# Patient Record
Sex: MALE | Race: WHITE | HISPANIC OR LATINO | Employment: OTHER | URBAN - NONMETROPOLITAN AREA
[De-identification: names, ages, dates, MRNs, and addresses within clinical notes are randomized per-mention and may not be internally consistent; named-entity substitution may affect disease eponyms.]

---

## 2017-04-07 ENCOUNTER — OFFICE VISIT (OUTPATIENT)
Dept: MEDICAL GROUP | Facility: PHYSICIAN GROUP | Age: 42
End: 2017-04-07
Payer: COMMERCIAL

## 2017-04-07 VITALS
HEIGHT: 72 IN | WEIGHT: 230 LBS | SYSTOLIC BLOOD PRESSURE: 136 MMHG | BODY MASS INDEX: 31.15 KG/M2 | DIASTOLIC BLOOD PRESSURE: 88 MMHG | HEART RATE: 60 BPM | RESPIRATION RATE: 16 BRPM | OXYGEN SATURATION: 97 % | TEMPERATURE: 97.9 F

## 2017-04-07 DIAGNOSIS — F51.01 PRIMARY INSOMNIA: ICD-10-CM

## 2017-04-07 DIAGNOSIS — K21.9 GASTROESOPHAGEAL REFLUX DISEASE, ESOPHAGITIS PRESENCE NOT SPECIFIED: ICD-10-CM

## 2017-04-07 DIAGNOSIS — E66.9 OBESITY (BMI 30-39.9): ICD-10-CM

## 2017-04-07 DIAGNOSIS — Z00.00 HEALTH CARE MAINTENANCE: ICD-10-CM

## 2017-04-07 DIAGNOSIS — E78.2 MIXED HYPERLIPIDEMIA: ICD-10-CM

## 2017-04-07 DIAGNOSIS — J31.0 OTHER RHINITIS: ICD-10-CM

## 2017-04-07 DIAGNOSIS — M25.512 CHRONIC LEFT SHOULDER PAIN: ICD-10-CM

## 2017-04-07 DIAGNOSIS — G89.29 CHRONIC LEFT SHOULDER PAIN: ICD-10-CM

## 2017-04-07 PROCEDURE — 99214 OFFICE O/P EST MOD 30 MIN: CPT | Performed by: NURSE PRACTITIONER

## 2017-04-07 RX ORDER — ESOMEPRAZOLE MAGNESIUM 40 MG/1
CAPSULE, DELAYED RELEASE ORAL
Refills: 2 | COMMUNITY
Start: 2017-02-26 | End: 2017-04-07 | Stop reason: SDUPTHER

## 2017-04-07 RX ORDER — AMITRIPTYLINE HYDROCHLORIDE 25 MG/1
25 TABLET, FILM COATED ORAL PRN
Qty: 90 TAB | Refills: 1 | Status: SHIPPED | OUTPATIENT
Start: 2017-04-07 | End: 2018-03-21 | Stop reason: SDUPTHER

## 2017-04-07 RX ORDER — ESOMEPRAZOLE MAGNESIUM 40 MG/1
CAPSULE, DELAYED RELEASE ORAL
Qty: 90 CAP | Refills: 3 | Status: SHIPPED | OUTPATIENT
Start: 2017-04-07 | End: 2018-08-23

## 2017-04-07 RX ORDER — FLUTICASONE PROPIONATE 50 MCG
1 SPRAY, SUSPENSION (ML) NASAL 2 TIMES DAILY
Qty: 1 BOTTLE | Refills: 11 | Status: SHIPPED | OUTPATIENT
Start: 2017-04-07 | End: 2018-08-23

## 2017-04-07 ASSESSMENT — PATIENT HEALTH QUESTIONNAIRE - PHQ9: CLINICAL INTERPRETATION OF PHQ2 SCORE: 0

## 2017-04-07 NOTE — PATIENT INSTRUCTIONS
Continue on esomeprazole--take on empty stomach    Work on diet and weight loss--aim for no more 75 grams of carbs per day    PT referral for chronic L shoulder pain    Medications refilled    Follow up with me in 6 months, with labs before visit      Food Choices for Gastroesophageal Reflux Disease, Adult  When you have gastroesophageal reflux disease (GERD), the foods you eat and your eating habits are very important. Choosing the right foods can help ease the discomfort of GERD.  WHAT GENERAL GUIDELINES DO I NEED TO FOLLOW?  · Choose fruits, vegetables, whole grains, low-fat dairy products, and low-fat meat, fish, and poultry.  · Limit fats such as oils, salad dressings, butter, nuts, and avocado.  · Keep a food diary to identify foods that cause symptoms.  · Avoid foods that cause reflux. These may be different for different people.  · Eat frequent small meals instead of three large meals each day.  · Eat your meals slowly, in a relaxed setting.  · Limit fried foods.  · Cook foods using methods other than frying.  · Avoid drinking alcohol.  · Avoid drinking large amounts of liquids with your meals.  · Avoid bending over or lying down until 2-3 hours after eating.  WHAT FOODS ARE NOT RECOMMENDED?  The following are some foods and drinks that may worsen your symptoms:  Vegetables  Tomatoes. Tomato juice. Tomato and spaghetti sauce. Chili peppers. Onion and garlic. Horseradish.  Fruits  Oranges, grapefruit, and lemon (fruit and juice).  Meats  High-fat meats, fish, and poultry. This includes hot dogs, ribs, ham, sausage, salami, and curiel.  Dairy  Whole milk and chocolate milk. Sour cream. Cream. Butter. Ice cream. Cream cheese.   Beverages  Coffee and tea, with or without caffeine. Carbonated beverages or energy drinks.  Condiments  Hot sauce. Barbecue sauce.   Sweets/Desserts  Chocolate and cocoa. Donuts. Peppermint and spearmint.  Fats and Oils  High-fat foods, including French fries and potato  chips.  Other  Vinegar. Strong spices, such as black pepper, white pepper, red pepper, cayenne, padgett powder, cloves, blanca, and chili powder.  The items listed above may not be a complete list of foods and beverages to avoid. Contact your dietitian for more information.     This information is not intended to replace advice given to you by your health care provider. Make sure you discuss any questions you have with your health care provider.     Document Released: 12/18/2006 Document Revised: 01/08/2016 Document Reviewed: 10/22/2014  [employer] Interactive Patient Education ©2016 [employer]

## 2017-04-07 NOTE — ASSESSMENT & PLAN NOTE
Patient has chronic left shoulder pain due to old injury. He has limited range of motion, also difficulty with positioning himself in bed, with pain to his left scapula. This is aggravated when he works out at the gym especially with overhead lat pulldown exercises. I discussed with him that perhaps some of these exercises may be aggravating the shoulder, will place referral to physical therapy for evaluation. If symptoms do not improve with physical therapy, over-the-counter NSAIDs, will get MRI and refer to orthopedics. Patient was agreeable to plan.

## 2017-04-07 NOTE — PROGRESS NOTES
Chief Complaint   Patient presents with   • Follow-Up     fv med         This is a 41 y.o.male patient that presents today with the following: Follow-up visit, acute and chronic conditions, medication refills    Chronic left shoulder pain  Patient has chronic left shoulder pain due to old injury. He has limited range of motion, also difficulty with positioning himself in bed, with pain to his left scapula. This is aggravated when he works out at the gym especially with overhead lat pulldown exercises. I discussed with him that perhaps some of these exercises may be aggravating the shoulder, will place referral to physical therapy for evaluation. If symptoms do not improve with physical therapy, over-the-counter NSAIDs, will get MRI and refer to orthopedics. Patient was agreeable to plan.    Gastroesophageal reflux disease  This is chronic in nature, stable and controlled with esomeprazole 40 mg daily. This was originally prescribed by his ENT provider. He is also trying to avoid aggravating factors. He was given printed educational material on foods and activities to avoid. He does need refills, this was called in for him. He does tolerate the medication well with no significant bothersome side effects. He was instructed to take this on an empty stomach, first thing in the morning. We will monitor this at least every 6 months and as needed.    Hyperlipidemia  This is chronic, stable and controlled with diet and exercise. His last lipid profile is as follows:  Component      Latest Ref Rng 10/19/2015           7:40 AM   Cholesterol,Tot      100 - 199 mg/dL 190   Triglycerides      0 - 149 mg/dL 87   HDL      >=40 mg/dL 53   LDL      <100 mg/dL 120 (H)   He is due for labs, these were ordered. He isn't have these done before he follows up with me in 6 months. In the interim, he is to continue with healthy diet, low-fat and low-cholesterol, increase physical activity and continued efforts towards weight  loss.    Obesity (BMI 30-39.9)  This is chronic, uncontrolled. His weight is 230 pounds, BMI 31.19. He understands the risks that obesity is imposing on his health. He tells me today he is going to start making significant changes to his diet, and he is going to increase his physical activity. He also understands that his obesity is affecting and likely aggravating his reflux. We discussed strategies on losing weight, decreasing his carb intake as well as calories. He is going to work on this for the next 6 months, we will reassess at that time.    Primary insomnia  This is a chronic condition, stable and controlled well on amitriptyline 25 mg at bedtime as needed. He tolerates this medication well with no significant or bothersome side effects. He does need refills, this was called in for him.  We did discuss sleep hygiene.  Discussed various topics of sleep hygiene: Encouraged to keep room dark and cool. Avoid caffeinated foods and/or beverages after noon. Avoid heavy meals and exercise 3-4 hours before bedtime. Avoid alcohol. Avoid electronic devices such as smart phones, tablets, TV, computers 3-4 hours prior to bedtime.   We will monitor this at least every 6 months and as needed.    Rhinitis  Chronic in nature, stable and well controlled on Flonase, one spray to each nostril twice a day. He does need refills, this was called in for him. He tolerates this well with no significant or bothersome side effects. We will monitor this at least annually and as needed.    Health care maintenance  Patient is due for routine fasting labs, these were ordered. He does not report a family history of colon cancer or polyps in a first-degree relative, thus he will not be due for colonoscopy until he is 50. He also does not report a family history of prostate cancer in a first-degree relative, thus he will not need a PSA screening. He is up-to-date with flu shot, he will need his next one in the fall.      No visits with  "results within 1 Month(s) from this visit.  Latest known visit with results is:    Office Visit on 11/22/2016   Component Date Value   • Rapid Strep Screen 11/22/2016 neg    • Internal Control Positive 11/22/2016 Positive    • Internal Control Negative 11/22/2016 Negative          clinical course has been stable    Past Medical History   Diagnosis Date   • A-fib (CMS-HCC)      4 years ago, one episode only, echo/stress test normal   • Geographic tongue    • Hypertension        History reviewed. No pertinent past surgical history.    Family History   Problem Relation Age of Onset   • Diabetes Mother        Lisinopril and Other drug    Current Outpatient Prescriptions Ordered in [facility]   Medication Sig Dispense Refill   • esomeprazole (NEXIUM) 40 MG delayed-release capsule TK 1 C C PO Q DAY 90 Cap 3   • amitriptyline (ELAVIL) 25 MG Tab Take 1 Tab by mouth as needed for Other. 90 Tab 1   • fluticasone (FLONASE) 50 MCG/ACT nasal spray Spray 1 Spray in nose 2 times a day. 1 Bottle 11     No current [facility]-ordered facility-administered medications on file.       Constitutional ROS: No unexpected change in weight, No weakness, No unexplained fevers, sweats, or chills  Pulmonary ROS: No chronic cough, sputum, or hemoptysis, No shortness of breath, No recent change in breathing, Positive for allergic rhinitis  Cardiovascular ROS: No chest pain, No edema, No palpitations  Gastrointestinal ROS: Positive for GERD, per history of present illness  Musculoskeletal/Extremities ROS: Positive for chronic left shoulder pain, per history of present illness  Neurologic ROS: Normal development, No seizures, No weakness  Psychiatric ROS: Positive for insomnia, per history of present illness    Physical exam:  /88 mmHg  Pulse 60  Temp(Src) 36.6 °C (97.9 °F)  Resp 16  Ht 1.829 m (6' 0.01\")  Wt 104.327 kg (230 lb)  BMI 31.19 kg/m2  SpO2 97%  General Appearance: Young male, alert, no distress, obese, well-groomed  Skin: Skin color, " texture, turgor normal. No rashes or lesions.  Lungs: negative findings: normal respiratory rate and rhythm, lungs clear to auscultation  Heart: negative. RRR without murmur, gallop, or rubs.  No ectopy.  Abdomen: Abdomen soft, non-tender. BS normal. No masses,  No organomegaly  Musculoskeletal: positive findings: Decreased range of motion of left shoulder due to pain  Neurologic: intact, oriented, mood appropriate, judgment intact. Cranial nerves II-12 grossly intact    Medical decision making/discussion: Patient here for follow-up visit and to discuss acute and chronic conditions. Medications were refilled, he is to take them as prescribed. He is due for labs, these were ordered. He is to have these done before he sees me in 6 months. I will place a referral to physical therapy to evaluate chronic left shoulder pain. In the interim he is to use over-the-counter NSAIDs as directed. If symptoms do not improve with the above interventions, will order MRI and place referral to orthopedics. He is to work on weight loss, increasing physical activity and making dietary changes as discussed. With regards to GERD, he was given printed educational material on foods and activity to avoid. He will be due for a flu shot in the fall.    Jensen was seen today for follow-up.    Diagnoses and all orders for this visit:    Gastroesophageal reflux disease, esophagitis presence not specified  -     esomeprazole (NEXIUM) 40 MG delayed-release capsule; TK 1 C C PO Q DAY    Chronic left shoulder pain  -     REFERRAL TO PHYSICAL THERAPY Reason for Therapy: Eval/Treat/Report    Primary insomnia  -     amitriptyline (ELAVIL) 25 MG Tab; Take 1 Tab by mouth as needed for Other.    Other rhinitis  -     fluticasone (FLONASE) 50 MCG/ACT nasal spray; Spray 1 Spray in nose 2 times a day.    Mixed hyperlipidemia  -     COMP METABOLIC PANEL; Future  -     LIPID PROFILE; Future    Obesity (BMI 30-39.9)  -     Patient identified as having weight  management issue.  Appropriate orders and counseling given.    Health care maintenance          Please note that this dictation was created using voice recognition software. I have made every reasonable attempt to correct obvious errors, but I expect that there are errors of grammar and possibly content that I did not discover before finalizing the note.

## 2017-04-07 NOTE — ASSESSMENT & PLAN NOTE
This is chronic, stable and controlled with diet and exercise. His last lipid profile is as follows:  Component      Latest Ref Rng 10/19/2015           7:40 AM   Cholesterol,Tot      100 - 199 mg/dL 190   Triglycerides      0 - 149 mg/dL 87   HDL      >=40 mg/dL 53   LDL      <100 mg/dL 120 (H)   He is due for labs, these were ordered. He isn't have these done before he follows up with me in 6 months. In the interim, he is to continue with healthy diet, low-fat and low-cholesterol, increase physical activity and continued efforts towards weight loss.

## 2017-04-07 NOTE — MR AVS SNAPSHOT
"Jensen Donald   2017 9:00 AM   Office Visit   MRN: [id number]    Department:  [facility]   Dept Phone:  [phone]    Description:  Male : 1975   Provider:  BRYAN Ma           Reason for Visit     Follow-Up fv med      Allergies as of 2017     Allergen Noted Reactions    Lisinopril 10/29/2015       cough    Other Drug 2013       Can't remember.  Few yrs ago. Anti-anxiety pill      You were diagnosed with     Chronic left shoulder pain   [118606]       Globus sensation   [591214]       Primary insomnia   [998663]       Other rhinitis   [5761218]       Eustachian tube dysfunction, bilateral   [5139272]       Gastroesophageal reflux disease, esophagitis presence not specified   [1838643]       Mixed hyperlipidemia   [272.2.ICD-9-CM]         Vital Signs     Blood Pressure Pulse Temperature Respirations Height Weight    136/88 mmHg 60 36.6 °C (97.9 °F) 16 1.829 m (6' 0.01\") 104.327 kg (230 lb)    Body Mass Index Oxygen Saturation Smoking Status             31.19 kg/m2 97% Former Smoker         Basic Information     Date Of Birth Sex Race Ethnicity Preferred Language    1975 Male  or   Origin (Stateless,Bur[place]ian,Malian,John, etc) English      Problem List              ICD-10-CM Priority Class Noted - Resolved    Ecchymosis R58   2013 - Present    Dizziness R42   2013 - Present    Nausea R11.0   2013 - Present    Purpura (CMS-HCC) D69.2   2013 - Present    Tonsil asymmetry J35.8   10/16/2014 - Present    Snoring R06.83   10/16/2014 - Present    Hyperlipidemia E78.5   10/16/2014 - Present    Cyst of joint of ankle or foot M25.879   10/16/2014 - Present    HTN (hypertension) I10   2014 - Present    abnormal electrocardiogram (ECG) (EKG) R94.31   2014 - Present    Pharyngitis due to Streptococcus species J02.0   10/21/2016 - Present    Globus sensation F45.8   10/21/2016 - Present    Primary " insomnia F51.01   10/21/2016 - Present    Recurrent pain of right knee M25.561   10/21/2016 - Present    Chronic left shoulder pain M25.512, G89.29   4/7/2017 - Present    Rhinitis J31.0   4/7/2017 - Present    Gastroesophageal reflux disease K21.9   4/7/2017 - Present      Health Maintenance        Date Due Completion Dates    IMM DTaP/Tdap/Td Vaccine (1 - Tdap) 7/26/1994 ---            Current Immunizations     Influenza TIV (IM) 1/10/2014  2:51 PM    Influenza Vaccine Quad Inj (Pf) 10/16/2014    Influenza Vaccine Quad Inj (Preserved) 10/21/2016      Below and/or attached are the medications your provider expects you to take. Review all of your home medications and newly ordered medications with your provider and/or pharmacist. Follow medication instructions as directed by your provider and/or pharmacist. Please keep your medication list with you and share with your provider. Update the information when medications are discontinued, doses are changed, or new medications (including over-the-counter products) are added; and carry medication information at all times in the event of emergency situations     Allergies:  LISINOPRIL - (reactions not documented)     OTHER DRUG - (reactions not documented)               Medications  Valid as of: April 07, 2017 -  9:27 AM    Generic Name Brand Name Tablet Size Instructions for use    Amitriptyline HCl (Tab) ELAVIL 25 MG Take 1 Tab by mouth as needed for Other.        Esomeprazole Magnesium (CAPSULE DELAYED RELEASE) NEXIUM 40 MG TK 1 C C PO Q DAY        Fluticasone Propionate (Suspension) FLONASE 50 MCG/ACT Spray 1 Spray in nose 2 times a day.        .                 Medicines prescribed today were sent to:     [id number] - [place], [place] - [address]    [address] [place] [address]    Phone: [phone] Fax: [phone]    Open 24 Hours?: No      Medication refill instructions:       If your  prescription bottle indicates you have medication refills left, it is not necessary to call your provider’s office. Please contact your pharmacy and they will refill your medication.    If your prescription bottle indicates you do not have any refills left, you may request refills at any time through one of the following ways: The online [email or web address] system (except Urgent Care), by calling your provider’s office, or by asking your pharmacy to contact your provider’s office with a refill request. Medication refills are processed only during regular business hours and may not be available until the next business day. Your provider may request additional information or to have a follow-up visit with you prior to refilling your medication.   *Please Note: Medication refills are assigned a new Rx number when refilled electronically. Your pharmacy may indicate that no refills were authorized even though a new prescription for the same medication is available at the pharmacy. Please request the medicine by name with the pharmacy before contacting your provider for a refill.        Your To Do List     Future Labs/Procedures Complete By Expires    COMP METABOLIC PANEL  As directed 4/7/2018    LIPID PROFILE  As directed 4/7/2018      Referral     A referral request has been sent to our patient care coordination department. Please allow 3-5 business days for us to process this request and contact you either by phone or mail. If you do not hear from us by the 5th business day, please call us at [phone].        Instructions    Continue on esomeprazole--take on empty stomach    Work on diet and weight loss--aim for no more 75 grams of carbs per day    PT referral for chronic L shoulder pain    Medications refilled    Follow up with me in 6 months, with labs before visit      Food Choices for Gastroesophageal Reflux Disease, Adult  When you have gastroesophageal reflux disease (GERD), the foods you eat and your eating habits are  very important. Choosing the right foods can help ease the discomfort of GERD.  WHAT GENERAL GUIDELINES DO I NEED TO FOLLOW?  · Choose fruits, vegetables, whole grains, low-fat dairy products, and low-fat meat, fish, and poultry.  · Limit fats such as oils, salad dressings, butter, nuts, and avocado.  · Keep a food diary to identify foods that cause symptoms.  · Avoid foods that cause reflux. These may be different for different people.  · Eat frequent small meals instead of three large meals each day.  · Eat your meals slowly, in a relaxed setting.  · Limit fried foods.  · Cook foods using methods other than frying.  · Avoid drinking alcohol.  · Avoid drinking large amounts of liquids with your meals.  · Avoid bending over or lying down until 2-3 hours after eating.  WHAT FOODS ARE NOT RECOMMENDED?  The following are some foods and drinks that may worsen your symptoms:  Vegetables  Tomatoes. Tomato juice. Tomato and spaghetti sauce. Chili peppers. Onion and garlic. Horseradish.  Fruits  Oranges, grapefruit, and lemon (fruit and juice).  Meats  High-fat meats, fish, and poultry. This includes hot dogs, ribs, ham, sausage, salami, and curiel.  Dairy  Whole milk and chocolate milk. Sour cream. Cream. Butter. Ice cream. Cream cheese.   Beverages  Coffee and tea, with or without caffeine. Carbonated beverages or energy drinks.  Condiments  Hot sauce. Barbecue sauce.   Sweets/Desserts  Chocolate and cocoa. Donuts. Peppermint and spearmint.  Fats and Oils  High-fat foods, including French fries and potato chips.  Other  Vinegar. Strong spices, such as black pepper, white pepper, red pepper, cayenne, padgett powder, cloves, ginger, and chili powder.  The items listed above may not be a complete list of foods and beverages to avoid. Contact your dietitian for more information.     This information is not intended to replace advice given to you by your health care provider. Make sure you discuss any questions you have with your  health care provider.     Document Released: 12/18/2006 Document Revised: 01/08/2016 Document Reviewed: 10/22/2014  [employer] Interactive Patient Education ©2016 [employer]            [employer] Access Code: Activation code not generated  Current [place] [facility] Status: Active

## 2017-04-07 NOTE — ASSESSMENT & PLAN NOTE
This is a chronic condition, stable and controlled well on amitriptyline 25 mg at bedtime as needed. He tolerates this medication well with no significant or bothersome side effects. He does need refills, this was called in for him.  We did discuss sleep hygiene.  Discussed various topics of sleep hygiene: Encouraged to keep room dark and cool. Avoid caffeinated foods and/or beverages after noon. Avoid heavy meals and exercise 3-4 hours before bedtime. Avoid alcohol. Avoid electronic devices such as smart phones, tablets, TV, computers 3-4 hours prior to bedtime.   We will monitor this at least every 6 months and as needed.

## 2017-04-07 NOTE — ASSESSMENT & PLAN NOTE
This is chronic, uncontrolled. His weight is 230 pounds, BMI 31.19. He understands the risks that obesity is imposing on his health. He tells me today he is going to start making significant changes to his diet, and he is going to increase his physical activity. He also understands that his obesity is affecting and likely aggravating his reflux. We discussed strategies on losing weight, decreasing his carb intake as well as calories. He is going to work on this for the next 6 months, we will reassess at that time.

## 2017-04-07 NOTE — ASSESSMENT & PLAN NOTE
Chronic in nature, stable and well controlled on Flonase, one spray to each nostril twice a day. He does need refills, this was called in for him. He tolerates this well with no significant or bothersome side effects. We will monitor this at least annually and as needed.

## 2017-04-07 NOTE — ASSESSMENT & PLAN NOTE
Patient is due for routine fasting labs, these were ordered. He does not report a family history of colon cancer or polyps in a first-degree relative, thus he will not be due for colonoscopy until he is 50. He also does not report a family history of prostate cancer in a first-degree relative, thus he will not need a PSA screening. He is up-to-date with flu shot, he will need his next one in the fall.

## 2017-04-07 NOTE — ASSESSMENT & PLAN NOTE
This is chronic in nature, stable and controlled with esomeprazole 40 mg daily. This was originally prescribed by his ENT provider. He is also trying to avoid aggravating factors. He was given printed educational material on foods and activities to avoid. He does need refills, this was called in for him. He does tolerate the medication well with no significant bothersome side effects. He was instructed to take this on an empty stomach, first thing in the morning. We will monitor this at least every 6 months and as needed.

## 2017-11-07 ENCOUNTER — HOSPITAL ENCOUNTER (OUTPATIENT)
Dept: LAB | Facility: MEDICAL CENTER | Age: 42
End: 2017-11-07
Attending: NURSE PRACTITIONER
Payer: COMMERCIAL

## 2017-11-07 DIAGNOSIS — E78.2 MIXED HYPERLIPIDEMIA: ICD-10-CM

## 2017-11-07 LAB
ALBUMIN SERPL BCP-MCNC: 4.1 G/DL (ref 3.2–4.9)
ALBUMIN/GLOB SERPL: 1.5 G/DL
ALP SERPL-CCNC: 74 U/L (ref 30–99)
ALT SERPL-CCNC: 40 U/L (ref 2–50)
ANION GAP SERPL CALC-SCNC: 7 MMOL/L (ref 0–11.9)
AST SERPL-CCNC: 21 U/L (ref 12–45)
BILIRUB SERPL-MCNC: 0.5 MG/DL (ref 0.1–1.5)
BUN SERPL-MCNC: 25 MG/DL (ref 8–22)
CALCIUM SERPL-MCNC: 9.2 MG/DL (ref 8.5–10.5)
CHLORIDE SERPL-SCNC: 104 MMOL/L (ref 96–112)
CHOLEST SERPL-MCNC: 191 MG/DL (ref 100–199)
CO2 SERPL-SCNC: 25 MMOL/L (ref 20–33)
CREAT SERPL-MCNC: 0.89 MG/DL (ref 0.5–1.4)
GFR SERPL CREATININE-BSD FRML MDRD: >60 ML/MIN/1.73 M 2
GLOBULIN SER CALC-MCNC: 2.7 G/DL (ref 1.9–3.5)
GLUCOSE SERPL-MCNC: 85 MG/DL (ref 65–99)
HDLC SERPL-MCNC: 48 MG/DL
LDLC SERPL CALC-MCNC: 122 MG/DL
POTASSIUM SERPL-SCNC: 4 MMOL/L (ref 3.6–5.5)
PROT SERPL-MCNC: 6.8 G/DL (ref 6–8.2)
SODIUM SERPL-SCNC: 136 MMOL/L (ref 135–145)
TRIGL SERPL-MCNC: 106 MG/DL (ref 0–149)

## 2017-11-07 PROCEDURE — 80061 LIPID PANEL: CPT

## 2017-11-07 PROCEDURE — 36415 COLL VENOUS BLD VENIPUNCTURE: CPT

## 2017-11-07 PROCEDURE — 80053 COMPREHEN METABOLIC PANEL: CPT

## 2017-11-08 ENCOUNTER — APPOINTMENT (OUTPATIENT)
Dept: RADIOLOGY | Facility: IMAGING CENTER | Age: 42
End: 2017-11-08
Attending: NURSE PRACTITIONER
Payer: COMMERCIAL

## 2017-11-08 ENCOUNTER — TELEPHONE (OUTPATIENT)
Dept: MEDICAL GROUP | Facility: PHYSICIAN GROUP | Age: 42
End: 2017-11-08

## 2017-11-08 ENCOUNTER — OFFICE VISIT (OUTPATIENT)
Dept: MEDICAL GROUP | Facility: PHYSICIAN GROUP | Age: 42
End: 2017-11-08
Payer: COMMERCIAL

## 2017-11-08 VITALS
SYSTOLIC BLOOD PRESSURE: 126 MMHG | HEART RATE: 88 BPM | RESPIRATION RATE: 16 BRPM | OXYGEN SATURATION: 97 % | TEMPERATURE: 98.1 F | DIASTOLIC BLOOD PRESSURE: 98 MMHG | HEIGHT: 72 IN | BODY MASS INDEX: 33.05 KG/M2 | WEIGHT: 244 LBS

## 2017-11-08 DIAGNOSIS — M25.532 LEFT WRIST PAIN: ICD-10-CM

## 2017-11-08 DIAGNOSIS — J30.9 CHRONIC ALLERGIC RHINITIS, UNSPECIFIED SEASONALITY, UNSPECIFIED TRIGGER: ICD-10-CM

## 2017-11-08 DIAGNOSIS — M25.512 CHRONIC LEFT SHOULDER PAIN: ICD-10-CM

## 2017-11-08 DIAGNOSIS — G89.29 CHRONIC LEFT SHOULDER PAIN: ICD-10-CM

## 2017-11-08 DIAGNOSIS — E66.9 OBESITY (BMI 30-39.9): ICD-10-CM

## 2017-11-08 DIAGNOSIS — E78.5 MILD HYPERLIPIDEMIA: ICD-10-CM

## 2017-11-08 PROCEDURE — 99214 OFFICE O/P EST MOD 30 MIN: CPT | Performed by: NURSE PRACTITIONER

## 2017-11-08 PROCEDURE — 73110 X-RAY EXAM OF WRIST: CPT | Mod: TC,LT | Performed by: NURSE PRACTITIONER

## 2017-11-08 NOTE — ASSESSMENT & PLAN NOTE
This is a chronic condition, uncontrolled. His weight is 244 pounds, BMI is 33.09. He has gained nearly 15 pounds since his last visit in April 2017. He states he has been increasingly busy with his job and working has been doing around the house. He has resumed going to the gym and starting to work on decreasing calories as well as carbohydrates.

## 2017-11-08 NOTE — ASSESSMENT & PLAN NOTE
This is a chronic condition, stable. He has chronic pain in the left shoulder due to an old injury. He does continue to have limited range of motion. At his last visit with me in early April 2017 he was referred to physical therapy, but he admits to never going to therapy. I did notify him that referral is still good and it will be good to April 2018. He states that he will give them a call and set up an appointment.

## 2017-11-08 NOTE — ASSESSMENT & PLAN NOTE
This is chronic, for years now. He thinks he may have fractured it several years ago. The other day he re-injured it by lifting a heavy weight in a neutral position. He did hear a pop. He does have pain that radiates up into forearm and shoulder. He reports that at times the pain is likened to numbness and tingling. He does have negative Phalen's and Tinel's test in office today. He does have tenderness over the thenar prominence. He has been using a old wrist splint with additional thumb support but it does not keep his wrist from flexion. We will get x-ray today, results are as follows:    1.  Small curvilinear ossific density adjacent to the radial aspect of the left ulnar styloid which represent small avulsion fracture fragment.    2.  Otherwise negative left wrist series.    He was advised to wear a cockup wrist splint, was encouraged to continue with over-the-counter ibuprofen or Aleve. If symptoms do not improve with splinting and analgesics, patient may need to repeat imaging and possibly referral to hand surgeon.

## 2017-11-08 NOTE — PROGRESS NOTES
Chief Complaint   Patient presents with   • Hypertension     fv labs   • Wrist Pain     L wrist pain         This is a 42 y.o.male patient that presents today with the following:Follow-up visit, review labs, discuss acute and chronic conditions    Left wrist pain  This is chronic, for years now. He thinks he may have fractured it several years ago. The other day he re-injured it by lifting a heavy weight in a neutral position. He did hear a pop. He does have pain that radiates up into forearm and shoulder. He reports that at times the pain is likened to numbness and tingling. He does have negative Phalen's and Tinel's test in office today. He does have tenderness over the thenar prominence. He has been using a old wrist splint with additional thumb support but it does not keep his wrist from flexion. We will get x-ray today, results are as follows:    1.  Small curvilinear ossific density adjacent to the radial aspect of the left ulnar styloid which represent small avulsion fracture fragment.    2.  Otherwise negative left wrist series.    He was advised to wear a cockup wrist splint, was encouraged to continue with over-the-counter ibuprofen or Aleve. If symptoms do not improve with splinting and analgesics, patient may need to repeat imaging and possibly referral to hand surgeon.    Rhinitis  This is a chronic condition, stable and well controlled with daily Flonase. He tolerates medication well no significant bothersome side effects. He does not need refills at this time.    Obesity (BMI 30-39.9)  This is a chronic condition, uncontrolled. His weight is 244 pounds, BMI is 33.09. He has gained nearly 15 pounds since his last visit in April 2017. He states he has been increasingly busy with his job and working has been doing around the house. He has resumed going to the gym and starting to work on decreasing calories as well as carbohydrates.    Mild hyperlipidemia  This is a chronic condition, stable and well  controlled with diet and exercise. His lipid profile is as follows:  Component      Latest Ref Rng & Units 11/7/2017           9:03 AM   Cholesterol,Tot      100 - 199 mg/dL 191   Triglycerides      0 - 149 mg/dL 106   HDL      >=40 mg/dL 48   LDL      <100 mg/dL 122 (H)   He does not take statins or any other cholesterol lowering medications. He was encouraged to continue with healthy low-fat, low-cholesterol diet, regular physical activity and continued efforts towards weight loss. We will recheck labs in one year.    Chronic left shoulder pain  This is a chronic condition, stable. He has chronic pain in the left shoulder due to an old injury. He does continue to have limited range of motion. At his last visit with me in early April 2017 he was referred to physical therapy, but he admits to never going to therapy. I did notify him that referral is still good and it will be good to April 2018. He states that he will give them a call and set up an appointment.      Hospital Outpatient Visit on 11/07/2017   Component Date Value   • Sodium 11/07/2017 136    • Potassium 11/07/2017 4.0    • Chloride 11/07/2017 104    • Co2 11/07/2017 25    • Anion Gap 11/07/2017 7.0    • Glucose 11/07/2017 85    • Bun 11/07/2017 25*   • Creatinine 11/07/2017 0.89    • Calcium 11/07/2017 9.2    • AST(SGOT) 11/07/2017 21    • ALT(SGPT) 11/07/2017 40    • Alkaline Phosphatase 11/07/2017 74    • Total Bilirubin 11/07/2017 0.5    • Albumin 11/07/2017 4.1    • Total Protein 11/07/2017 6.8    • Globulin 11/07/2017 2.7    • A-G Ratio 11/07/2017 1.5    • Cholesterol,Tot 11/07/2017 191    • Triglycerides 11/07/2017 106    • HDL 11/07/2017 48    • LDL 11/07/2017 122*   • GFR If  11/07/2017 >60    • GFR If Non  Ameri* 11/07/2017 >60          clinical course has been stable    Past Medical History:   Diagnosis Date   • A-fib (CMS-HCC)     4 years ago, one episode only, echo/stress test normal   • Geographic tongue    •  Hypertension        No past surgical history on file.    Family History   Problem Relation Age of Onset   • Diabetes Mother        Diltiazem hcl; Lisinopril; and Other drug    Current Outpatient Prescriptions Ordered in [facility]   Medication Sig Dispense Refill   • esomeprazole (NEXIUM) 40 MG delayed-release capsule TK 1 C C PO Q DAY 90 Cap 3   • amitriptyline (ELAVIL) 25 MG Tab Take 1 Tab by mouth as needed for Other. 90 Tab 1   • fluticasone (FLONASE) 50 MCG/ACT nasal spray Spray 1 Spray in nose 2 times a day. 1 Bottle 11     No current [facility]-ordered facility-administered medications on file.        Constitutional ROS: No unexpected change in weight, No weakness, No unexplained fevers, sweats, or chills  Pulmonary ROS: No chronic cough, sputum, or hemoptysis, No shortness of breath, No recent change in breathing  Cardiovascular ROS: No chest pain, No edema, No palpitations  Gastrointestinal ROS: No abdominal pain, No nausea, vomiting, diarrhea, or constipation, no blood in stool  Musculoskeletal/Extremities ROS: Positive per history of present illness  Neurologic ROS: Normal development, No seizures, No weakness    Physical exam:  /98   Pulse 88   Temp 36.7 °C (98.1 °F)   Resp 16   Ht 1.829 m (6')   Wt 110.7 kg (244 lb)   SpO2 97%   BMI 33.09 kg/m²   General Appearance: Young male, alert, no distress, obese, well-groomed  Skin: Skin color, texture, turgor normal. No rashes or lesions.  Lungs: negative findings: normal respiratory rate and rhythm, normal effort  Heart: negative. RRR without murmur, gallop, or rubs.  No ectopy.  Abdomen: Abdomen soft, non-tender. BS normal. No masses,  No organomegaly  Musculoskeletal: positive findings: Tenderness with palpation to left thenar prominence and limited range of motion to left wrist.  Neurologic: intact, oriented, mood appropriate, judgment intact. Cranial nerves II-12 grossly intact    Medical decision making/discussion: Patient to continue wearing cock up  wrist splint on left wrist and take over-the-counter analgesics. Discussed possibility of referral for physical therapy as well as hand surgeon if symptoms do not continue to improve over the next couple weeks. He is continuing all his other medications as prescribed and call for refills as needed. He is going to work on his weight, increasing physical activity and decreasing calories as well as carbohydrates. He will be due for labs again in one year. He declines flu shot despite discussed benefits versus risks of not having done.    Jensen was seen today for hypertension and wrist pain.    Diagnoses and all orders for this visit:    Left wrist pain  -     DX-WRIST-COMPLETE 3+ LEFT; Future    Chronic left shoulder pain    Chronic allergic rhinitis, unspecified seasonality, unspecified trigger    Mild hyperlipidemia    Obesity (BMI 30-39.9)  -     Patient identified as having weight management issue.  Appropriate orders and counseling given.          Please note that this dictation was created using voice recognition software. I have made every reasonable attempt to correct obvious errors, but I expect that there are errors of grammar and possibly content that I did not discover before finalizing the note.

## 2017-11-08 NOTE — ASSESSMENT & PLAN NOTE
This is a chronic condition, stable and well controlled with daily Flonase. He tolerates medication well no significant bothersome side effects. He does not need refills at this time.

## 2017-11-15 ENCOUNTER — TELEPHONE (OUTPATIENT)
Dept: MEDICAL GROUP | Facility: PHYSICIAN GROUP | Age: 42
End: 2017-11-15

## 2017-11-15 NOTE — TELEPHONE ENCOUNTER
----- Message from Jensen Donald sent at 11/15/2017  5:00 AM PST -----  Regarding: x-ray results  Jensen  I have reviewed the results of your x-ray, it does appear that there may have been a fracture in the past, however there is no evidence of any acute (new) fracture or dislocation. If your pain does not continue to improve or if it worsens in the next week or 2, we may need to re-image your wrist and refer you to hand surgeon.  Grace

## 2017-11-15 NOTE — TELEPHONE ENCOUNTER
I sent the below info to patient via a [employer] message a week ago--pt still has not read message. Please call pt with message. Thank you.

## 2018-03-21 ENCOUNTER — OFFICE VISIT (OUTPATIENT)
Dept: URGENT CARE | Facility: PHYSICIAN GROUP | Age: 43
End: 2018-03-21
Payer: COMMERCIAL

## 2018-03-21 VITALS
HEART RATE: 88 BPM | TEMPERATURE: 100.7 F | SYSTOLIC BLOOD PRESSURE: 144 MMHG | HEIGHT: 72 IN | RESPIRATION RATE: 20 BRPM | BODY MASS INDEX: 33.86 KG/M2 | DIASTOLIC BLOOD PRESSURE: 86 MMHG | WEIGHT: 250 LBS | OXYGEN SATURATION: 97 %

## 2018-03-21 DIAGNOSIS — F51.01 PRIMARY INSOMNIA: ICD-10-CM

## 2018-03-21 DIAGNOSIS — J06.9 URI WITH COUGH AND CONGESTION: ICD-10-CM

## 2018-03-21 PROCEDURE — 99214 OFFICE O/P EST MOD 30 MIN: CPT | Performed by: PHYSICIAN ASSISTANT

## 2018-03-21 RX ORDER — DOXYCYCLINE HYCLATE 100 MG
100 TABLET ORAL 2 TIMES DAILY
Qty: 20 TAB | Refills: 0 | Status: SHIPPED | OUTPATIENT
Start: 2018-03-21 | End: 2018-06-17

## 2018-03-21 RX ORDER — AMITRIPTYLINE HYDROCHLORIDE 25 MG/1
25 TABLET, FILM COATED ORAL PRN
Qty: 90 TAB | Refills: 0 | Status: SHIPPED | OUTPATIENT
Start: 2018-03-21 | End: 2019-02-21

## 2018-03-21 NOTE — LETTER
March 21, 2018         Patient: Jensen Donald   YOB: 1975   Date of Visit: 3/21/2018           To Whom it May Concern:    Jensen Donald was seen in my clinic on 3/21/2018. He may return to work on 3/23/18.    If you have any questions or concerns, please don't hesitate to call.        Sincerely,           Karla Butt P.A.-C.  Electronically Signed

## 2018-03-21 NOTE — PROGRESS NOTES
Chief Complaint   Patient presents with   • Earache     Chest congestion; x4 days       HISTORY OF PRESENT ILLNESS: Patient is a 42 y.o. male who presents today for the following:    Sinus congestion x 7 days  + right ear pain, cough, SOB, body aches, fever (just started last night)  OTC meds tried: none   Patient also states he is out of his amitriptyline that he uses for insomnia as needed    Patient Active Problem List    Diagnosis Date Noted   • Left wrist pain 11/08/2017   • Chronic left shoulder pain 04/07/2017   • Rhinitis 04/07/2017   • Gastroesophageal reflux disease 04/07/2017   • Obesity (BMI 30-39.9) 04/07/2017   • Health care maintenance 04/07/2017   • Pharyngitis due to Streptococcus species 10/21/2016   • Globus sensation 10/21/2016   • Primary insomnia 10/21/2016   • Recurrent pain of right knee 10/21/2016   • HTN (hypertension) 12/19/2014   • abnormal electrocardiogram (ECG) (EKG) 12/19/2014   • Tonsil asymmetry 10/16/2014   • Snoring 10/16/2014   • Mild hyperlipidemia 10/16/2014   • Cyst of joint of ankle or foot 10/16/2014   • Ecchymosis 11/13/2013   • Dizziness 11/13/2013   • Nausea 11/13/2013   • Purpura (CMS-HCC) 11/13/2013       Allergies:Diltiazem hcl; Lisinopril; and Other drug    Current Outpatient Prescriptions Ordered in [facility]   Medication Sig Dispense Refill   • doxycycline (VIBRAMYCIN) 100 MG Tab Take 1 Tab by mouth 2 times a day. 20 Tab 0   • amitriptyline (ELAVIL) 25 MG Tab Take 1 Tab by mouth as needed for Other. 90 Tab 0   • esomeprazole (NEXIUM) 40 MG delayed-release capsule TK 1 C C PO Q DAY 90 Cap 3   • fluticasone (FLONASE) 50 MCG/ACT nasal spray Spray 1 Spray in nose 2 times a day. 1 Bottle 11     No current [facility]-ordered facility-administered medications on file.        Past Medical History:   Diagnosis Date   • A-fib (CMS-HCC)     4 years ago, one episode only, echo/stress test normal   • Geographic tongue    • Hypertension        Social History   Substance Use Topics   •  Smoking status: Former Smoker     Packs/day: 0.25     Years: 1.00     Types: Cigarettes   • Smokeless tobacco: Never Used   • Alcohol use 1.8 oz/week     1 Glasses of wine, 1 Cans of beer, 1 Shots of liquor per week      Comment: rarely       Family Status   Relation Status   • Mother      Family History   Problem Relation Age of Onset   • Diabetes Mother        ROS:    Review of Systems   Constitutional: Negative for fever, chills, weight loss and malaise/fatigue.   HENT: Negative for ear pain, nosebleeds,  sore throat and neck pain.    Eyes: Negative for blurred vision.   Respiratory: Negative for sputum production  and wheezing.    Cardiovascular: Negative for chest pain, palpitations, orthopnea and leg swelling.   Gastrointestinal: Negative for heartburn, nausea, vomiting and abdominal pain.   Genitourinary: Negative for dysuria, urgency and frequency.       Exam:  Blood pressure 144/86, pulse 88, temperature (!) 38.2 °C (100.7 °F), resp. rate 20, height 1.829 m (6'), weight 113.4 kg (250 lb), SpO2 97 %.  General: Well developed, well nourished. No distress.  HEENT: Conjunctiva clear, lids without ptosis, PERRL/EOMI. Ears normal shape and contour, canals are clear bilaterally, tympanic membranes are benign. Nasal mucosa benign. Oropharynx is without erythema, edema or exudates. MMM.  Pulmonary: Clear to ausculation and percussion.  Normal effort. No rales, ronchi, or wheezing.   Cardiovascular: Regular rate and rhythm without murmur. No edema.   Neurologic: Grossly nonfocal.  Lymph: No cervical lymphadenopathy noted.  Skin: Warm, dry, good turgor. No rashes in visible areas.   Psych: Normal mood. Alert and oriented x3. Judgment and insight is normal.    Assessment/Plan:  Take all medication as directed. Discussed appropriate over-the-counter symptomatic medication, and when to return to clinic. Follow-up for worsening or persistent symptoms.   1. URI with cough and congestion  doxycycline (VIBRAMYCIN)  100 MG Tab   2. Primary insomnia  amitriptyline (ELAVIL) 25 MG Tab

## 2018-03-26 DIAGNOSIS — R06.02 SOB (SHORTNESS OF BREATH): ICD-10-CM

## 2018-03-26 RX ORDER — METHYLPREDNISOLONE 4 MG/1
TABLET ORAL
Qty: 21 TAB | Refills: 0 | Status: SHIPPED | OUTPATIENT
Start: 2018-03-26 | End: 2018-06-17

## 2018-06-17 ENCOUNTER — OFFICE VISIT (OUTPATIENT)
Dept: URGENT CARE | Facility: PHYSICIAN GROUP | Age: 43
End: 2018-06-17
Payer: COMMERCIAL

## 2018-06-17 VITALS
WEIGHT: 255 LBS | DIASTOLIC BLOOD PRESSURE: 94 MMHG | RESPIRATION RATE: 12 BRPM | BODY MASS INDEX: 34.54 KG/M2 | HEIGHT: 72 IN | OXYGEN SATURATION: 96 % | SYSTOLIC BLOOD PRESSURE: 150 MMHG | TEMPERATURE: 98.6 F | HEART RATE: 64 BPM

## 2018-06-17 DIAGNOSIS — J22 ACUTE RESPIRATORY INFECTION: ICD-10-CM

## 2018-06-17 DIAGNOSIS — J45.20 MILD INTERMITTENT ASTHMA, UNSPECIFIED WHETHER COMPLICATED: ICD-10-CM

## 2018-06-17 PROCEDURE — 99214 OFFICE O/P EST MOD 30 MIN: CPT | Performed by: FAMILY MEDICINE

## 2018-06-17 RX ORDER — ALBUTEROL SULFATE 90 UG/1
AEROSOL, METERED RESPIRATORY (INHALATION)
Qty: 1 INHALER | Refills: 2 | Status: SHIPPED | OUTPATIENT
Start: 2018-06-17 | End: 2018-08-23

## 2018-06-17 RX ORDER — AZITHROMYCIN 250 MG/1
TABLET, FILM COATED ORAL
Qty: 6 TAB | Refills: 0 | Status: SHIPPED | OUTPATIENT
Start: 2018-06-17 | End: 2018-08-23

## 2018-06-17 NOTE — PROGRESS NOTES
Chief Complaint:    Chief Complaint   Patient presents with   • Pharyngitis     Recuring sore throat and Cold Sx       History of Present Illness:    This is a new problem. 2-3 weeks ago, he had sore throat which resolved. However in past 1 week, he has had subjective fever, nasal symptoms with purulent mucus from nose, sore throat, and cough productive of purulent mucus. Not getting better. Had asthma as child and feels like asthma is flaring. Used other person's MDI with some help. Did not like Doxycycline or Medrol Dose Tad. Z-tad works/tolerates.      Review of Systems:    Constitutional: See HPI.  Eyes: Negative for change in vision, photophobia, pain, redness, and discharge.  ENT: See HPI.    Respiratory: See HPI.  Cardiovascular: Negative for chest pain, palpitations, orthopnea, claudication, leg swelling, and PND.   Gastrointestinal: Negative for abdominal pain, nausea, vomiting, diarrhea, constipation, blood in stool, and melena.   Genitourinary: Negative for dysuria, urinary urgency, urinary frequency, hematuria, and flank pain.   Musculoskeletal: Negative for myalgias, joint pain, neck pain, and back pain.   Skin: Negative for rash and itching.   Neurological: Negative for dizziness, tingling, tremors, sensory change, speech change, focal weakness, seizures, loss of consciousness, and headaches.   Endo: Negative for polydipsia.   Heme: Does not bruise/bleed easily.   Psychiatric/Behavioral: Negative for depression, suicidal ideas, hallucinations, memory loss and substance abuse.         Past Medical History:    Past Medical History:   Diagnosis Date   • A-fib (HCC)     4 years ago, one episode only, echo/stress test normal   • Geographic tongue    • Hypertension      Past Surgical History:    History reviewed. No pertinent surgical history.    Social History:    Social History     Social History   • Marital status:      Spouse name: N/A   • Number of children: N/A   • Years of education: N/A      Occupational History   • Not on file.     Social History Main Topics   • Smoking status: Former Smoker     Packs/day: 0.25     Years: 1.00     Types: Cigarettes   • Smokeless tobacco: Never Used   • Alcohol use 1.8 oz/week     1 Glasses of wine, 1 Cans of beer, 1 Shots of liquor per week      Comment: rarely   • Drug use: No   • Sexual activity: Yes     Partners: Female      Comment: wife, Fern     Other Topics Concern   • Not on file     Social History Narrative   • No narrative on file     Family History:    Family History   Problem Relation Age of Onset   • Diabetes Mother      Medications:    Current Outpatient Prescriptions on File Prior to Visit   Medication Sig Dispense Refill   • amitriptyline (ELAVIL) 25 MG Tab Take 1 Tab by mouth as needed for Other. 90 Tab 0   • esomeprazole (NEXIUM) 40 MG delayed-release capsule TK 1 C C PO Q DAY 90 Cap 3   • fluticasone (FLONASE) 50 MCG/ACT nasal spray Spray 1 Spray in nose 2 times a day. 1 Bottle 11     No current facility-administered medications on file prior to visit.      Allergies:    Allergies   Allergen Reactions   • Diltiazem Hcl      Other reaction(s): Headache   • Lisinopril      cough   • Other Drug      Can't remember.  Few yrs ago. Anti-anxiety pill       Vitals:    Vitals:    06/17/18 1235   BP: 150/94   Pulse: 64   Resp: 12   Temp: 37 °C (98.6 °F)   SpO2: 96%   Weight: 115.7 kg (255 lb)   Height: 1.829 m (6')       Physical Exam:    Constitutional: Vital signs reviewed. Appears well-developed and well-nourished. Occl cough. No acute distress.   Eyes: Sclera white, conjunctivae clear.   ENT: External ears normal. External auditory canals normal without discharge. TMs translucent and non-bulging. Hearing normal. Nasal mucosa erythematous. Lips/teeth are normal. Oral mucosa pink and moist. Posterior pharynx: WNL.  Neck: Neck supple.   Cardiovascular: Regular rate and rhythm. No murmur.  Pulmonary/Chest: Respirations non-labored. Clear to auscultation  bilaterally.  Lymph: Cervical nodes without tenderness or enlargement.  Musculoskeletal: Normal gait. Normal range of motion. No muscular atrophy or weakness.  Neurological: Alert and oriented to person, place, and time. Muscle tone normal. Coordination normal.   Skin: No rashes or lesions. Warm, dry, normal turgor.  Psychiatric: Normal mood and affect. Behavior is normal. Judgment and thought content normal.       Assessment / Plan:    1. Acute respiratory infection  - azithromycin (ZITHROMAX) 250 MG Tab; 2 TABS ON DAY 1, 1 TAB ON DAYS 2-5.  Dispense: 6 Tab; Refill: 0  - albuterol 108 (90 Base) MCG/ACT Aero Soln inhalation aerosol; 2 PUFFS EVERY 4 HOURS ONLY IF NEEDED FOR COUGH, WHEEZING, OR SHORTNESS OF BREATH.  Dispense: 1 Inhaler; Refill: 2    2. Mild intermittent asthma, unspecified whether complicated  - albuterol 108 (90 Base) MCG/ACT Aero Soln inhalation aerosol; 2 PUFFS EVERY 4 HOURS ONLY IF NEEDED FOR COUGH, WHEEZING, OR SHORTNESS OF BREATH.  Dispense: 1 Inhaler; Refill: 2      Discussed with him DDX and management options.    May use OTC meds for symptoms prn.    Agreeable to medications prescribed.    Follow-up with PCP or urgent care if getting worse or not better with above.

## 2018-07-29 ENCOUNTER — APPOINTMENT (OUTPATIENT)
Dept: RADIOLOGY | Facility: IMAGING CENTER | Age: 43
End: 2018-07-29
Attending: NURSE PRACTITIONER
Payer: COMMERCIAL

## 2018-07-29 ENCOUNTER — OFFICE VISIT (OUTPATIENT)
Dept: URGENT CARE | Facility: PHYSICIAN GROUP | Age: 43
End: 2018-07-29
Payer: COMMERCIAL

## 2018-07-29 VITALS
HEART RATE: 68 BPM | RESPIRATION RATE: 16 BRPM | TEMPERATURE: 98 F | SYSTOLIC BLOOD PRESSURE: 132 MMHG | DIASTOLIC BLOOD PRESSURE: 94 MMHG | OXYGEN SATURATION: 98 % | WEIGHT: 250 LBS | BODY MASS INDEX: 33.86 KG/M2 | HEIGHT: 72 IN

## 2018-07-29 DIAGNOSIS — S39.012A STRAIN OF LUMBAR REGION, INITIAL ENCOUNTER: ICD-10-CM

## 2018-07-29 LAB
APPEARANCE UR: NORMAL
BILIRUB UR STRIP-MCNC: NORMAL MG/DL
COLOR UR AUTO: NORMAL
GLUCOSE UR STRIP.AUTO-MCNC: NORMAL MG/DL
KETONES UR STRIP.AUTO-MCNC: NORMAL MG/DL
LEUKOCYTE ESTERASE UR QL STRIP.AUTO: NORMAL
NITRITE UR QL STRIP.AUTO: NORMAL
PH UR STRIP.AUTO: 8 [PH] (ref 5–8)
PROT UR QL STRIP: NORMAL MG/DL
RBC UR QL AUTO: NORMAL
SP GR UR STRIP.AUTO: 1.01
UROBILINOGEN UR STRIP-MCNC: 0.2 MG/DL

## 2018-07-29 PROCEDURE — 99214 OFFICE O/P EST MOD 30 MIN: CPT | Performed by: NURSE PRACTITIONER

## 2018-07-29 PROCEDURE — 72100 X-RAY EXAM L-S SPINE 2/3 VWS: CPT | Mod: TC,FY | Performed by: NURSE PRACTITIONER

## 2018-07-29 PROCEDURE — 81002 URINALYSIS NONAUTO W/O SCOPE: CPT | Performed by: NURSE PRACTITIONER

## 2018-07-29 RX ORDER — CYCLOBENZAPRINE HCL 10 MG
10 TABLET ORAL 3 TIMES DAILY PRN
Qty: 30 TAB | Refills: 0 | Status: SHIPPED | OUTPATIENT
Start: 2018-07-29 | End: 2018-08-23

## 2018-07-29 NOTE — PROGRESS NOTES
Subjective:      Jensen Donald is a 43 y.o. male who presents with Back Pain (lower R back pain/ non-injury related)    Past Medical History:   Diagnosis Date   • A-fib (HCC)     4 years ago, one episode only, echo/stress test normal   • Geographic tongue    • Hypertension      Social History     Social History   • Marital status:      Spouse name: N/A   • Number of children: N/A   • Years of education: N/A     Occupational History   • Not on file.     Social History Main Topics   • Smoking status: Former Smoker     Packs/day: 0.25     Years: 1.00     Types: Cigarettes   • Smokeless tobacco: Never Used   • Alcohol use 1.8 oz/week     1 Glasses of wine, 1 Cans of beer, 1 Shots of liquor per week      Comment: rarely   • Drug use: No   • Sexual activity: Yes     Partners: Female      Comment: wife, Fern     Other Topics Concern   • Not on file     Social History Narrative   • No narrative on file     Family History   Problem Relation Age of Onset   • Diabetes Mother        Allergies: Diltiazem hcl; Lisinopril; and Other drug    Patient's 43-year-old male who presents to complaint of lower back pain to the right side. This occasionally radiates into his buttock and down into the thigh. States he's had back pain before and he is concerned about this.    Secondly, states he's had some frequency of urination and is concerned about the possibility of a urinary tract infection. No urethral discharge, no possibility of exposure to sexually transmitted infections per patient.        Other   This is a new problem. The current episode started in the past 7 days. The problem occurs constantly. The problem has been unchanged. Pertinent negatives include no chills or fever. The symptoms are aggravated by bending and twisting. He has tried nothing for the symptoms. The treatment provided no relief.       Review of Systems   Constitutional: Negative for chills, fever and malaise/fatigue.   Musculoskeletal: Positive for  back pain.   All other systems reviewed and are negative.         Objective:     /94   Pulse 68   Temp 36.7 °C (98 °F)   Resp 16   Ht 1.829 m (6')   Wt 113.4 kg (250 lb)   SpO2 98%   BMI 33.91 kg/m²      Physical Exam   Constitutional: He is oriented to person, place, and time. He appears well-developed and well-nourished.   Neurological: He is alert and oriented to person, place, and time.   Skin: Skin is warm and dry.   Psychiatric: He has a normal mood and affect. His behavior is normal. Judgment and thought content normal.   Vitals reviewed.      Heart rate and rhythm are regular with S1 and S2 no murmurs    No CVA tenderness, no suprapubic tenderness.    There is mild point tenderness to the right lower back radiating into the right buttock. Pain is reproduced with range of motion.      UA:    X-ray, lumbar spine:    7/29/2018 2:54 PM    HISTORY/REASON FOR EXAM:  Low back pain for 3 days.      TECHNIQUE/ EXAM DESCRIPTION AND NUMBER OF VIEWS:  3 views of the lumbar spine.    COMPARISON: None.    FINDINGS:  The alignment of the lumbar spine is within normal limits.    There is no acute bony process.    There is mild degenerative disc disease with endplate spurring at the L3-4, L4-5 and L5-S1 levels. There is bilateral mild facet disease at L4-5 and L5-S1.    The SI joints and visualized pelvic structures are unremarkable.   Impression       1.  There is mild degenerative disc disease and arthropathy in the mid and lower lumbar spine as noted above.       UA: trace blood, negative leukocytes, negative nitrates             Assessment/Plan:     1. Strain of lumbar region, initial encounter  2. Left sided low back pain  -ibuprofen  -flexeril PRN at night only  -Order given for MRI at patient's request  -Referral given for chiropractor at patient's request  -follow up otherwise for persistent or worsening of pain.

## 2018-08-02 ENCOUNTER — HOSPITAL ENCOUNTER (EMERGENCY)
Facility: MEDICAL CENTER | Age: 43
End: 2018-08-02
Attending: EMERGENCY MEDICINE
Payer: COMMERCIAL

## 2018-08-02 ENCOUNTER — APPOINTMENT (OUTPATIENT)
Dept: RADIOLOGY | Facility: MEDICAL CENTER | Age: 43
End: 2018-08-02
Attending: EMERGENCY MEDICINE
Payer: COMMERCIAL

## 2018-08-02 VITALS
OXYGEN SATURATION: 97 % | SYSTOLIC BLOOD PRESSURE: 154 MMHG | TEMPERATURE: 98 F | HEIGHT: 72 IN | RESPIRATION RATE: 16 BRPM | DIASTOLIC BLOOD PRESSURE: 100 MMHG | WEIGHT: 253.31 LBS | HEART RATE: 59 BPM | BODY MASS INDEX: 34.31 KG/M2

## 2018-08-02 DIAGNOSIS — M62.838 MUSCLE SPASM: ICD-10-CM

## 2018-08-02 DIAGNOSIS — M62.830 SPASM OF LUMBAR PARASPINOUS MUSCLE: ICD-10-CM

## 2018-08-02 LAB
ANION GAP SERPL CALC-SCNC: 6 MMOL/L (ref 0–11.9)
APPEARANCE UR: CLEAR
BASOPHILS # BLD AUTO: 0.7 % (ref 0–1.8)
BASOPHILS # BLD: 0.03 K/UL (ref 0–0.12)
BILIRUB UR QL STRIP.AUTO: NEGATIVE
BUN SERPL-MCNC: 21 MG/DL (ref 8–22)
CALCIUM SERPL-MCNC: 9.2 MG/DL (ref 8.5–10.5)
CHLORIDE SERPL-SCNC: 103 MMOL/L (ref 96–112)
CO2 SERPL-SCNC: 26 MMOL/L (ref 20–33)
COLOR UR: YELLOW
CREAT SERPL-MCNC: 0.96 MG/DL (ref 0.5–1.4)
EOSINOPHIL # BLD AUTO: 0.14 K/UL (ref 0–0.51)
EOSINOPHIL NFR BLD: 3.3 % (ref 0–6.9)
ERYTHROCYTE [DISTWIDTH] IN BLOOD BY AUTOMATED COUNT: 35.6 FL (ref 35.9–50)
GLUCOSE SERPL-MCNC: 89 MG/DL (ref 65–99)
GLUCOSE UR STRIP.AUTO-MCNC: NEGATIVE MG/DL
HCT VFR BLD AUTO: 43.7 % (ref 42–52)
HGB BLD-MCNC: 15.6 G/DL (ref 14–18)
IMM GRANULOCYTES # BLD AUTO: 0.01 K/UL (ref 0–0.11)
IMM GRANULOCYTES NFR BLD AUTO: 0.2 % (ref 0–0.9)
KETONES UR STRIP.AUTO-MCNC: NEGATIVE MG/DL
LEUKOCYTE ESTERASE UR QL STRIP.AUTO: NEGATIVE
LYMPHOCYTES # BLD AUTO: 1.75 K/UL (ref 1–4.8)
LYMPHOCYTES NFR BLD: 41.4 % (ref 22–41)
MCH RBC QN AUTO: 29.8 PG (ref 27–33)
MCHC RBC AUTO-ENTMCNC: 35.7 G/DL (ref 33.7–35.3)
MCV RBC AUTO: 83.6 FL (ref 81.4–97.8)
MICRO URNS: ABNORMAL
MONOCYTES # BLD AUTO: 0.34 K/UL (ref 0–0.85)
MONOCYTES NFR BLD AUTO: 8 % (ref 0–13.4)
NEUTROPHILS # BLD AUTO: 1.96 K/UL (ref 1.82–7.42)
NEUTROPHILS NFR BLD: 46.4 % (ref 44–72)
NITRITE UR QL STRIP.AUTO: NEGATIVE
NRBC # BLD AUTO: 0 K/UL
NRBC BLD-RTO: 0 /100 WBC
PH UR STRIP.AUTO: 8.5 [PH]
PLATELET # BLD AUTO: 207 K/UL (ref 164–446)
PMV BLD AUTO: 10.5 FL (ref 9–12.9)
POTASSIUM SERPL-SCNC: 4.1 MMOL/L (ref 3.6–5.5)
PROT UR QL STRIP: NEGATIVE MG/DL
RBC # BLD AUTO: 5.23 M/UL (ref 4.7–6.1)
RBC UR QL AUTO: NEGATIVE
SODIUM SERPL-SCNC: 135 MMOL/L (ref 135–145)
SP GR UR STRIP.AUTO: 1.01
UROBILINOGEN UR STRIP.AUTO-MCNC: 0.2 MG/DL
WBC # BLD AUTO: 4.2 K/UL (ref 4.8–10.8)

## 2018-08-02 PROCEDURE — 96374 THER/PROPH/DIAG INJ IV PUSH: CPT

## 2018-08-02 PROCEDURE — 99285 EMERGENCY DEPT VISIT HI MDM: CPT

## 2018-08-02 PROCEDURE — 96375 TX/PRO/DX INJ NEW DRUG ADDON: CPT

## 2018-08-02 PROCEDURE — 85025 COMPLETE CBC W/AUTO DIFF WBC: CPT

## 2018-08-02 PROCEDURE — 80048 BASIC METABOLIC PNL TOTAL CA: CPT

## 2018-08-02 PROCEDURE — 700111 HCHG RX REV CODE 636 W/ 250 OVERRIDE (IP): Performed by: EMERGENCY MEDICINE

## 2018-08-02 PROCEDURE — 74176 CT ABD & PELVIS W/O CONTRAST: CPT

## 2018-08-02 PROCEDURE — 81003 URINALYSIS AUTO W/O SCOPE: CPT

## 2018-08-02 RX ORDER — NAPROXEN 500 MG/1
500 TABLET ORAL 2 TIMES DAILY WITH MEALS
Qty: 20 TAB | Refills: 0 | Status: SHIPPED | OUTPATIENT
Start: 2018-08-02 | End: 2018-08-23

## 2018-08-02 RX ORDER — ONDANSETRON 2 MG/ML
4 INJECTION INTRAMUSCULAR; INTRAVENOUS ONCE
Status: COMPLETED | OUTPATIENT
Start: 2018-08-02 | End: 2018-08-02

## 2018-08-02 RX ORDER — KETOROLAC TROMETHAMINE 30 MG/ML
30 INJECTION, SOLUTION INTRAMUSCULAR; INTRAVENOUS ONCE
Status: COMPLETED | OUTPATIENT
Start: 2018-08-03 | End: 2018-08-02

## 2018-08-02 RX ORDER — MORPHINE SULFATE 4 MG/ML
4 INJECTION, SOLUTION INTRAMUSCULAR; INTRAVENOUS ONCE
Status: COMPLETED | OUTPATIENT
Start: 2018-08-02 | End: 2018-08-02

## 2018-08-02 RX ADMIN — ONDANSETRON 4 MG: 2 INJECTION, SOLUTION INTRAMUSCULAR; INTRAVENOUS at 22:45

## 2018-08-02 RX ADMIN — MORPHINE SULFATE 4 MG: 4 INJECTION INTRAVENOUS at 22:45

## 2018-08-02 RX ADMIN — KETOROLAC TROMETHAMINE 30 MG: 30 INJECTION, SOLUTION INTRAMUSCULAR at 23:48

## 2018-08-02 ASSESSMENT — PAIN SCALES - GENERAL
PAINLEVEL_OUTOF10: 3
PAINLEVEL_OUTOF10: 4

## 2018-08-03 NOTE — ED TRIAGE NOTES
Jensen Donald  43 y.o.  male  Chief Complaint   Patient presents with   • Back Pain     x  1 week   • Side Pain     x 1 week     Present to triage c/o mid back / side pain x 1 week. Denies N/V. Constant cramping as described.

## 2018-08-03 NOTE — ED PROVIDER NOTES
ED Provider Note    ED Provider Note      Primary care provider: BRYAN Ma    CHIEF COMPLAINT  Chief Complaint   Patient presents with   • Back Pain     x  1 week   • Side Pain     x 1 week       HPI  Jensen Donald is a 43 y.o. male who presents to the Emergency Department with chief complaint of bilateral flank and back pain.  Patient's been experiencing this for approximately 1 week.  Stated he initially noticed it when it moved awkwardly in his car had very minimal pain for 2 days after that and then has gotten progressive pain since then.  He reports the pain is primarily in his left flank somewhat radiates down in the left pelvis is also had some minimal pain in the right flank.  States is moderate he has noted no alleviating or aggravating factors.  He was seen at a urgent treatment center had x-rays of the lumbar spine was diagnosed with lumbar spasm treated with ibuprofen and Flexeril.  Patient also relates that just before all of these symptoms started he started taking new physical fitness supplements including Hydroxycut.    REVIEW OF SYSTEMS  10 systems reviewed and otherwise negative, pertinent positives and negatives listed in the history of present illness.    PAST MEDICAL HISTORY   has a past medical history of A-fib (HCC); Geographic tongue; and Hypertension.    SURGICAL HISTORY  patient denies any surgical history    SOCIAL HISTORY  Social History   Substance Use Topics   • Smoking status: Former Smoker     Packs/day: 0.25     Years: 1.00     Types: Cigarettes   • Smokeless tobacco: Never Used   • Alcohol use 1.8 oz/week     1 Glasses of wine, 1 Cans of beer, 1 Shots of liquor per week      Comment: rarely      History   Drug Use No       FAMILY HISTORY  Non-Contributory    CURRENT MEDICATIONS  Ibuprofen and cyclobenzaprine    ALLERGIES  Allergies   Allergen Reactions   • Diltiazem Hcl      Other reaction(s): Headache   • Lisinopril      cough   • Other Drug      Can't  remember.  Few yrs ago. Anti-anxiety pill       PHYSICAL EXAM  VITAL SIGNS: /95   Pulse 69   Temp 36.7 °C (98 °F)   Resp 14   Ht 1.829 m (6')   Wt 114.9 kg (253 lb 4.9 oz)   SpO2 97%   BMI 34.35 kg/m²   Pulse ox interpretation: I interpret this pulse ox as normal.  Constitutional: Alert and oriented x 3, minimal distress  HEENT: Atraumatic normocephalic, pupils are equal round reactive to light extraocular movements are intact. The nares is clear, external ears are normal, mouth shows moist mucous membranes  Neck: Supple, no JVD no tracheal deviation  Cardiovascular: Regular rate and rhythm no murmur rub or gallop 2+ pulses peripherally x4  Thorax & Lungs: No respiratory distress, no wheezes rales or rhonchi, No chest tenderness.   GI: Slight tenderness to be palpation in bilateral flanks left greater than right no other focal tenderness no rebound no guarding positive bowel sounds  Skin: Warm dry no acute rash or lesion  Musculoskeletal: Moving all extremities with full range and 5 of 5 strength, no acute  deformity ambulates without difficulty  Neurologic: Cranial nerves III through XII are grossly intact, no sensory deficit, no cerebellar dysfunction   Psychiatric: Appropriate affect for situation at this time      DIAGNOSTIC STUDIES / PROCEDURES  LABS      Results for orders placed or performed during the hospital encounter of 08/02/18   CBC WITH DIFFERENTIAL   Result Value Ref Range    WBC 4.2 (L) 4.8 - 10.8 K/uL    RBC 5.23 4.70 - 6.10 M/uL    Hemoglobin 15.6 14.0 - 18.0 g/dL    Hematocrit 43.7 42.0 - 52.0 %    MCV 83.6 81.4 - 97.8 fL    MCH 29.8 27.0 - 33.0 pg    MCHC 35.7 (H) 33.7 - 35.3 g/dL    RDW 35.6 (L) 35.9 - 50.0 fL    Platelet Count 207 164 - 446 K/uL    MPV 10.5 9.0 - 12.9 fL    Neutrophils-Polys 46.40 44.00 - 72.00 %    Lymphocytes 41.40 (H) 22.00 - 41.00 %    Monocytes 8.00 0.00 - 13.40 %    Eosinophils 3.30 0.00 - 6.90 %    Basophils 0.70 0.00 - 1.80 %    Immature Granulocytes 0.20  0.00 - 0.90 %    Nucleated RBC 0.00 /100 WBC    Neutrophils (Absolute) 1.96 1.82 - 7.42 K/uL    Lymphs (Absolute) 1.75 1.00 - 4.80 K/uL    Monos (Absolute) 0.34 0.00 - 0.85 K/uL    Eos (Absolute) 0.14 0.00 - 0.51 K/uL    Baso (Absolute) 0.03 0.00 - 0.12 K/uL    Immature Granulocytes (abs) 0.01 0.00 - 0.11 K/uL    NRBC (Absolute) 0.00 K/uL   BASIC METABOLIC PANEL   Result Value Ref Range    Sodium 135 135 - 145 mmol/L    Potassium 4.1 3.6 - 5.5 mmol/L    Chloride 103 96 - 112 mmol/L    Co2 26 20 - 33 mmol/L    Glucose 89 65 - 99 mg/dL    Bun 21 8 - 22 mg/dL    Creatinine 0.96 0.50 - 1.40 mg/dL    Calcium 9.2 8.5 - 10.5 mg/dL    Anion Gap 6.0 0.0 - 11.9   URINALYSIS CULTURE, IF INDICATED   Result Value Ref Range    Color Yellow     Character Clear     Specific Gravity 1.015 <1.035    Ph 8.5 (A) 5.0 - 8.0    Glucose Negative Negative mg/dL    Ketones Negative Negative mg/dL    Protein Negative Negative mg/dL    Bilirubin Negative Negative    Urobilinogen, Urine 0.2 Negative    Nitrite Negative Negative    Leukocyte Esterase Negative Negative    Occult Blood Negative Negative    Micro Urine Req see below    ESTIMATED GFR   Result Value Ref Range    GFR If African American >60 >60 mL/min/1.73 m 2    GFR If Non African American >60 >60 mL/min/1.73 m 2       All labs reviewed by me.      RADIOLOGY  CT-RENAL COLIC EVALUATION(A/P W/O)   Final Result      No evidence of abdominal or pelvic mass, adenopathy, or inflammatory change.  The study is however limited due to nonuse of intravenous contrast.           The radiologist's interpretation of all radiological studies have been reviewed by me.    COURSE & MEDICAL DECISION MAKING  Pertinent Labs & Imaging studies reviewed. (See chart for details)    10:03 PM - Patient seen and examined at bedside.         Patient noted to have slightly elevated blood pressure likely circumstantial secondary to presenting complaint. Referred to primary care physician for further  evaluation.        Medical Decision Making: Labs as above unremarkable including normal urinalysis CT scan of the abdomen pelvis renal colic protocol shows no obstructive uropathy no other acute intra-abdominal findings.  Patient feeling better with interventions in the emergency likely musculoskeletal at this time is instructed to continue Flexeril will be initiated on naproxen instructed to avoid all other NSAIDs Tylenol for additional pain control return for any further abdominal pain any urinary or stooling complaints nausea vomiting fever chills any other acute concerns otherwise follow-up with primary care discharged in stable and improved condition repeat abdominal exam and repeat vital signs benign    /100   Pulse (!) 59   Temp 36.7 °C (98 °F)   Resp 16   Ht 1.829 m (6')   Wt 114.9 kg (253 lb 4.9 oz)   SpO2 97%   BMI 34.35 kg/m²     Grace Reyes, A.P.R.N.  [address] [place] [address] [phone]    In 2 days      [facility], Emergency Dept  [address] [facility] [address] [facility] [address] [phone]    If symptoms worsen      New Prescriptions    No medications on file       FINAL IMPRESSION  1. Muscle spasm    2. Spasm of lumbar paraspinous muscle    3.  Abdominal pain, resolved      This dictation has been created using voice recognition software and/or scribes. The accuracy of the dictation is limited by the abilities of the software and the expertise of the scribes. I expect there may be some errors of grammar and possibly content. I made every attempt to manually correct the errors within my dictation. However, errors related to voice recognition software and/or scribes may still exist and should be interpreted within the appropriate context.

## 2018-08-23 ENCOUNTER — OFFICE VISIT (OUTPATIENT)
Dept: MEDICAL GROUP | Facility: PHYSICIAN GROUP | Age: 43
End: 2018-08-23
Payer: COMMERCIAL

## 2018-08-23 VITALS
TEMPERATURE: 97 F | DIASTOLIC BLOOD PRESSURE: 70 MMHG | BODY MASS INDEX: 33.46 KG/M2 | HEIGHT: 72 IN | WEIGHT: 247 LBS | SYSTOLIC BLOOD PRESSURE: 124 MMHG | HEART RATE: 70 BPM | OXYGEN SATURATION: 97 % | RESPIRATION RATE: 16 BRPM

## 2018-08-23 DIAGNOSIS — M54.50 ACUTE RIGHT-SIDED LOW BACK PAIN WITHOUT SCIATICA: ICD-10-CM

## 2018-08-23 PROCEDURE — 99214 OFFICE O/P EST MOD 30 MIN: CPT | Performed by: NURSE PRACTITIONER

## 2018-08-23 RX ORDER — NAPROXEN 500 MG/1
500 TABLET ORAL 2 TIMES DAILY WITH MEALS
Qty: 60 TAB | Refills: 0 | Status: SHIPPED | OUTPATIENT
Start: 2018-08-23 | End: 2018-11-26

## 2018-08-23 RX ORDER — TIZANIDINE 4 MG/1
4 TABLET ORAL 3 TIMES DAILY
Qty: 30 TAB | Refills: 0 | Status: SHIPPED | OUTPATIENT
Start: 2018-08-23 | End: 2018-11-26

## 2018-08-23 ASSESSMENT — PATIENT HEALTH QUESTIONNAIRE - PHQ9: CLINICAL INTERPRETATION OF PHQ2 SCORE: 0

## 2018-08-23 NOTE — PATIENT INSTRUCTIONS
Follow up with me in 3 months, sooner if needed    Will wait for MRI    Referral to PT    Naproxen and Tizanidine

## 2018-08-23 NOTE — PROGRESS NOTES
Chief Complaint   Patient presents with   • Back Pain     lump R side, lower back         This is a 43 y.o.male patient that presents today with the following: Follow-up ER and urgent care visits for back pain    Acute right-sided low back pain without sciatica  Patient here for follow-up visit status post urgent care and ER visit for right lower back pain.  He was initially seen in the urgent care for what was diagnosed as musculoskeletal strain, he has been doing workout at the gym.  He was given anti-inflammatory and muscle relaxer with, thus he went to the emergency room as he was concerned for a kidney infection or a kidney stone because he was also having frequent urination.  Workup in the emergency room was normal no evidence of infection or kidney stone.  Today he states he still has the pain in the right lower back which is tender with palpation.  I did discuss with him that I agree that this is likely musculoskeletal strain and that we can try different anti-inflammatory and muscle relaxer, these were called in.  I also advised physical therapy, referral has been placed.  He tells me that an MRI has been ordered of his low back, we will await those results and he will be notified as results become available      Admission on 08/02/2018, Discharged on 08/02/2018   Component Date Value   • WBC 08/02/2018 4.2*   • RBC 08/02/2018 5.23    • Hemoglobin 08/02/2018 15.6    • Hematocrit 08/02/2018 43.7    • MCV 08/02/2018 83.6    • MCH 08/02/2018 29.8    • MCHC 08/02/2018 35.7*   • RDW 08/02/2018 35.6*   • Platelet Count 08/02/2018 207    • MPV 08/02/2018 10.5    • Neutrophils-Polys 08/02/2018 46.40    • Lymphocytes 08/02/2018 41.40*   • Monocytes 08/02/2018 8.00    • Eosinophils 08/02/2018 3.30    • Basophils 08/02/2018 0.70    • Immature Granulocytes 08/02/2018 0.20    • Nucleated RBC 08/02/2018 0.00    • Neutrophils (Absolute) 08/02/2018 1.96    • Lymphs (Absolute) 08/02/2018 1.75    • Monos (Absolute)  08/02/2018 0.34    • Eos (Absolute) 08/02/2018 0.14    • Baso (Absolute) 08/02/2018 0.03    • Immature Granulocytes (a* 08/02/2018 0.01    • NRBC (Absolute) 08/02/2018 0.00    • Sodium 08/02/2018 135    • Potassium 08/02/2018 4.1    • Chloride 08/02/2018 103    • Co2 08/02/2018 26    • Glucose 08/02/2018 89    • Bun 08/02/2018 21    • Creatinine 08/02/2018 0.96    • Calcium 08/02/2018 9.2    • Anion Gap 08/02/2018 6.0    • Color 08/02/2018 Yellow    • Character 08/02/2018 Clear    • Specific Gravity 08/02/2018 1.015    • Ph 08/02/2018 8.5*   • Glucose 08/02/2018 Negative    • Ketones 08/02/2018 Negative    • Protein 08/02/2018 Negative    • Bilirubin 08/02/2018 Negative    • Urobilinogen, Urine 08/02/2018 0.2    • Nitrite 08/02/2018 Negative    • Leukocyte Esterase 08/02/2018 Negative    • Occult Blood 08/02/2018 Negative    • Micro Urine Req 08/02/2018 see below    • GFR If  08/02/2018 >60    • GFR If Non  Ameri* 08/02/2018 >60    Office Visit on 07/29/2018   Component Date Value   • POC Color 07/29/2018 Dark yellow    • POC Appearance 07/29/2018 Cloudy    • POC Leukocyte Esterase 07/29/2018 Neg    • POC Nitrites 07/29/2018 Neg    • POC Urobiligen 07/29/2018 0.2    • POC Protein 07/29/2018 Trace    • POC Urine PH 07/29/2018 8.0    • POC Blood 07/29/2018 Trace    • POC Specific Gravity 07/29/2018 1.010    • POC Ketones 07/29/2018 neg    • POC Bilirubin 07/29/2018 neg    • POC Glucose 07/29/2018 neg          clinical course has been stable    Past Medical History:   Diagnosis Date   • A-fib (HCC)     4 years ago, one episode only, echo/stress test normal   • Geographic tongue    • Hypertension        No past surgical history on file.    Family History   Problem Relation Age of Onset   • Diabetes Mother        Diltiazem hcl; Lisinopril; and Other drug    Current Outpatient Prescriptions Ordered in [facility]   Medication Sig Dispense Refill   • tizanidine (ZANAFLEX) 4 MG Tab Take 1 Tab by mouth 3  times a day. 30 Tab 0   • naproxen (NAPROSYN) 500 MG Tab Take 1 Tab by mouth 2 times a day, with meals. 60 Tab 0   • amitriptyline (ELAVIL) 25 MG Tab Take 1 Tab by mouth as needed for Other. 90 Tab 0     No current Epic-ordered facility-administered medications on file.        Constitutional ROS: No unexpected change in weight, No weakness, No unexplained fevers, sweats, or chills  Pulmonary ROS: No chronic cough, sputum, or hemoptysis, No shortness of breath, No recent change in breathing  Cardiovascular ROS: No chest pain, No edema, No palpitations  Musculoskeletal/Extremities ROS: Positive per HPI  Neurologic ROS: Normal development, No seizures, No weakness    Physical exam:  /70   Pulse 70   Temp 36.1 °C (97 °F)   Resp 16   Ht 1.829 m (6')   Wt 112 kg (247 lb)   SpO2 97%   BMI 33.50 kg/m²   General Appearance: Middle-aged male, alert, no distress, obese, well-groomed  Skin: Skin color, texture, turgor normal. No rashes or lesions.  Back: Positive for tenderness with palpation to right lower back  Lungs: negative findings: normal respiratory rate and rhythm, lungs clear to auscultation  Musculoskeletal: negative findings: no evidence of joint instability, no evidence of muscle atrophy, no deformities present  Neurologic: intact, CN II through XII grossly intact    Medical decision making/discussion: No medications called in for what is likely musculoskeletal strain in the right lower back, including naproxen and tizanidine.  Advised him to take naproxen with food not to drive while taking tizanidine due to sedating effects.  He has been referred to physical therapy.  I will notify him of results of the MRI as soon as they become available.  Would like to see him back in 3 months, sooner if needed.    Jensen was seen today for back pain.    Diagnoses and all orders for this visit:    Acute right-sided low back pain without sciatica  -     REFERRAL TO PHYSICAL THERAPY Reason for Therapy:  Eval/Treat/Report  -     tizanidine (ZANAFLEX) 4 MG Tab; Take 1 Tab by mouth 3 times a day.  -     naproxen (NAPROSYN) 500 MG Tab; Take 1 Tab by mouth 2 times a day, with meals.          Please note that this dictation was created using voice recognition software. I have made every reasonable attempt to correct obvious errors, but I expect that there are errors of grammar and possibly content that I did not discover before finalizing the note.

## 2018-08-26 NOTE — ASSESSMENT & PLAN NOTE
Patient here for follow-up visit status post urgent care and ER visit for right lower back pain.  He was initially seen in the urgent care for what was diagnosed as musculoskeletal strain, he has been doing workout at the gym.  He was given anti-inflammatory and muscle relaxer with, thus he went to the emergency room as he was concerned for a kidney infection or a kidney stone because he was also having frequent urination.  Workup in the emergency room was normal no evidence of infection or kidney stone.  Today he states he still has the pain in the right lower back which is tender with palpation.  I did discuss with him that I agree that this is likely musculoskeletal strain and that we can try different anti-inflammatory and muscle relaxer, these were called in.  I also advised physical therapy, referral has been placed.  He tells me that an MRI has been ordered of his low back, we will await those results and he will be notified as results become available

## 2018-09-04 ENCOUNTER — HOSPITAL ENCOUNTER (OUTPATIENT)
Dept: RADIOLOGY | Facility: MEDICAL CENTER | Age: 43
End: 2018-09-04
Attending: NURSE PRACTITIONER
Payer: COMMERCIAL

## 2018-09-04 DIAGNOSIS — S39.012A STRAIN OF LUMBAR REGION, INITIAL ENCOUNTER: ICD-10-CM

## 2018-09-04 PROCEDURE — 72148 MRI LUMBAR SPINE W/O DYE: CPT

## 2018-09-11 ENCOUNTER — TELEPHONE (OUTPATIENT)
Dept: URGENT CARE | Facility: CLINIC | Age: 43
End: 2018-09-11

## 2018-09-11 DIAGNOSIS — M54.10 RADICULAR LEG PAIN: ICD-10-CM

## 2018-09-11 DIAGNOSIS — M51.36 BULGING LUMBAR DISC: ICD-10-CM

## 2018-09-11 NOTE — TELEPHONE ENCOUNTER
Attempted to call patient with  MRI results.  Patient is not at home but will be home from work later today.  Left a message with his spouse that the results were ready, and she did advise me that they had been able to review the results and the my chart system.  She states she will have the patient call back so that we can further discuss results.  No further questions at this time.

## 2018-11-26 ENCOUNTER — OFFICE VISIT (OUTPATIENT)
Dept: MEDICAL GROUP | Facility: PHYSICIAN GROUP | Age: 43
End: 2018-11-26
Payer: COMMERCIAL

## 2018-11-26 VITALS
BODY MASS INDEX: 34.54 KG/M2 | OXYGEN SATURATION: 98 % | HEIGHT: 72 IN | RESPIRATION RATE: 16 BRPM | HEART RATE: 86 BPM | DIASTOLIC BLOOD PRESSURE: 82 MMHG | WEIGHT: 255 LBS | TEMPERATURE: 97.5 F | SYSTOLIC BLOOD PRESSURE: 126 MMHG

## 2018-11-26 DIAGNOSIS — H69.93 DYSFUNCTION OF BOTH EUSTACHIAN TUBES: ICD-10-CM

## 2018-11-26 DIAGNOSIS — M54.50 ACUTE RIGHT-SIDED LOW BACK PAIN WITHOUT SCIATICA: ICD-10-CM

## 2018-11-26 DIAGNOSIS — I10 ESSENTIAL HYPERTENSION: ICD-10-CM

## 2018-11-26 DIAGNOSIS — E78.5 MILD HYPERLIPIDEMIA: ICD-10-CM

## 2018-11-26 PROCEDURE — 99214 OFFICE O/P EST MOD 30 MIN: CPT | Performed by: NURSE PRACTITIONER

## 2018-11-26 RX ORDER — FLUTICASONE PROPIONATE 50 MCG
1 SPRAY, SUSPENSION (ML) NASAL DAILY
Qty: 3 BOTTLE | Refills: 3 | Status: SHIPPED | OUTPATIENT
Start: 2018-11-26 | End: 2019-02-07

## 2018-11-26 NOTE — ASSESSMENT & PLAN NOTE
This is a chronic condition, stable and fairly well controlled with daily use of Flonase.  He does travel quite a bit over different elevations that seem to aggravate this.  He does deny other symptoms of allergic rhinitis.

## 2018-11-26 NOTE — ASSESSMENT & PLAN NOTE
Patient was referred to spine specialist as well as physical therapy back in late August after following up status post ER visit for acute low back pain, thought to be a kidney stone.  MRI results are as follows:      Impression       1.  Multilevel foraminal stenoses    2.  No significant spinal canal narrowing    3.  Multilevel annular disc bulge and facet arthropathy     He states that he never did hear from spine specialist to set up appointment, he was given contact information to make an appointment.  He has been followed by his chiropractor who is also reviewed these MRI results and was told that he will likely need surgery.  I did discuss with him that he will need to further discuss with spine specialist.  He is no longer taking anti-inflammatory or muscle relaxer.

## 2018-11-26 NOTE — PATIENT INSTRUCTIONS
Call the number below to set up appt with spine specialist    [facility]  [address] [place] [address]  Phone: [phone]  Fax: [phone]    Labs anytime, they are fasting    flonase called in

## 2018-11-26 NOTE — ASSESSMENT & PLAN NOTE
This is a chronic condition, stable and controlled well with lifestyle modifications.  Blood pressure today is 126/82 and he denies symptoms of hypertension.  He is due for labs, these have been ordered.

## 2018-11-26 NOTE — PROGRESS NOTES
Chief Complaint   Patient presents with   • Back Pain     fv, refill flonase         This is a 43 y.o.male patient that presents today with the following:Follow-up visit    Acute right-sided low back pain without sciatica  Patient was referred to spine specialist as well as physical therapy back in late August after following up status post ER visit for acute low back pain, thought to be a kidney stone.  MRI results are as follows:      Impression       1.  Multilevel foraminal stenoses    2.  No significant spinal canal narrowing    3.  Multilevel annular disc bulge and facet arthropathy     He states that he never did hear from spine specialist to set up appointment, he was given contact information to make an appointment.  He has been followed by his chiropractor who is also reviewed these MRI results and was told that he will likely need surgery.  I did discuss with him that he will need to further discuss with spine specialist.  He is no longer taking anti-inflammatory or muscle relaxer.      HTN (hypertension)  This is a chronic condition, stable and controlled well with lifestyle modifications.  Blood pressure today is 126/82 and he denies symptoms of hypertension.  He is due for labs, these have been ordered.    Mild hyperlipidemia  The 10-year ASCVD risk score ([place] DC Jr., et al., 2013) is: 1.5%    Values used to calculate the score:      Age: 43 years      Sex: Male      Is Non- : No      Diabetic: No      Tobacco smoker: No      Systolic Blood Pressure: 126 mmHg      Is BP treated: No      HDL Cholesterol: 48 mg/dL      Total Cholesterol: 191 mg/dL  Patient is due for labs, these have been ordered.  We discussed the importance of healthy diet, regular physical activity and continued efforts towards weight loss.    Dysfunction of both eustachian tubes  This is a chronic condition, stable and fairly well controlled with daily use of Flonase.  He does travel quite a bit over  different elevations that seem to aggravate this.  He does deny other symptoms of allergic rhinitis.      No visits with results within 1 Month(s) from this visit.   Latest known visit with results is:   Admission on 08/02/2018, Discharged on 08/02/2018   Component Date Value   • WBC 08/02/2018 4.2*   • RBC 08/02/2018 5.23    • Hemoglobin 08/02/2018 15.6    • Hematocrit 08/02/2018 43.7    • MCV 08/02/2018 83.6    • MCH 08/02/2018 29.8    • MCHC 08/02/2018 35.7*   • RDW 08/02/2018 35.6*   • Platelet Count 08/02/2018 207    • MPV 08/02/2018 10.5    • Neutrophils-Polys 08/02/2018 46.40    • Lymphocytes 08/02/2018 41.40*   • Monocytes 08/02/2018 8.00    • Eosinophils 08/02/2018 3.30    • Basophils 08/02/2018 0.70    • Immature Granulocytes 08/02/2018 0.20    • Nucleated RBC 08/02/2018 0.00    • Neutrophils (Absolute) 08/02/2018 1.96    • Lymphs (Absolute) 08/02/2018 1.75    • Monos (Absolute) 08/02/2018 0.34    • Eos (Absolute) 08/02/2018 0.14    • Baso (Absolute) 08/02/2018 0.03    • Immature Granulocytes (a* 08/02/2018 0.01    • NRBC (Absolute) 08/02/2018 0.00    • Sodium 08/02/2018 135    • Potassium 08/02/2018 4.1    • Chloride 08/02/2018 103    • Co2 08/02/2018 26    • Glucose 08/02/2018 89    • Bun 08/02/2018 21    • Creatinine 08/02/2018 0.96    • Calcium 08/02/2018 9.2    • Anion Gap 08/02/2018 6.0    • Color 08/02/2018 Yellow    • Character 08/02/2018 Clear    • Specific Gravity 08/02/2018 1.015    • Ph 08/02/2018 8.5*   • Glucose 08/02/2018 Negative    • Ketones 08/02/2018 Negative    • Protein 08/02/2018 Negative    • Bilirubin 08/02/2018 Negative    • Urobilinogen, Urine 08/02/2018 0.2    • Nitrite 08/02/2018 Negative    • Leukocyte Esterase 08/02/2018 Negative    • Occult Blood 08/02/2018 Negative    • Micro Urine Req 08/02/2018 see below    • GFR If  08/02/2018 >60    • GFR If Non  Ameri* 08/02/2018 >60          clinical course has been stable    Past Medical History:   Diagnosis  Date   • A-fib (HCC)     4 years ago, one episode only, echo/stress test normal   • Geographic tongue    • Hypertension        No past surgical history on file.    Family History   Problem Relation Age of Onset   • Diabetes Mother        Diltiazem hcl; Lisinopril; and Other drug    Current Outpatient Prescriptions Ordered in [facility]   Medication Sig Dispense Refill   • fluticasone (FLONASE) 50 MCG/ACT nasal spray Spray 1 Spray in nose every day. 3 Bottle 3   • amitriptyline (ELAVIL) 25 MG Tab Take 1 Tab by mouth as needed for Other. 90 Tab 0     No current Epic-ordered facility-administered medications on file.        Constitutional ROS: No unexpected change in weight, No weakness, No unexplained fevers, sweats, or chills  Ear ROS: Positive per HPI  Pulmonary ROS: No chronic cough, sputum, or hemoptysis, No shortness of breath, No recent change in breathing  Cardiovascular ROS: No chest pain, No edema, No palpitations, Positive for hypertension and hyperlipidemia  Musculoskeletal/Extremities ROS: Positive for low back pain, per HPI  Neurologic ROS: Normal development, No seizures, No weakness    Physical exam:  /82 (BP Location: Right arm, Patient Position: Sitting, BP Cuff Size: Adult long)   Pulse 86   Temp 36.4 °C (97.5 °F) (Temporal)   Resp 16   Ht 1.829 m (6')   Wt 115.7 kg (255 lb)   SpO2 98%   BMI 34.58 kg/m²   General Appearance: Middle-aged male, alert, no distress, obese, well-groomed  Skin: Skin color, texture, turgor normal. No rashes or lesions.  Back: mild pain to palpation in the right lower back  Lungs: negative findings: normal respiratory rate and rhythm, lungs clear to auscultation  Heart: negative. RRR without murmur, gallop, or rubs.  No ectopy.  Abdomen: Abdomen soft, non-tender. BS normal. No masses,  No organomegaly  Musculoskeletal: negative findings: no evidence of joint instability, strength normal, no deformities present  Neurologic: intact    Medical decision making/discussion:  Patient was given contact information on spine specialist, he is to call and set up appointment to go over MRI results and discuss further options for treatment.  He is due for labs in the next couple weeks, this has been ordered.  Flonase has been refilled per his request.  Like him to follow-up with me in 4-6 months, sooner if needed.    Jensen was seen today for back pain.    Diagnoses and all orders for this visit:    Dysfunction of both eustachian tubes  -     fluticasone (FLONASE) 50 MCG/ACT nasal spray; Spray 1 Spray in nose every day.    Mild hyperlipidemia  -     COMP METABOLIC PANEL; Future  -     Lipid Profile; Future    Essential hypertension  -     COMP METABOLIC PANEL; Future  -     Lipid Profile; Future    Acute right-sided low back pain without sciatica          Please note that this dictation was created using voice recognition software. I have made every reasonable attempt to correct obvious errors, but I expect that there are errors of grammar and possibly content that I did not discover before finalizing the note.

## 2018-11-26 NOTE — ASSESSMENT & PLAN NOTE
The 10-year ASCVD risk score (Krystal RODRIGUEZ Jr., et al., 2013) is: 1.5%    Values used to calculate the score:      Age: 43 years      Sex: Male      Is Non- : No      Diabetic: No      Tobacco smoker: No      Systolic Blood Pressure: 126 mmHg      Is BP treated: No      HDL Cholesterol: 48 mg/dL      Total Cholesterol: 191 mg/dL  Patient is due for labs, these have been ordered.  We discussed the importance of healthy diet, regular physical activity and continued efforts towards weight loss.

## 2018-12-10 ENCOUNTER — OFFICE VISIT (OUTPATIENT)
Dept: URGENT CARE | Facility: PHYSICIAN GROUP | Age: 43
End: 2018-12-10
Payer: COMMERCIAL

## 2018-12-10 VITALS
DIASTOLIC BLOOD PRESSURE: 88 MMHG | HEART RATE: 62 BPM | BODY MASS INDEX: 33.86 KG/M2 | WEIGHT: 250 LBS | RESPIRATION RATE: 16 BRPM | HEIGHT: 72 IN | OXYGEN SATURATION: 98 % | SYSTOLIC BLOOD PRESSURE: 128 MMHG | TEMPERATURE: 98.9 F

## 2018-12-10 DIAGNOSIS — S16.1XXA STRAIN OF NECK MUSCLE, INITIAL ENCOUNTER: ICD-10-CM

## 2018-12-10 PROCEDURE — 99214 OFFICE O/P EST MOD 30 MIN: CPT | Performed by: PHYSICIAN ASSISTANT

## 2018-12-10 RX ORDER — IBUPROFEN 600 MG/1
600 TABLET ORAL EVERY 6 HOURS PRN
Qty: 30 TAB | Refills: 0 | Status: SHIPPED | OUTPATIENT
Start: 2018-12-10 | End: 2019-02-21

## 2018-12-10 RX ORDER — KETOROLAC TROMETHAMINE 30 MG/ML
30 INJECTION, SOLUTION INTRAMUSCULAR; INTRAVENOUS ONCE
Status: COMPLETED | OUTPATIENT
Start: 2018-12-10 | End: 2018-12-10

## 2018-12-10 RX ADMIN — KETOROLAC TROMETHAMINE 30 MG: 30 INJECTION, SOLUTION INTRAMUSCULAR; INTRAVENOUS at 15:52

## 2018-12-10 ASSESSMENT — ENCOUNTER SYMPTOMS
VISUAL CHANGE: 0
WEAKNESS: 0
NECK PAIN: 1
FEVER: 0
HEADACHES: 1
TROUBLE SWALLOWING: 0
PARESIS: 0
NUMBNESS: 0
LEG PAIN: 0
PHOTOPHOBIA: 0
TINGLING: 0
SYNCOPE: 0
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
## 2018-12-10 NOTE — LETTER
D[facility]  [facility] [address] [address]     December 10, 2018    Patient: Jensen Donald   YOB: 1975   Date of Visit: 12/10/2018       To Whom It May Concern:    Jensen Donald was seen and treated in our department on 12/10/2018. Please excuse him from work on 12.10.18.    Sincerely,     Quinn Tello P.A.-C.

## 2019-02-07 ENCOUNTER — OFFICE VISIT (OUTPATIENT)
Dept: URGENT CARE | Facility: PHYSICIAN GROUP | Age: 44
End: 2019-02-07
Payer: COMMERCIAL

## 2019-02-07 VITALS
WEIGHT: 243 LBS | BODY MASS INDEX: 32.96 KG/M2 | OXYGEN SATURATION: 99 % | RESPIRATION RATE: 16 BRPM | SYSTOLIC BLOOD PRESSURE: 144 MMHG | TEMPERATURE: 99.7 F | HEART RATE: 84 BPM | DIASTOLIC BLOOD PRESSURE: 86 MMHG

## 2019-02-07 DIAGNOSIS — J03.90 ACUTE TONSILLITIS, UNSPECIFIED ETIOLOGY: Primary | ICD-10-CM

## 2019-02-07 DIAGNOSIS — J02.9 SORE THROAT: ICD-10-CM

## 2019-02-07 DIAGNOSIS — H69.93 DYSFUNCTION OF BOTH EUSTACHIAN TUBES: ICD-10-CM

## 2019-02-07 PROCEDURE — 99214 OFFICE O/P EST MOD 30 MIN: CPT | Performed by: NURSE PRACTITIONER

## 2019-02-07 RX ORDER — FLUTICASONE PROPIONATE 50 MCG
2 SPRAY, SUSPENSION (ML) NASAL DAILY
Qty: 1 BOTTLE | Refills: 0 | Status: SHIPPED | OUTPATIENT
Start: 2019-02-07 | End: 2019-02-21

## 2019-02-07 RX ORDER — AZITHROMYCIN 250 MG/1
TABLET, FILM COATED ORAL
Qty: 6 TAB | Refills: 0 | Status: SHIPPED | OUTPATIENT
Start: 2019-02-07 | End: 2019-02-21

## 2019-02-07 ASSESSMENT — ENCOUNTER SYMPTOMS
TROUBLE SWALLOWING: 0
HOARSE VOICE: 0
DIARRHEA: 0
SWOLLEN GLANDS: 0
SHORTNESS OF BREATH: 0
RHINORRHEA: 1
NECK PAIN: 0
ABDOMINAL PAIN: 0
COUGH: 0
SORE THROAT: 1
VOMITING: 0
STRIDOR: 0
HEADACHES: 0

## 2019-02-08 NOTE — PROGRESS NOTES
Subjective:      Jensen Donald is a 43 y.o. male who presents with Pharyngitis (ear fullness, cough x4 days)    Denies past medical, surgical or family history that is significant to today's problem.   RX or OTC medications-- reviewed with patient today.   Allergies   Allergen Reactions   • Diltiazem Hcl      Other reaction(s): Headache   • Lisinopril      cough   • Other Drug      Can't remember.  Few yrs ago. Anti-anxiety pill             Pharyngitis    This is a new problem. The current episode started in the past 7 days. The problem has been rapidly worsening. Maximum temperature: subjective. The pain is at a severity of 6/10. The pain is moderate. Associated symptoms include congestion and ear pain. Pertinent negatives include no abdominal pain, coughing, diarrhea, drooling, ear discharge, headaches, hoarse voice, plugged ear sensation, neck pain, shortness of breath, stridor, swollen glands, trouble swallowing or vomiting. He has had no exposure to strep or mono. He has tried NSAIDs for the symptoms. The treatment provided mild relief.   Otalgia    There is pain in the left ear. The current episode started in the past 7 days. The problem has been waxing and waning. The pain is at a severity of 5/10. Associated symptoms include rhinorrhea and a sore throat. Pertinent negatives include no abdominal pain, coughing, diarrhea, ear discharge, headaches, neck pain or vomiting. The treatment provided mild relief.        Review of Systems   HENT: Positive for congestion, ear pain, rhinorrhea and sore throat. Negative for drooling, ear discharge, hoarse voice and trouble swallowing.    Respiratory: Negative for cough, shortness of breath and stridor.    Gastrointestinal: Negative for abdominal pain, diarrhea and vomiting.   Musculoskeletal: Negative for neck pain.   Neurological: Negative for headaches.          Objective:     /86   Pulse 84   Temp 37.6 °C (99.7 °F)   Resp 16   Wt 110.2 kg (243 lb)   SpO2  99%   BMI 32.96 kg/m²      Physical Exam   Constitutional: He is oriented to person, place, and time. Vital signs are normal. He appears well-developed and well-nourished. He is cooperative.  Non-toxic appearance. He appears ill. No distress.   HENT:   Head: Normocephalic.   Mouth/Throat: Posterior oropharyngeal erythema present.   Eyes: Pupils are equal, round, and reactive to light. EOM are normal.   Neck: Normal range of motion. Neck supple.   Cardiovascular: Normal rate and regular rhythm.    Pulmonary/Chest: Effort normal.   Lymphadenopathy:        Head (right side): No submental, no submandibular, no tonsillar and no preauricular adenopathy present.        Head (left side): No submental, no submandibular, no tonsillar and no preauricular adenopathy present.     He has cervical adenopathy.        Right cervical: Superficial cervical adenopathy present.        Left cervical: No superficial cervical adenopathy present.        Right: No supraclavicular adenopathy present.        Left: No supraclavicular adenopathy present.   Neurological: He is alert and oriented to person, place, and time.   Skin: Skin is warm and dry.   Psychiatric: He has a normal mood and affect. His speech is normal and behavior is normal. Thought content normal.   Nursing note and vitals reviewed.        Strep screen: negative         Assessment/Plan:     1. Acute tonsillitis, unspecified etiology    - azithromycin (ZITHROMAX) 250 MG Tab; Take 2 tablets PO on day one, then 1 tablet PO on day two to five.  Dispense: 6 Tab; Refill: 0    2. Sore throat    - POCT Rapid Strep A    3. Dysfunction of both eustachian tubes    - fluticasone (FLONASE) 50 MCG/ACT nasal spray; Spray 2 Sprays in nose every day.  Dispense: 1 Bottle; Refill: 0    Educated in proper administration of medication(s) ordered today including safety, possible SE, risks, benefits, rationale and alternatives to therapy.   Return to clinic or PCP  5-7  days if current symptoms are  not resolving in a satisfactory manner or sooner if new or worsening symptoms occur.   Patient was advised of signs and symptoms which would warrant further evaluation and /or emergent evaluation in ER.  Verbalized agreement with this treatment plan and seemed to understand without barriers. Questions were encouraged and answered to patients satisfaction.   Aftercare instructions were given to pt/ caregiver.

## 2019-02-21 ENCOUNTER — OFFICE VISIT (OUTPATIENT)
Dept: URGENT CARE | Facility: PHYSICIAN GROUP | Age: 44
End: 2019-02-21
Payer: COMMERCIAL

## 2019-02-21 VITALS
SYSTOLIC BLOOD PRESSURE: 132 MMHG | TEMPERATURE: 99.4 F | HEART RATE: 86 BPM | WEIGHT: 250 LBS | DIASTOLIC BLOOD PRESSURE: 86 MMHG | BODY MASS INDEX: 33.91 KG/M2 | OXYGEN SATURATION: 97 % | RESPIRATION RATE: 16 BRPM

## 2019-02-21 DIAGNOSIS — J40 BRONCHITIS: Primary | ICD-10-CM

## 2019-02-21 DIAGNOSIS — J01.40 ACUTE NON-RECURRENT PANSINUSITIS: ICD-10-CM

## 2019-02-21 DIAGNOSIS — J06.9 URI WITH COUGH AND CONGESTION: ICD-10-CM

## 2019-02-21 PROCEDURE — 99214 OFFICE O/P EST MOD 30 MIN: CPT | Performed by: PHYSICIAN ASSISTANT

## 2019-02-21 RX ORDER — DOXYCYCLINE HYCLATE 100 MG
100 TABLET ORAL 2 TIMES DAILY
Qty: 14 TAB | Refills: 0 | Status: SHIPPED | OUTPATIENT
Start: 2019-02-21 | End: 2019-02-28

## 2019-02-21 RX ORDER — METHYLPREDNISOLONE 4 MG/1
TABLET ORAL
Qty: 21 TAB | Refills: 0 | Status: SHIPPED | OUTPATIENT
Start: 2019-02-21 | End: 2019-10-11

## 2019-02-21 RX ORDER — PROMETHAZINE HYDROCHLORIDE AND CODEINE PHOSPHATE 6.25; 1 MG/5ML; MG/5ML
5 SYRUP ORAL 4 TIMES DAILY PRN
Qty: 240 ML | Refills: 0 | Status: SHIPPED | OUTPATIENT
Start: 2019-02-21 | End: 2019-03-07

## 2019-02-21 NOTE — LETTER
February 21, 2019       Patient: Jensen Donald   YOB: 1975   Date of Visit: 2/21/2019         To Whom It May Concern:    It is my medical opinion that Jensen Donald may be excused from work for the dates of 2/21/19-2/24/19.      If you have any questions or concerns, please don't hesitate to call [phone]          Sincerely,          Thor Chavez P.A.-C.  Electronically Signed

## 2019-02-22 NOTE — PROGRESS NOTES
Subjective:      Pt is a 43 y.o. male who presents with Cough (congestion,runny nose,cough x2 days)            HPI  This is a new problem. PT presents to  clinic today complaining of sore throat, fevers, pressure in ears, cough, fatigue, runny nose, wheezing and SOB. PT denies CP, NVD, abdominal pain, joint pain. PT states these symptoms began around 2 days ago. PT states the pain is a 5-6/10 with coughing fits, aching in nature and worse at night. Pt has not taken any RX medications for this condition. The pt's medication list, problem list, and allergies have been evaluated and reviewed during today's visit.    PMH:  Past Medical History:   Diagnosis Date   • A-fib (HCC)     4 years ago, one episode only, echo/stress test normal   • Geographic tongue    • Hypertension        PSH:  Negative per pt.      Fam Hx:    family history includes Diabetes in his mother.  Family Status   Relation Status   • Mo        Soc HX:  Social History     Social History   • Marital status:      Spouse name: N/A   • Number of children: N/A   • Years of education: N/A     Occupational History   • Not on file.     Social History Main Topics   • Smoking status: Former Smoker     Packs/day: 0.25     Years: 1.00     Types: Cigarettes   • Smokeless tobacco: Never Used   • Alcohol use 1.8 oz/week     1 Glasses of wine, 1 Cans of beer, 1 Shots of liquor per week      Comment: rarely   • Drug use: No   • Sexual activity: Yes     Partners: Female      Comment: wife, Fern     Other Topics Concern   • Not on file     Social History Narrative   • No narrative on file         Medications:    Current Outpatient Prescriptions:   •  doxycycline (VIBRAMYCIN) 100 MG Tab, Take 1 Tab by mouth 2 times a day for 7 days., Disp: 14 Tab, Rfl: 0  •  MethylPREDNISolone (MEDROL DOSEPAK) 4 MG Tablet Therapy Pack, Use as directed, Disp: 21 Tab, Rfl: 0  •  promethazine-codeine (PHENERGAN-CODEINE) 6.25-10 MG/5ML Syrup, Take 5 mL by mouth 4 times a day  as needed for Cough for up to 14 days., Disp: 240 mL, Rfl: 0      Allergies:  Diltiazem hcl; Lisinopril; and Other drug    ROS    Review of Systems   Constitutional: Positive for malaise/fatigue. Negative for fever and diaphoresis.   HENT: Positive for congestion and sore throat. Negative for ear discharge, hearing loss, nosebleeds and tinnitus.    Eyes: Negative for blurred vision, double vision and photophobia.   Respiratory: Positive for cough, sputum production, shortness of breath and wheezing. Negative for hemoptysis.    Cardiovascular: Negative for chest pain and palpitations.   Gastrointestinal: Negative for nausea, vomiting, abdominal pain, diarrhea and constipation.   Genitourinary: Negative for dysuria and flank pain.   Musculoskeletal: Negative for joint pain and myalgias.   Skin: Negative for itching and rash.   Neurological:  Negative for dizziness, tingling and weakness.   Endo/Heme/Allergies: Does not bruise/bleed easily.   Psychiatric/Behavioral: Negative for depression. The patient is not nervous/anxious.           Objective:     /86   Pulse 86   Temp 37.4 °C (99.4 °F)   Resp 16   Wt 113.4 kg (250 lb)   SpO2 97%   BMI 33.91 kg/m²      Physical Exam      Physical Exam   Constitutional: PT is oriented to person, place, and time. PT appears well-developed and well-nourished. No distress.   HENT:   Head: Normocephalic and atraumatic.   Right Ear: Hearing, tympanic membrane, external ear and ear canal normal.   Left Ear: Hearing, tympanic membrane, external ear and ear canal normal.   Nose: Mucosal edema, rhinorrhea and sinus tenderness present. Right sinus exhibits frontal sinus tenderness. Left sinus exhibits frontal sinus tenderness.   Mouth/Throat: Uvula is midline. Mucous membranes are pale. Posterior oropharyngeal edema and posterior oropharyngeal erythema present. No oropharyngeal exudate.   Eyes: Conjunctivae normal and EOM are normal. Pupils are equal, round, and reactive to light.  Right eye exhibits no discharge. Left eye exhibits no discharge.   Neck: Normal range of motion. Neck supple. No thyromegaly present.   Cardiovascular: Normal rate, regular rhythm, normal heart sounds and intact distal pulses.  Exam reveals no gallop and no friction rub.    No murmur heard.  Pulmonary/Chest: Effort normal. No respiratory distress. PT has wheezes. PT has no rales. PT exhibits tenderness.   Abdominal: Soft. Bowel sounds are normal. PT exhibits no distension and no mass. There is no tenderness. There is no rebound and no guarding.   Musculoskeletal: Normal range of motion. PT exhibits no edema and no tenderness.   Lymphadenopathy:     PT has no cervical adenopathy.   Neurological: Pt is alert and oriented to person, place, and time. Pt has normal reflexes. No cranial nerve deficit.   Skin: Skin is warm and dry. No rash noted. No erythema.   Psychiatric: PT has a normal mood and affect. Pt behavior is normal. Judgment and thought content normal.          Assessment/Plan:     1. Bronchitis    - doxycycline (VIBRAMYCIN) 100 MG Tab; Take 1 Tab by mouth 2 times a day for 7 days.  Dispense: 14 Tab; Refill: 0  - MethylPREDNISolone (MEDROL DOSEPAK) 4 MG Tablet Therapy Pack; Use as directed  Dispense: 21 Tab; Refill: 0    2. Acute non-recurrent pansinusitis    - doxycycline (VIBRAMYCIN) 100 MG Tab; Take 1 Tab by mouth 2 times a day for 7 days.  Dispense: 14 Tab; Refill: 0  - MethylPREDNISolone (MEDROL DOSEPAK) 4 MG Tablet Therapy Pack; Use as directed  Dispense: 21 Tab; Refill: 0    3. URI with cough and congestion    - MethylPREDNISolone (MEDROL DOSEPAK) 4 MG Tablet Therapy Pack; Use as directed  Dispense: 21 Tab; Refill: 0  - promethazine-codeine (PHENERGAN-CODEINE) 6.25-10 MG/5ML Syrup; Take 5 mL by mouth 4 times a day as needed for Cough for up to 14 days.  Dispense: 240 mL; Refill: 0    Concern for worsening symptoms of Enterovirus D68 and worsening Bronchitis which shortly could transition to pneumonia  and worsening sinus congestion and infection with powerful cough keeping pt up at night as they must sleep upright to avoid coughing fits.  Diff DX: Bronchitis, Sinusitis, Pneumonia, Influenza, Viral URI, Allergies  Rest, fluids encouraged.  OTC decongestant for congestion/cough  Note given for work.  AVS with medical info given.  Pt was in full understanding and agreement with the plan.  Differential diagnosis, natural history, supportive care, and indications for immediate follow-up discussed. All questions answered. Patient agrees with the plan of care.  Follow-up as needed if symptoms worsen or fail to improve.

## 2019-02-22 NOTE — PATIENT INSTRUCTIONS

## 2019-07-02 ENCOUNTER — OFFICE VISIT (OUTPATIENT)
Dept: URGENT CARE | Facility: PHYSICIAN GROUP | Age: 44
End: 2019-07-02
Payer: COMMERCIAL

## 2019-07-02 ENCOUNTER — APPOINTMENT (OUTPATIENT)
Dept: RADIOLOGY | Facility: IMAGING CENTER | Age: 44
End: 2019-07-02
Attending: FAMILY MEDICINE
Payer: COMMERCIAL

## 2019-07-02 VITALS
DIASTOLIC BLOOD PRESSURE: 86 MMHG | OXYGEN SATURATION: 98 % | HEART RATE: 70 BPM | TEMPERATURE: 99.2 F | SYSTOLIC BLOOD PRESSURE: 138 MMHG | WEIGHT: 258 LBS | RESPIRATION RATE: 16 BRPM | BODY MASS INDEX: 34.99 KG/M2

## 2019-07-02 DIAGNOSIS — M25.562 ACUTE PAIN OF LEFT KNEE: ICD-10-CM

## 2019-07-02 PROCEDURE — 99214 OFFICE O/P EST MOD 30 MIN: CPT | Performed by: FAMILY MEDICINE

## 2019-07-02 PROCEDURE — 73564 X-RAY EXAM KNEE 4 OR MORE: CPT | Mod: TC,FY,LT | Performed by: FAMILY MEDICINE

## 2019-07-02 RX ORDER — MELOXICAM 15 MG/1
15 TABLET ORAL DAILY
Qty: 30 TAB | Refills: 0 | Status: SHIPPED | OUTPATIENT
Start: 2019-07-02 | End: 2019-10-11

## 2019-07-03 NOTE — PROGRESS NOTES
Subjective:   Jensen Donald is a 43 y.o. male who presents for Knee Pain (L knee tightness x2d )        Knee Pain   This is a new problem. The current episode started in the past 7 days. The problem occurs constantly. The problem has been gradually worsening. Associated symptoms include joint swelling. Pertinent negatives include no fever, numbness or weakness.     Review of Systems   Constitutional: Negative for fever.   Musculoskeletal: Positive for joint swelling.   Neurological: Negative for weakness and numbness.     Allergies   Allergen Reactions   • Diltiazem Hcl      Other reaction(s): Headache   • Lisinopril      cough   • Other Drug      Can't remember.  Few yrs ago. Anti-anxiety pill      Objective:   /86   Pulse 70   Temp 37.3 °C (99.2 °F) (Temporal)   Resp 16   Wt 117 kg (258 lb)   SpO2 98%   BMI 34.99 kg/m²   Physical Exam   Constitutional: He is oriented to person, place, and time. He appears well-developed and well-nourished. No distress.   HENT:   Head: Normocephalic and atraumatic.   Eyes: Pupils are equal, round, and reactive to light. Conjunctivae and EOM are normal.   Cardiovascular: Normal rate and regular rhythm.    No murmur heard.  Pulmonary/Chest: Effort normal and breath sounds normal. No respiratory distress.   Abdominal: Soft. He exhibits no distension. There is no tenderness.   Musculoskeletal:        Left knee: He exhibits decreased range of motion and effusion. He exhibits no LCL laxity, normal meniscus and no MCL laxity. Tenderness found. Medial joint line tenderness noted.   Neurological: He is alert and oriented to person, place, and time. He has normal reflexes. No sensory deficit.   Skin: Skin is warm and dry.   Psychiatric: He has a normal mood and affect.         Assessment/Plan:   1. Acute pain of left knee  - DX-KNEE COMPLETE 4+ LEFT; Future  - meloxicam (MOBIC) 15 MG tablet; Take 1 Tab by mouth every day.  Dispense: 30 Tab; Refill: 0    Differential  diagnosis, natural history, supportive care, and indications for immediate follow-up discussed.

## 2019-07-07 ASSESSMENT — ENCOUNTER SYMPTOMS
JOINT SWELLING: 1
NUMBNESS: 0
WEAKNESS: 0
FEVER: 0

## 2019-10-11 ENCOUNTER — OFFICE VISIT (OUTPATIENT)
Dept: URGENT CARE | Facility: PHYSICIAN GROUP | Age: 44
End: 2019-10-11
Payer: COMMERCIAL

## 2019-10-11 VITALS
TEMPERATURE: 99.7 F | DIASTOLIC BLOOD PRESSURE: 90 MMHG | RESPIRATION RATE: 16 BRPM | OXYGEN SATURATION: 98 % | HEART RATE: 77 BPM | BODY MASS INDEX: 34.31 KG/M2 | WEIGHT: 253 LBS | SYSTOLIC BLOOD PRESSURE: 132 MMHG

## 2019-10-11 DIAGNOSIS — J00 ACUTE RHINITIS: ICD-10-CM

## 2019-10-11 DIAGNOSIS — J06.9 URI WITH COUGH AND CONGESTION: ICD-10-CM

## 2019-10-11 DIAGNOSIS — R06.02 SOB (SHORTNESS OF BREATH): ICD-10-CM

## 2019-10-11 PROCEDURE — 99214 OFFICE O/P EST MOD 30 MIN: CPT | Performed by: PHYSICIAN ASSISTANT

## 2019-10-11 RX ORDER — AZITHROMYCIN 250 MG/1
TABLET, FILM COATED ORAL
Qty: 6 TAB | Refills: 0 | Status: SHIPPED | OUTPATIENT
Start: 2019-10-11 | End: 2020-12-02

## 2019-10-11 RX ORDER — FLUTICASONE PROPIONATE 50 MCG
1 SPRAY, SUSPENSION (ML) NASAL 2 TIMES DAILY
Qty: 1 BOTTLE | Refills: 0 | Status: SHIPPED | OUTPATIENT
Start: 2019-10-11 | End: 2019-11-07 | Stop reason: SDUPTHER

## 2019-10-11 RX ORDER — METHYLPREDNISOLONE 4 MG/1
TABLET ORAL
Qty: 21 TAB | Refills: 0 | Status: SHIPPED | OUTPATIENT
Start: 2019-10-11 | End: 2020-12-02

## 2019-10-11 NOTE — PROGRESS NOTES
Chief Complaint   Patient presents with   • Nasal Congestion     cough x1 week        HISTORY OF PRESENT ILLNESS: Patient is a 44 y.o. male who presents today for the following:    Patient comes in for evaluation of a one-week history of cough.  He reports associated shortness of breath, history of asthma as a kid, nasal congestion with green drainage, and subjective fever.  He has not taken any over-the-counter medication.  They are leaving town tomorrow.  Patient is also asking for refill of Flonase.    Patient Active Problem List    Diagnosis Date Noted   • Dysfunction of both eustachian tubes 11/26/2018   • Acute right-sided low back pain without sciatica 08/23/2018   • Left wrist pain 11/08/2017   • Chronic left shoulder pain 04/07/2017   • Rhinitis 04/07/2017   • Gastroesophageal reflux disease 04/07/2017   • Obesity (BMI 30-39.9) 04/07/2017   • Health care maintenance 04/07/2017   • Pharyngitis due to Streptococcus species 10/21/2016   • Globus sensation 10/21/2016   • Primary insomnia 10/21/2016   • Recurrent pain of right knee 10/21/2016   • HTN (hypertension) 12/19/2014   • abnormal electrocardiogram (ECG) (EKG) 12/19/2014   • Tonsil asymmetry 10/16/2014   • Snoring 10/16/2014   • Mild hyperlipidemia 10/16/2014   • Cyst of joint of ankle or foot 10/16/2014   • Ecchymosis 11/13/2013   • Dizziness 11/13/2013   • Nausea 11/13/2013   • Purpura (HCC) 11/13/2013       Allergies:Diltiazem hcl; Lisinopril; and Other drug    Current Outpatient Medications Ordered in Epic   Medication Sig Dispense Refill   • azithromycin (ZITHROMAX) 250 MG Tab Use as package directs 6 Tab 0   • methylPREDNISolone (MEDROL DOSEPAK) 4 MG Tablet Therapy Pack Use as package directs 21 Tab 0   • fluticasone (FLONASE) 50 MCG/ACT nasal spray Spray 1 Spray in nose 2 times a day. 1 Bottle 0     No current Epic-ordered facility-administered medications on file.        Past Medical History:   Diagnosis Date   • A-fib (HCC)     4 years ago, one  episode only, echo/stress test normal   • Geographic tongue    • Hypertension        Social History     Tobacco Use   • Smoking status: Former Smoker     Packs/day: 0.25     Years: 1.00     Pack years: 0.25     Types: Cigarettes   • Smokeless tobacco: Never Used   Substance Use Topics   • Alcohol use: Yes     Alcohol/week: 1.8 oz     Types: 1 Glasses of wine, 1 Cans of beer, 1 Shots of liquor per week     Comment: rarely   • Drug use: No       Family Status   Relation Name Status   • Mo       Family History   Problem Relation Age of Onset   • Diabetes Mother        Review of Systems:   Constitutional ROS: No unexpected change in weight, No weakness, No fatigue  Eye ROS: No recent significant change in vision, No eye pain, redness, discharge  Ear ROS: No drainage, No tinnitus or vertigo, No recent change in hearing  Mouth/Throat ROS: No teeth or gum problems, No bleeding gums, No tongue complaints  Neck ROS: No swollen glands, No significant pain in neck  Pulmonary ROS: Positive for shortness of breath.  Cardiovascular ROS: No diaphoresis, No edema, No palpitations  Gastrointestinal ROS: No change in bowel habits, No significant change in appetite, No nausea, vomiting, diarrhea, or constipation  Musculoskeletal/Extremities ROS: No peripheral edema, No pain, redness or swelling on the joints  Hematologic/Lymphatic ROS: Positive for fever.  Skin/Integumentary ROS: No edema, No evidence of rash, No itching      Exam:  /90   Pulse 77   Temp 37.6 °C (99.7 °F)   Resp 16   Wt 114.8 kg (253 lb)   SpO2 98%   General: Well developed, well nourished. No distress.  Eye: PERRL/EOMI; conjunctivae clear, lids normal.  ENMT: Lips without lesions, MMM. Oropharynx is clear. Bilateral TMs are within normal limits.  Pulmonary: Unlabored respiratory effort. Lungs clear to auscultation, no wheezes, no rhonchi.  Cardiovascular: Regular rate and rhythm without murmur.    Neurologic: Grossly nonfocal. No facial asymmetry  noted.  Lymph: No cervical lymphadenopathy noted.  Skin: Warm, dry, good turgor. No rashes in visible areas.   Psych: Normal mood. Alert and oriented x3. Judgment and insight is normal.    Assessment/Plan:  Discussed likely viral etiology.  Recommend starting steroids to see if this helps eliminate some of the shortness of breath.  Contingent antibiotics provided should symptoms worsen after starting steroids.  Use all medication as directed.  Follow-up for worsening or persistent symptoms.  1. URI with cough and congestion  azithromycin (ZITHROMAX) 250 MG Tab   2. SOB (shortness of breath)  methylPREDNISolone (MEDROL DOSEPAK) 4 MG Tablet Therapy Pack   3. Acute rhinitis  fluticasone (FLONASE) 50 MCG/ACT nasal spray

## 2019-10-14 DIAGNOSIS — M54.50 ACUTE RIGHT-SIDED LOW BACK PAIN WITHOUT SCIATICA: ICD-10-CM

## 2019-10-16 NOTE — TELEPHONE ENCOUNTER
Was the patient seen in the last year in this department? Yes    Does patient have an active prescription for medications requested? No     Received Request Via: Pharmacy      Pt met protocol?: No   Pt last ov 11/2018 ,med was ast d/c by pcp 11/26/2018

## 2019-10-17 NOTE — TELEPHONE ENCOUNTER
Phone Number Called: [phone] (home)       Call outcome: left message for patient to call back regarding message below    Message: left voice message asking patient to call [phone]-need to verify if patient is taking medication.

## 2019-11-05 RX ORDER — NAPROXEN 500 MG/1
TABLET ORAL
Refills: 0 | OUTPATIENT
Start: 2019-11-05

## 2019-11-07 DIAGNOSIS — J00 ACUTE RHINITIS: ICD-10-CM

## 2019-11-07 RX ORDER — FLUTICASONE PROPIONATE 50 MCG
1 SPRAY, SUSPENSION (ML) NASAL 2 TIMES DAILY
Qty: 3 BOTTLE | Refills: 1 | Status: SHIPPED | OUTPATIENT
Start: 2019-11-07 | End: 2023-03-06

## 2019-11-07 NOTE — TELEPHONE ENCOUNTER
*PT NEEDS TO MAKE AN APPT*  Was the patient seen in the last year in this department? Yes    Does patient have an active prescription for medications requested? No     Received Request Via: Pharmacy      Pt met protocol?: Yes    LAST OV 11/26/2018

## 2020-12-02 ENCOUNTER — OFFICE VISIT (OUTPATIENT)
Dept: URGENT CARE | Facility: PHYSICIAN GROUP | Age: 45
End: 2020-12-02
Payer: COMMERCIAL

## 2020-12-02 VITALS
TEMPERATURE: 98.2 F | WEIGHT: 261.2 LBS | OXYGEN SATURATION: 96 % | RESPIRATION RATE: 16 BRPM | DIASTOLIC BLOOD PRESSURE: 82 MMHG | HEIGHT: 72 IN | BODY MASS INDEX: 35.38 KG/M2 | SYSTOLIC BLOOD PRESSURE: 118 MMHG | HEART RATE: 93 BPM

## 2020-12-02 DIAGNOSIS — R10.13 EPIGASTRIC PAIN: ICD-10-CM

## 2020-12-02 DIAGNOSIS — K21.9 GASTROESOPHAGEAL REFLUX DISEASE, UNSPECIFIED WHETHER ESOPHAGITIS PRESENT: ICD-10-CM

## 2020-12-02 PROCEDURE — 99214 OFFICE O/P EST MOD 30 MIN: CPT | Performed by: FAMILY MEDICINE

## 2020-12-02 RX ORDER — PANTOPRAZOLE SODIUM 40 MG/1
TABLET, DELAYED RELEASE ORAL
Qty: 30 TAB | Refills: 2 | Status: SHIPPED | OUTPATIENT
Start: 2020-12-02 | End: 2021-11-03

## 2020-12-02 NOTE — PROGRESS NOTES
Chief Complaint:    Chief Complaint   Patient presents with   • Heartburn     pain in center of chest, pt states that he has been struggling with really bad acid reflux that has turn into chest pain. x2 years        History of Present Illness:    This is a new problem. Over the past week, he has been dealing with worsening epigastric pain and feeling like GERD is worsening. He has long history of GERD issues in the past. Nexium and Prilosec have been sub-optimal in the past. Recently he has been using OTC TUMs and antacid with some temporary but overall sub-optimal help. He would like to see a Gastroenterologist. He can work out without any chest pain, which he did this AM.      Review of Systems:    Constitutional: Negative for fever, chills, and diaphoresis.   Eyes: Negative for change in vision, photophobia, pain, redness, and discharge.  ENT: Negative for ear pain, ear discharge, hearing loss, tinnitus, nasal congestion, nosebleeds, and sore throat.    Respiratory: Negative for cough, hemoptysis, sputum production, shortness of breath, wheezing, and stridor.    Cardiovascular: Negative for palpitations, orthopnea, claudication, leg swelling, and PND.   Gastrointestinal: See HPI.   Genitourinary: Negative for dysuria, urinary urgency, urinary frequency, hematuria, and flank pain.   Musculoskeletal: Negative for myalgias, joint pain, neck pain, and back pain.   Skin: Negative for rash and itching.   Neurological: Negative for dizziness, tingling, tremors, sensory change, speech change, focal weakness, seizures, loss of consciousness, and headaches.   Endo: Negative for polydipsia.   Heme: Does not bruise/bleed easily.   Psychiatric/Behavioral: Negative for depression, suicidal ideas, hallucinations, memory loss and substance abuse. The patient is not nervous/anxious and does not have insomnia.        Past Medical History:    Past Medical History:   Diagnosis Date   • A-fib (HCC)     4 years ago, one episode only,  echo/stress test normal   • Geographic tongue    • Hypertension      Past Surgical History:    History reviewed. No pertinent surgical history.    Social History:    Social History     Socioeconomic History   • Marital status:      Spouse name: Not on file   • Number of children: Not on file   • Years of education: Not on file   • Highest education level: Not on file   Occupational History   • Not on file   Social Needs   • Financial resource strain: Not on file   • Food insecurity     Worry: Not on file     Inability: Not on file   • Transportation needs     Medical: Not on file     Non-medical: Not on file   Tobacco Use   • Smoking status: Former Smoker     Packs/day: 0.25     Years: 1.00     Pack years: 0.25     Types: Cigarettes   • Smokeless tobacco: Never Used   Substance and Sexual Activity   • Alcohol use: Yes     Alcohol/week: 1.8 oz     Types: 1 Glasses of wine, 1 Cans of beer, 1 Shots of liquor per week     Comment: rarely   • Drug use: No   • Sexual activity: Yes     Partners: Female     Comment: wife, Fern   Lifestyle   • Physical activity     Days per week: Not on file     Minutes per session: Not on file   • Stress: Not on file   Relationships   • Social connections     Talks on phone: Not on file     Gets together: Not on file     Attends [employer] service: Not on file     Active member of club or organization: Not on file     Attends meetings of clubs or organizations: Not on file     Relationship status: Not on file   • Intimate partner violence     Fear of current or ex partner: Not on file     Emotionally abused: Not on file     Physically abused: Not on file     Forced sexual activity: Not on file   Other Topics Concern   • Not on file   Social History Narrative   • Not on file     Family History:    Family History   Problem Relation Age of Onset   • Diabetes Mother      Medications:    Current Outpatient Medications on File Prior to Visit   Medication Sig Dispense Refill   • fluticasone  (FLONASE) 50 MCG/ACT nasal spray Spray 1 Spray in nose 2 times a day. 3 Bottle 1     No current facility-administered medications on file prior to visit.      Allergies:    Allergies   Allergen Reactions   • Diltiazem Hcl      Other reaction(s): Headache   • Lisinopril      cough   • Other Drug      Can't remember.  Few yrs ago. Anti-anxiety pill       Vitals:    Vitals:    12/02/20 1055   BP: 118/82   Pulse: 93   Resp: 16   Temp: 36.8 °C (98.2 °F)   TempSrc: Temporal   SpO2: 96%   Weight: 118.5 kg (261 lb 3.2 oz)   Height: 1.829 m (6')       Physical Exam:    Constitutional: Vital signs reviewed. Appears well-developed and well-nourished. No acute distress.   Eyes: Sclera white, conjunctivae clear.   ENT: External ears normal. Hearing normal. Nasal mucosa pink. Lips/teeth are normal. Oral mucosa pink and moist. Posterior pharynx: WNL.  Neck: Neck supple.   Cardiovascular: Regular rate and rhythm. No murmur.  Pulmonary/Chest: Respirations non-labored. Clear to auscultation bilaterally.  Abdomen: Bowel sounds are normal active. Soft, non-distended, and non-tender to palpation.  Musculoskeletal: Normal gait. Normal range of motion. No muscular atrophy or weakness.  Neurological: Alert and oriented to person, place, and time. Muscle tone normal. Coordination normal.   Skin: No rashes or lesions. Warm, dry, normal turgor.  Psychiatric: Normal mood and affect. Behavior is normal. Judgment and thought content normal.       Assessment / Plan:    1. Epigastric pain  - REFERRAL TO GASTROENTEROLOGY  - pantoprazole (PROTONIX) 40 MG Tablet Delayed Response; 1 TAB BY MOUTH ONCE A DAY TO REDUCE ACID IN THE STOMACH.  Dispense: 30 Tab; Refill: 2    2. Gastroesophageal reflux disease, unspecified whether esophagitis present  - REFERRAL TO GASTROENTEROLOGY  - pantoprazole (PROTONIX) 40 MG Tablet Delayed Response; 1 TAB BY MOUTH ONCE A DAY TO REDUCE ACID IN THE STOMACH.  Dispense: 30 Tab; Refill: 2      Discussed with him DDX,  management options, and risks, benefits, and alternatives to treatment plan agreed upon.    Agreeable to GI cocktail given in clinic. This helped some.    Likely GI source as cause of symptoms.    Agreeable to medication prescribed and referral to Gastroenterology ordered.    He will return to urgent care if needed while waiting to see Gastroenterologist.

## 2021-01-27 ENCOUNTER — TELEMEDICINE (OUTPATIENT)
Dept: MEDICAL GROUP | Facility: PHYSICIAN GROUP | Age: 46
End: 2021-01-27
Payer: COMMERCIAL

## 2021-01-27 VITALS — HEIGHT: 72 IN | WEIGHT: 235 LBS | BODY MASS INDEX: 31.83 KG/M2

## 2021-01-27 DIAGNOSIS — M25.561 PAIN IN BOTH KNEES, UNSPECIFIED CHRONICITY: ICD-10-CM

## 2021-01-27 DIAGNOSIS — I10 ESSENTIAL HYPERTENSION: ICD-10-CM

## 2021-01-27 DIAGNOSIS — E78.5 MILD HYPERLIPIDEMIA: ICD-10-CM

## 2021-01-27 DIAGNOSIS — M25.562 PAIN IN BOTH KNEES, UNSPECIFIED CHRONICITY: ICD-10-CM

## 2021-01-27 DIAGNOSIS — M79.89 SOFT TISSUE MASS: ICD-10-CM

## 2021-01-27 PROCEDURE — 99214 OFFICE O/P EST MOD 30 MIN: CPT | Mod: 95,CR | Performed by: NURSE PRACTITIONER

## 2021-01-27 ASSESSMENT — PATIENT HEALTH QUESTIONNAIRE - PHQ9: CLINICAL INTERPRETATION OF PHQ2 SCORE: 0

## 2021-01-27 NOTE — PROGRESS NOTES
Virtual Visit: Established Patient   This visit was conducted via Zoom using secure and encrypted videoconferencing technology. The patient was in a private location in the state of [place].    The patient's identity was confirmed and verbal consent was obtained for this virtual visit.    Subjective:   CC:   Chief Complaint   Patient presents with   • Knee Pain     bilateral    • Abdominal Pain     LRQ abdomen        Jensen Donald is a 45 y.o. 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 which seems to worsen his pain  He does admit to me he has been using CBD cream which seems to help  He declines a referral to PT but would like x-rays of both his knees, this is reasonable  We discussed the importance of ongoing abortive measures including ice and/or heat, elevation, compression using Ace wrap strenuously  Patient will be notified of results and further actions if needed    HTN (hypertension)  This is a chronic stable condition  Unable to measure blood pressure today as this is a virtual visit  He is well past due for labs, have not been done in over a year  Routine fasting labs ordered    Mild hyperlipidemia  The ASCVD Risk score (Fonda DC Jr, et al., 2013) failed to calculate.  Not currently on statin  Past due for labs  Discussed the importance of ongoing healthy lifestyle modifications    Soft tissue mass  Patient has noticed multiple soft tissue masses in his abdomen in the right lower quadrant  They are tender to touch, states they are soft and mobile  Discussed various possible causes including lipoma versus other unknown causes  He does agree to an ultrasound this has been ordered  Denies associated symptoms with this      ROS   Denies any recent fevers or chills. No  nausea or vomiting. No chest pains or shortness of breath.   Musculoskeletal ROS: Positive per HPI    Allergies   Allergen Reactions   • Diltiazem Hcl      Other reaction(s): Headache   • Lisinopril      cough   • Other Drug      Can't remember.  Few yrs ago. Anti-anxiety pill       Current medicines (including changes today)  Current Outpatient Medications   Medication Sig Dispense Refill   • pantoprazole (PROTONIX) 40 MG Tablet Delayed Response 1 TAB BY MOUTH ONCE A DAY TO REDUCE ACID IN THE STOMACH. (Patient taking differently: 2 times a day. 1 TAB BY MOUTH ONCE A DAY TO REDUCE ACID IN THE STOMACH.) 30 Tab 2   • fluticasone (FLONASE) 50 MCG/ACT nasal spray Spray 1 Spray in nose 2 times a day. 3 Bottle 1     No current facility-administered medications for this visit.        Patient Active Problem List    Diagnosis Date Noted   • Soft tissue mass 01/27/2021   • Dysfunction of both eustachian tubes 11/26/2018   • Acute right-sided low back pain without sciatica 08/23/2018   • Left wrist pain 11/08/2017   • Chronic left shoulder pain 04/07/2017   • Rhinitis 04/07/2017   • Gastroesophageal reflux disease 04/07/2017   • Obesity (BMI 30-39.9) 04/07/2017   • Health care maintenance 04/07/2017   • Pharyngitis due to Streptococcus species 10/21/2016   • Globus sensation 10/21/2016   • Primary insomnia 10/21/2016   • Pain in both knees 10/21/2016   • HTN (hypertension) 12/19/2014   • abnormal electrocardiogram (ECG) (EKG) 12/19/2014   • Tonsil asymmetry 10/16/2014   • Snoring 10/16/2014   • Mild hyperlipidemia 10/16/2014   • Cyst of joint of ankle or foot 10/16/2014   • Ecchymosis 11/13/2013   • Dizziness 11/13/2013   • Nausea 11/13/2013   • Purpura (HCC) 11/13/2013       Family History   Problem Relation Age of Onset   • Diabetes Mother        He  has a past medical history of A-fib (HCC), Geographic tongue, and Hypertension.  He  has no past surgical history on file.       Objective:   Ht 1.829 m (6')   Wt 106.6 kg (235  lb)   BMI 31.87 kg/m²     Physical Exam:  Constitutional: Alert, no distress, well-groomed.  Skin: No rashes in visible areas.  Eye: Round. Conjunctiva clear, lids normal. No icterus.   ENMT: Lips pink without lesions, good dentition, moist mucous membranes. Phonation normal.  Neck: No masses, no thyromegaly. Moves freely without pain.  Respiratory: Unlabored respiratory effort, no cough or audible wheeze  Psych: Alert and oriented x3, normal affect and mood.       Assessment and Plan:   The following treatment plan was discussed:     As mentioned above:  We will get bilateral knee x-rays, he will be notified of results and further actions if needed.  Also get abdominal ultrasound to further evaluate soft tissue mass in the abdomen.  Discussed importance of supportive measures in the setting of his knee pain including over-the-counter analgesics, rest, elevation compression if needed.  Patient due for routine fasting labs, lipid orderedDictation #1  MRN:[id number]  CSN:[id number]      1. Pain in both knees, unspecified chronicity  - DX-KNEE 2- LEFT; Future  - DX-KNEE 2- RIGHT; Future    2. Soft tissue mass  - US-ABDOMEN LTD (SOFT TISSUE); Future    3. Essential hypertension  - CBC WITH DIFFERENTIAL; Future  - Comp Metabolic Panel; Future  - Lipid Profile; Future    4. Mild hyperlipidemia  - Comp Metabolic Panel; Future  - Lipid Profile; Future        Follow-up: Return if symptoms worsen or fail to improve, for Follow-up.

## 2021-01-28 NOTE — ASSESSMENT & PLAN NOTE
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

## 2021-01-28 NOTE — ASSESSMENT & PLAN NOTE
This is a chronic stable condition  Unable to measure blood pressure today as this is a virtual visit  He is well past due for labs, have not been done in over a year  Routine fasting labs ordered

## 2021-01-28 NOTE — ASSESSMENT & PLAN NOTE
Patient has noticed multiple soft tissue masses in his abdomen in the right lower quadrant  They are tender to touch, states they are soft and mobile  Discussed various possible causes including lipoma versus other unknown causes  He does agree to an ultrasound this has been ordered  Denies associated symptoms with this

## 2021-01-28 NOTE — ASSESSMENT & PLAN NOTE
The ASCVD Risk score (Krystaljenni RODRIGUEZ Jr, et al., 2013) failed to calculate.  Not currently on statin  Past due for labs  Discussed the importance of ongoing healthy lifestyle modifications

## 2021-02-04 ENCOUNTER — HOSPITAL ENCOUNTER (OUTPATIENT)
Dept: RADIOLOGY | Facility: MEDICAL CENTER | Age: 46
End: 2021-02-04
Attending: NURSE PRACTITIONER
Payer: COMMERCIAL

## 2021-02-04 DIAGNOSIS — M25.561 PAIN IN BOTH KNEES, UNSPECIFIED CHRONICITY: ICD-10-CM

## 2021-02-04 DIAGNOSIS — M79.89 SOFT TISSUE MASS: ICD-10-CM

## 2021-02-04 DIAGNOSIS — M25.562 PAIN IN BOTH KNEES, UNSPECIFIED CHRONICITY: ICD-10-CM

## 2021-02-04 PROCEDURE — 73560 X-RAY EXAM OF KNEE 1 OR 2: CPT | Mod: RT

## 2021-02-04 PROCEDURE — 76705 ECHO EXAM OF ABDOMEN: CPT

## 2021-02-04 PROCEDURE — 73560 X-RAY EXAM OF KNEE 1 OR 2: CPT | Mod: LT

## 2021-02-08 ENCOUNTER — HOSPITAL ENCOUNTER (OUTPATIENT)
Dept: LAB | Facility: MEDICAL CENTER | Age: 46
End: 2021-02-08
Attending: NURSE PRACTITIONER
Payer: COMMERCIAL

## 2021-02-08 DIAGNOSIS — I10 ESSENTIAL HYPERTENSION: ICD-10-CM

## 2021-02-08 DIAGNOSIS — E78.5 MILD HYPERLIPIDEMIA: ICD-10-CM

## 2021-02-08 LAB
ALBUMIN SERPL BCP-MCNC: 4.3 G/DL (ref 3.2–4.9)
ALBUMIN/GLOB SERPL: 1.7 G/DL
ALP SERPL-CCNC: 80 U/L (ref 30–99)
ALT SERPL-CCNC: 15 U/L (ref 2–50)
ANION GAP SERPL CALC-SCNC: 10 MMOL/L (ref 7–16)
AST SERPL-CCNC: 17 U/L (ref 12–45)
BASOPHILS # BLD AUTO: 1 % (ref 0–1.8)
BASOPHILS # BLD: 0.04 K/UL (ref 0–0.12)
BILIRUB SERPL-MCNC: 0.6 MG/DL (ref 0.1–1.5)
BUN SERPL-MCNC: 21 MG/DL (ref 8–22)
CALCIUM SERPL-MCNC: 9.3 MG/DL (ref 8.5–10.5)
CHLORIDE SERPL-SCNC: 105 MMOL/L (ref 96–112)
CHOLEST SERPL-MCNC: 172 MG/DL (ref 100–199)
CO2 SERPL-SCNC: 26 MMOL/L (ref 20–33)
CREAT SERPL-MCNC: 1.09 MG/DL (ref 0.5–1.4)
EOSINOPHIL # BLD AUTO: 0.2 K/UL (ref 0–0.51)
EOSINOPHIL NFR BLD: 5.2 % (ref 0–6.9)
ERYTHROCYTE [DISTWIDTH] IN BLOOD BY AUTOMATED COUNT: 39.6 FL (ref 35.9–50)
FASTING STATUS PATIENT QL REPORTED: NORMAL
GLOBULIN SER CALC-MCNC: 2.6 G/DL (ref 1.9–3.5)
GLUCOSE SERPL-MCNC: 95 MG/DL (ref 65–99)
HCT VFR BLD AUTO: 43.4 % (ref 42–52)
HDLC SERPL-MCNC: 40 MG/DL
HGB BLD-MCNC: 14.9 G/DL (ref 14–18)
IMM GRANULOCYTES # BLD AUTO: 0 K/UL (ref 0–0.11)
IMM GRANULOCYTES NFR BLD AUTO: 0 % (ref 0–0.9)
LDLC SERPL CALC-MCNC: 112 MG/DL
LYMPHOCYTES # BLD AUTO: 1.63 K/UL (ref 1–4.8)
LYMPHOCYTES NFR BLD: 42.8 % (ref 22–41)
MCH RBC QN AUTO: 30.2 PG (ref 27–33)
MCHC RBC AUTO-ENTMCNC: 34.3 G/DL (ref 33.7–35.3)
MCV RBC AUTO: 87.9 FL (ref 81.4–97.8)
MONOCYTES # BLD AUTO: 0.3 K/UL (ref 0–0.85)
MONOCYTES NFR BLD AUTO: 7.9 % (ref 0–13.4)
NEUTROPHILS # BLD AUTO: 1.64 K/UL (ref 1.82–7.42)
NEUTROPHILS NFR BLD: 43.1 % (ref 44–72)
NRBC # BLD AUTO: 0 K/UL
NRBC BLD-RTO: 0 /100 WBC
PLATELET # BLD AUTO: 219 K/UL (ref 164–446)
PMV BLD AUTO: 11.5 FL (ref 9–12.9)
POTASSIUM SERPL-SCNC: 4.2 MMOL/L (ref 3.6–5.5)
PROT SERPL-MCNC: 6.9 G/DL (ref 6–8.2)
RBC # BLD AUTO: 4.94 M/UL (ref 4.7–6.1)
SODIUM SERPL-SCNC: 141 MMOL/L (ref 135–145)
TRIGL SERPL-MCNC: 99 MG/DL (ref 0–149)
WBC # BLD AUTO: 3.8 K/UL (ref 4.8–10.8)

## 2021-02-08 PROCEDURE — 80061 LIPID PANEL: CPT

## 2021-02-08 PROCEDURE — 80053 COMPREHEN METABOLIC PANEL: CPT

## 2021-02-08 PROCEDURE — 85025 COMPLETE CBC W/AUTO DIFF WBC: CPT

## 2021-02-08 PROCEDURE — 36415 COLL VENOUS BLD VENIPUNCTURE: CPT

## 2021-06-22 ENCOUNTER — APPOINTMENT (OUTPATIENT)
Dept: RADIOLOGY | Facility: IMAGING CENTER | Age: 46
End: 2021-06-22
Attending: FAMILY MEDICINE
Payer: COMMERCIAL

## 2021-06-22 ENCOUNTER — OFFICE VISIT (OUTPATIENT)
Dept: URGENT CARE | Facility: PHYSICIAN GROUP | Age: 46
End: 2021-06-22
Payer: COMMERCIAL

## 2021-06-22 VITALS
OXYGEN SATURATION: 97 % | BODY MASS INDEX: 33.05 KG/M2 | RESPIRATION RATE: 14 BRPM | SYSTOLIC BLOOD PRESSURE: 144 MMHG | HEART RATE: 74 BPM | HEIGHT: 72 IN | TEMPERATURE: 98.8 F | DIASTOLIC BLOOD PRESSURE: 82 MMHG | WEIGHT: 244 LBS

## 2021-06-22 DIAGNOSIS — S29.9XXA TRAUMA OF CHEST, INITIAL ENCOUNTER: ICD-10-CM

## 2021-06-22 PROCEDURE — 99214 OFFICE O/P EST MOD 30 MIN: CPT | Performed by: FAMILY MEDICINE

## 2021-06-22 PROCEDURE — 71046 X-RAY EXAM CHEST 2 VIEWS: CPT | Mod: TC,FY | Performed by: FAMILY MEDICINE

## 2021-06-22 RX ORDER — HYDROCODONE BITARTRATE AND ACETAMINOPHEN 5; 325 MG/1; MG/1
TABLET ORAL
Qty: 15 TABLET | Refills: 0 | Status: SHIPPED | OUTPATIENT
Start: 2021-06-22 | End: 2021-06-26

## 2021-06-22 RX ORDER — KETOROLAC TROMETHAMINE 30 MG/ML
60 INJECTION, SOLUTION INTRAMUSCULAR; INTRAVENOUS ONCE
Status: COMPLETED | OUTPATIENT
Start: 2021-06-22 | End: 2021-06-22

## 2021-06-22 RX ADMIN — KETOROLAC TROMETHAMINE 60 MG: 30 INJECTION, SOLUTION INTRAMUSCULAR; INTRAVENOUS at 10:00

## 2021-06-22 ASSESSMENT — FIBROSIS 4 INDEX: FIB4 SCORE: 0.9

## 2021-06-22 NOTE — PROGRESS NOTES
Chief Complaint:    Chief Complaint   Patient presents with   • Nausea     x45 min, dropped weight on his chest at the gym   • Chest Pressure     x45 min       History of Present Illness:    Wife present. Patient was at gym this AM, was doing 205 lb bench press, accidentally dropped the weight onto his lower chest, causing pain. Initially had some nausea, but currently not nauseated.      Past Medical History:    Past Medical History:   Diagnosis Date   • A-fib (HCC)     4 years ago, one episode only, echo/stress test normal   • Geographic tongue    • Hypertension      Past Surgical History:    No past surgical history on file.    Social History:    Social History     Socioeconomic History   • Marital status:      Spouse name: Not on file   • Number of children: Not on file   • Years of education: Not on file   • Highest education level: Not on file   Occupational History   • Not on file   Tobacco Use   • Smoking status: Never Smoker   • Smokeless tobacco: Never Used   Substance and Sexual Activity   • Alcohol use: Yes     Alcohol/week: 1.8 oz     Types: 1 Glasses of wine, 1 Cans of beer, 1 Shots of liquor per week     Comment: rarely   • Drug use: No   • Sexual activity: Yes     Partners: Female     Comment: wife, Fern   Other Topics Concern   • Not on file   Social History Narrative   • Not on file     Social Determinants of Health     Financial Resource Strain:    • Difficulty of Paying Living Expenses:    Food Insecurity:    • Worried About Running Out of Food in the Last Year:    • Ran Out of Food in the Last Year:    Transportation Needs:    • Lack of Transportation (Medical):    • Lack of Transportation (Non-Medical):    Physical Activity:    • Days of Exercise per Week:    • Minutes of Exercise per Session:    Stress:    • Feeling of Stress :    Social Connections:    • Frequency of Communication with Friends and Family:    • Frequency of Social Gatherings with Friends and Family:    • Attends  [employer] Services:    • Active Member of Clubs or Organizations:    • Attends Club or Organization Meetings:    • Marital Status:    Intimate Partner Violence:    • Fear of Current or Ex-Partner:    • Emotionally Abused:    • Physically Abused:    • Sexually Abused:      Family History:    Family History   Problem Relation Age of Onset   • Diabetes Mother      Medications:    Current Outpatient Medications on File Prior to Visit   Medication Sig Dispense Refill   • pantoprazole (PROTONIX) 40 MG Tablet Delayed Response 1 TAB BY MOUTH ONCE A DAY TO REDUCE ACID IN THE STOMACH. (Patient taking differently: 2 times a day. 1 TAB BY MOUTH ONCE A DAY TO REDUCE ACID IN THE STOMACH.) 30 Tab 2   • fluticasone (FLONASE) 50 MCG/ACT nasal spray Spray 1 Spray in nose 2 times a day. 3 Bottle 1     No current facility-administered medications on file prior to visit.     Allergies:    Allergies   Allergen Reactions   • Diltiazem Hcl      Other reaction(s): Headache   • Lisinopril      cough   • Other Drug      Can't remember.  Few yrs ago. Anti-anxiety pill       Vitals:    Vitals:    06/22/21 0943   BP: 144/82   Pulse: 74   Resp: 14   Temp: 37.1 °C (98.8 °F)   SpO2: 97%   Weight: 111 kg (244 lb)   Height: 1.829 m (6')       Physical Exam:    Constitutional: Vital signs reviewed. Appears well-developed and well-nourished. In pain due to lower chest.  Eyes: Sclera white, conjunctivae clear.   ENT: External ears normal. Nasal mucosa pink.   Cardiovascular: Regular rate and rhythm. No murmur.  Pulmonary/Chest: Respirations non-labored. Clear to auscultation bilaterally.  Musculoskeletal: Most tender in the right lower costochondral junction to palpation and with movements. No bruising in this area.  Neurological: Alert and oriented to person, place, and time. Muscle tone normal. Coordination normal.   Skin: No rashes or lesions. Warm, dry, normal turgor.  Psychiatric: Normal mood and affect. Behavior is normal. Judgment and thought  content normal.       Diagnostics:    DX-CHEST-2 VIEWS  Order: [id number]  Status:  Final result   Visible to patient:  No (scheduled for 6/23/2021  8:45 AM) Next appt:  None Dx:  Trauma of chest, initial encounter   0 Result Notes  Details    Reading Physician Reading Date Result Priority   Anel Tim M.D.  [phone] 6/22/2021 Urgent Care      Narrative & Impression     6/22/2021 10:25 AM     HISTORY/REASON FOR EXAM:  Pain in the lower chest after dropping 200 pound weight while bench pressing earlier today.        TECHNIQUE/EXAM DESCRIPTION AND NUMBER OF VIEWS:  Two views of the chest.     COMPARISON:  12/30/2015     FINDINGS:        The mediastinal and cardiac silhouette is unremarkable.     The pulmonary vascularity is within normal limits.     The lung parenchyma is clear.     There is no significant pleural effusion.     There is no visible pneumothorax.     There is no acute fracture. There is calcification in the left subcoracoid region again seen suggestive of subcoracoid bursitis.     IMPRESSION:     1.  Unremarkable two view chest.  2.  There is no displaced fracture.        I personally reviewed the images. Images and Rad report reviewed with them and copy of report to them.    EKG x 2: Sinus rhythm 58. LVH (also on EKG 12/30/15 and he reports he had normal heart ECHO).    EKG reviewed with them and one of them given to him.      Assessment / Plan:    1. Trauma of chest, initial encounter  - ketorolac (TORADOL) injection 60 mg  - DX-CHEST-2 VIEWS; Future  - EKG  - HYDROcodone-acetaminophen (NORCO) 5-325 MG Tab per tablet; 1 TAB BY MOUTH EVERY 6 HOURS ONLY IF NEEDED FOR PAIN FOR UP TO 4 DAYS. MAY CAUSE DROWSINESS.  Dispense: 15 tablet; Refill: 0  - Consent for Opiate Prescription      Work note given - excuse for 6/22 and 6/23/21.     Discussed with them DDX, management options, and risks, benefits, and alternatives to treatment plan agreed upon.    Discussed limitation of x-ray imaging here. He  looks stable if he does not move and by end of visit, he looked more comfortable than initial presentation. I do not strongly feel he needs CT imaging (we do not have here) at this time.    Warned of delayed bruising and swelling.    Recommended relative rest.    May take over-the-counter Ibuprofen (Motrin or Advil) OR Naproxen (Aleve) as needed for pain and swelling for anti-inflammatory effect starting tomorrow.    Agreeable to medications given and prescribed.  report checked - last Rx was Promethazine-Codeine syrup #240 ml on 2/21/19. No other Rx x 3 years.    In my professional judgment, after considering each of the factors set forth in , the CS is medically necessary and appropriate.    Discussed expected course of duration, time for improvement, and to seek follow-up in Emergency Room, urgent care, or with PCP if getting worse at any time or not improving within expected time frame.

## 2021-06-22 NOTE — LETTER
June 22, 2021         Patient: Jensen Donald   YOB: 1975   Date of Visit: 6/22/2021           To Whom it May Concern:    Jensen Donald was seen in my clinic on 6/22/2021.     Please excuse from work for 6/22 and 6/23/21 due to medical condition.    If you have any questions or concerns, please don't hesitate to call.        Sincerely,           Junior Antony M.D.  Electronically Signed

## 2021-11-03 ENCOUNTER — OFFICE VISIT (OUTPATIENT)
Dept: URGENT CARE | Facility: PHYSICIAN GROUP | Age: 46
End: 2021-11-03
Payer: COMMERCIAL

## 2021-11-03 ENCOUNTER — HOSPITAL ENCOUNTER (OUTPATIENT)
Facility: MEDICAL CENTER | Age: 46
End: 2021-11-03
Attending: FAMILY MEDICINE
Payer: COMMERCIAL

## 2021-11-03 VITALS
HEART RATE: 72 BPM | DIASTOLIC BLOOD PRESSURE: 76 MMHG | SYSTOLIC BLOOD PRESSURE: 142 MMHG | OXYGEN SATURATION: 98 % | RESPIRATION RATE: 16 BRPM | HEIGHT: 72 IN | WEIGHT: 245 LBS | TEMPERATURE: 98 F | BODY MASS INDEX: 33.18 KG/M2

## 2021-11-03 DIAGNOSIS — R03.0 ELEVATED BLOOD PRESSURE READING: ICD-10-CM

## 2021-11-03 DIAGNOSIS — Z03.818 ENCOUNTER FOR PATIENT CONCERN ABOUT EXPOSURE TO INFECTIOUS ORGANISM: ICD-10-CM

## 2021-11-03 PROCEDURE — 99213 OFFICE O/P EST LOW 20 MIN: CPT | Mod: CS | Performed by: FAMILY MEDICINE

## 2021-11-03 PROCEDURE — U0005 INFEC AGEN DETEC AMPLI PROBE: HCPCS

## 2021-11-03 PROCEDURE — U0003 INFECTIOUS AGENT DETECTION BY NUCLEIC ACID (DNA OR RNA); SEVERE ACUTE RESPIRATORY SYNDROME CORONAVIRUS 2 (SARS-COV-2) (CORONAVIRUS DISEASE [COVID-19]), AMPLIFIED PROBE TECHNIQUE, MAKING USE OF HIGH THROUGHPUT TECHNOLOGIES AS DESCRIBED BY CMS-2020-01-R: HCPCS

## 2021-11-03 RX ORDER — PANTOPRAZOLE SODIUM 20 MG/1
20 TABLET, DELAYED RELEASE ORAL DAILY
COMMUNITY
Start: 2021-10-28 | End: 2023-03-06

## 2021-11-03 ASSESSMENT — FIBROSIS 4 INDEX: FIB4 SCORE: 0.92

## 2021-11-04 DIAGNOSIS — Z03.818 ENCOUNTER FOR PATIENT CONCERN ABOUT EXPOSURE TO INFECTIOUS ORGANISM: ICD-10-CM

## 2021-11-04 LAB
COVID ORDER STATUS COVID19: NORMAL
SARS-COV-2 RNA RESP QL NAA+PROBE: NOTDETECTED
SPECIMEN SOURCE: NORMAL

## 2021-11-04 NOTE — PROGRESS NOTES
Subjective:      46 y.o. male presents to urgent care for cold symptoms for the last couple of days. He is experiencing increased facial pressure, body aches, headaches, and diarrhea. No fevers, cough, sore throat, or vomiting. He denies any tobacco product use. He had asthma as a child, but he no longer needs any medication for this. He is fully vaccinated against COVID. No known sick contacts.     Blood pressure is elevated today in Urgent Care. He does have a history of hypertension, which is managed with lifestyle. He denies any chest pain, palpitations, or shortness of breath.     He denies any other questions or concerns at this time.    Current problem list, medication, and past medical/surgical history were reviewed in Epic.    ROS  See HPI     Objective:      /76   Pulse 72   Temp 36.7 °C (98 °F)   Resp 16   Ht 1.829 m (6')   Wt 111 kg (245 lb)   SpO2 98%   BMI 33.23 kg/m²     Physical Exam  Constitutional:       General: He is not in acute distress.     Appearance: He is not diaphoretic.   HENT:      Right Ear: Tympanic membrane, ear canal and external ear normal.      Left Ear: Tympanic membrane, ear canal and external ear normal.      Nose:      Right Sinus: No maxillary sinus tenderness or frontal sinus tenderness.      Left Sinus: No maxillary sinus tenderness or frontal sinus tenderness.      Mouth/Throat:      Palate: No lesions.      Pharynx: Uvula midline. Posterior oropharyngeal erythema present.      Tonsils: No tonsillar exudate.   Cardiovascular:      Rate and Rhythm: Normal rate and regular rhythm.      Heart sounds: Normal heart sounds.   Pulmonary:      Effort: Pulmonary effort is normal. No respiratory distress.      Breath sounds: Normal breath sounds.   Neurological:      Mental Status: He is alert.   Psychiatric:         Mood and Affect: Affect normal.         Judgment: Judgment normal.       Assessment/Plan:     1. Encounter for patient concern about exposure to  infectious organism  Testing performed for COVID-19. In the meantime patient was advised to isolate until COVID test results returned. I encouraged mask use, frequent handwashing, wiping down hard surfaces, etc. Tylenol and Ibuprofen were recommended for symptomatic relief. If positive they will be contacted by their local health department regarding return to work/school protocols. Patient is currently without indication of need for higher level of care. Patient/Guardian was given precautionary signs/symptoms that mandate immediate follow up and evaluation in the ED. The patient and/or guardian demonstrated a good understanding and agreed with the treatment plan.  - SARS-CoV-2 PCR (24 hour In-House): Collect NP swab in VTM; Future    2. Elevated blood pressure reading  Asymptomatic.  Patient encouraged to follow with his PCP.      Instructed to return to Urgent Care or nearest Emergency Department if symptoms fail to improve, for any change in condition, further concerns, or new concerning symptoms. Patient states understanding of the plan of care and discharge instructions.    Shantal Jimenez M.D.

## 2022-02-01 ENCOUNTER — APPOINTMENT (OUTPATIENT)
Dept: RADIOLOGY | Facility: MEDICAL CENTER | Age: 47
End: 2022-02-01
Attending: STUDENT IN AN ORGANIZED HEALTH CARE EDUCATION/TRAINING PROGRAM
Payer: COMMERCIAL

## 2022-02-01 ENCOUNTER — HOSPITAL ENCOUNTER (EMERGENCY)
Facility: MEDICAL CENTER | Age: 47
End: 2022-02-02
Attending: STUDENT IN AN ORGANIZED HEALTH CARE EDUCATION/TRAINING PROGRAM
Payer: COMMERCIAL

## 2022-02-01 DIAGNOSIS — R27.0 ATAXIA: ICD-10-CM

## 2022-02-01 DIAGNOSIS — R42 VERTIGO: Primary | ICD-10-CM

## 2022-02-01 DIAGNOSIS — R42 SEVERE DIZZINESS: ICD-10-CM

## 2022-02-01 LAB
ABO GROUP BLD: NORMAL
ALBUMIN SERPL BCP-MCNC: 4.6 G/DL (ref 3.2–4.9)
ALBUMIN/GLOB SERPL: 1.6 G/DL
ALP SERPL-CCNC: 85 U/L (ref 30–99)
ALT SERPL-CCNC: 17 U/L (ref 2–50)
ANION GAP SERPL CALC-SCNC: 13 MMOL/L (ref 7–16)
APTT PPP: 30.1 SEC (ref 24.7–36)
AST SERPL-CCNC: 12 U/L (ref 12–45)
BASOPHILS # BLD AUTO: 0.4 % (ref 0–1.8)
BASOPHILS # BLD: 0.03 K/UL (ref 0–0.12)
BILIRUB SERPL-MCNC: 0.6 MG/DL (ref 0.1–1.5)
BLD GP AB SCN SERPL QL: NORMAL
BUN SERPL-MCNC: 26 MG/DL (ref 8–22)
CALCIUM SERPL-MCNC: 9.8 MG/DL (ref 8.5–10.5)
CHLORIDE SERPL-SCNC: 105 MMOL/L (ref 96–112)
CO2 SERPL-SCNC: 22 MMOL/L (ref 20–33)
CREAT SERPL-MCNC: 0.93 MG/DL (ref 0.5–1.4)
EOSINOPHIL # BLD AUTO: 0.13 K/UL (ref 0–0.51)
EOSINOPHIL NFR BLD: 1.9 % (ref 0–6.9)
ERYTHROCYTE [DISTWIDTH] IN BLOOD BY AUTOMATED COUNT: 36.8 FL (ref 35.9–50)
GLOBULIN SER CALC-MCNC: 2.9 G/DL (ref 1.9–3.5)
GLUCOSE BLD-MCNC: 103 MG/DL (ref 65–99)
GLUCOSE SERPL-MCNC: 101 MG/DL (ref 65–99)
HCT VFR BLD AUTO: 43 % (ref 42–52)
HGB BLD-MCNC: 15.5 G/DL (ref 14–18)
IMM GRANULOCYTES # BLD AUTO: 0.02 K/UL (ref 0–0.11)
IMM GRANULOCYTES NFR BLD AUTO: 0.3 % (ref 0–0.9)
INR PPP: 0.98 (ref 0.87–1.13)
LYMPHOCYTES # BLD AUTO: 1.68 K/UL (ref 1–4.8)
LYMPHOCYTES NFR BLD: 25 % (ref 22–41)
MCH RBC QN AUTO: 30.2 PG (ref 27–33)
MCHC RBC AUTO-ENTMCNC: 36 G/DL (ref 33.7–35.3)
MCV RBC AUTO: 83.7 FL (ref 81.4–97.8)
MONOCYTES # BLD AUTO: 0.54 K/UL (ref 0–0.85)
MONOCYTES NFR BLD AUTO: 8 % (ref 0–13.4)
NEUTROPHILS # BLD AUTO: 4.32 K/UL (ref 1.82–7.42)
NEUTROPHILS NFR BLD: 64.4 % (ref 44–72)
NRBC # BLD AUTO: 0 K/UL
NRBC BLD-RTO: 0 /100 WBC
PLATELET # BLD AUTO: 216 K/UL (ref 164–446)
PMV BLD AUTO: 10.7 FL (ref 9–12.9)
POTASSIUM SERPL-SCNC: 4 MMOL/L (ref 3.6–5.5)
PROT SERPL-MCNC: 7.5 G/DL (ref 6–8.2)
PROTHROMBIN TIME: 12.7 SEC (ref 12–14.6)
RBC # BLD AUTO: 5.14 M/UL (ref 4.7–6.1)
RH BLD: NORMAL
SODIUM SERPL-SCNC: 140 MMOL/L (ref 135–145)
TROPONIN T SERPL-MCNC: <6 NG/L (ref 6–19)
WBC # BLD AUTO: 6.7 K/UL (ref 4.8–10.8)

## 2022-02-01 PROCEDURE — 85025 COMPLETE CBC W/AUTO DIFF WBC: CPT

## 2022-02-01 PROCEDURE — 96375 TX/PRO/DX INJ NEW DRUG ADDON: CPT

## 2022-02-01 PROCEDURE — 700105 HCHG RX REV CODE 258: Performed by: STUDENT IN AN ORGANIZED HEALTH CARE EDUCATION/TRAINING PROGRAM

## 2022-02-01 PROCEDURE — 80053 COMPREHEN METABOLIC PANEL: CPT

## 2022-02-01 PROCEDURE — 93005 ELECTROCARDIOGRAM TRACING: CPT | Performed by: STUDENT IN AN ORGANIZED HEALTH CARE EDUCATION/TRAINING PROGRAM

## 2022-02-01 PROCEDURE — 99285 EMERGENCY DEPT VISIT HI MDM: CPT

## 2022-02-01 PROCEDURE — 0042T CT-CEREBRAL PERFUSION ANALYSIS: CPT

## 2022-02-01 PROCEDURE — 82962 GLUCOSE BLOOD TEST: CPT

## 2022-02-01 PROCEDURE — 85730 THROMBOPLASTIN TIME PARTIAL: CPT

## 2022-02-01 PROCEDURE — 86900 BLOOD TYPING SEROLOGIC ABO: CPT

## 2022-02-01 PROCEDURE — 70496 CT ANGIOGRAPHY HEAD: CPT

## 2022-02-01 PROCEDURE — 70498 CT ANGIOGRAPHY NECK: CPT

## 2022-02-01 PROCEDURE — 84484 ASSAY OF TROPONIN QUANT: CPT

## 2022-02-01 PROCEDURE — 71045 X-RAY EXAM CHEST 1 VIEW: CPT

## 2022-02-01 PROCEDURE — 700111 HCHG RX REV CODE 636 W/ 250 OVERRIDE (IP): Performed by: STUDENT IN AN ORGANIZED HEALTH CARE EDUCATION/TRAINING PROGRAM

## 2022-02-01 PROCEDURE — 85610 PROTHROMBIN TIME: CPT

## 2022-02-01 PROCEDURE — 700117 HCHG RX CONTRAST REV CODE 255: Performed by: STUDENT IN AN ORGANIZED HEALTH CARE EDUCATION/TRAINING PROGRAM

## 2022-02-01 PROCEDURE — 93005 ELECTROCARDIOGRAM TRACING: CPT

## 2022-02-01 PROCEDURE — 86850 RBC ANTIBODY SCREEN: CPT

## 2022-02-01 PROCEDURE — 70450 CT HEAD/BRAIN W/O DYE: CPT

## 2022-02-01 PROCEDURE — 86901 BLOOD TYPING SEROLOGIC RH(D): CPT

## 2022-02-01 PROCEDURE — 96374 THER/PROPH/DIAG INJ IV PUSH: CPT

## 2022-02-01 RX ORDER — DEXAMETHASONE SODIUM PHOSPHATE 4 MG/ML
6 INJECTION, SOLUTION INTRA-ARTICULAR; INTRALESIONAL; INTRAMUSCULAR; INTRAVENOUS; SOFT TISSUE ONCE
Status: COMPLETED | OUTPATIENT
Start: 2022-02-01 | End: 2022-02-01

## 2022-02-01 RX ORDER — PROCHLORPERAZINE EDISYLATE 5 MG/ML
10 INJECTION INTRAMUSCULAR; INTRAVENOUS ONCE
Status: COMPLETED | OUTPATIENT
Start: 2022-02-01 | End: 2022-02-01

## 2022-02-01 RX ORDER — SODIUM CHLORIDE 9 MG/ML
1000 INJECTION, SOLUTION INTRAVENOUS ONCE
Status: COMPLETED | OUTPATIENT
Start: 2022-02-01 | End: 2022-02-02

## 2022-02-01 RX ORDER — DIPHENHYDRAMINE HYDROCHLORIDE 50 MG/ML
12.5 INJECTION INTRAMUSCULAR; INTRAVENOUS ONCE
Status: COMPLETED | OUTPATIENT
Start: 2022-02-01 | End: 2022-02-01

## 2022-02-01 RX ORDER — KETOROLAC TROMETHAMINE 30 MG/ML
15 INJECTION, SOLUTION INTRAMUSCULAR; INTRAVENOUS ONCE
Status: COMPLETED | OUTPATIENT
Start: 2022-02-01 | End: 2022-02-01

## 2022-02-01 RX ADMIN — IOHEXOL 40 ML: 350 INJECTION, SOLUTION INTRAVENOUS at 23:21

## 2022-02-01 RX ADMIN — IOHEXOL 80 ML: 350 INJECTION, SOLUTION INTRAVENOUS at 23:24

## 2022-02-01 RX ADMIN — PROCHLORPERAZINE EDISYLATE 10 MG: 5 INJECTION INTRAMUSCULAR; INTRAVENOUS at 23:46

## 2022-02-01 RX ADMIN — KETOROLAC TROMETHAMINE 15 MG: 30 INJECTION, SOLUTION INTRAMUSCULAR at 23:46

## 2022-02-01 RX ADMIN — DEXAMETHASONE SODIUM PHOSPHATE 6 MG: 4 INJECTION, SOLUTION INTRA-ARTICULAR; INTRALESIONAL; INTRAMUSCULAR; INTRAVENOUS; SOFT TISSUE at 23:47

## 2022-02-01 RX ADMIN — SODIUM CHLORIDE 1000 ML: 9 INJECTION, SOLUTION INTRAVENOUS at 23:47

## 2022-02-01 RX ADMIN — DIPHENHYDRAMINE HYDROCHLORIDE 12.5 MG: 50 INJECTION INTRAMUSCULAR; INTRAVENOUS at 23:47

## 2022-02-01 ASSESSMENT — ENCOUNTER SYMPTOMS
NECK PAIN: 0
SORE THROAT: 0
NAUSEA: 1
COUGH: 0
ABDOMINAL PAIN: 0
DIZZINESS: 1
PALPITATIONS: 1
FALLS: 0
CHILLS: 0
VOMITING: 0
HEADACHES: 1
DOUBLE VISION: 0
SHORTNESS OF BREATH: 0
LOSS OF CONSCIOUSNESS: 0
FEVER: 0
BLURRED VISION: 0

## 2022-02-01 ASSESSMENT — FIBROSIS 4 INDEX: FIB4 SCORE: 0.92

## 2022-02-02 VITALS
HEART RATE: 55 BPM | BODY MASS INDEX: 33.18 KG/M2 | TEMPERATURE: 98.5 F | SYSTOLIC BLOOD PRESSURE: 137 MMHG | OXYGEN SATURATION: 92 % | RESPIRATION RATE: 16 BRPM | DIASTOLIC BLOOD PRESSURE: 72 MMHG | WEIGHT: 245 LBS | HEIGHT: 72 IN

## 2022-02-02 LAB — EKG IMPRESSION: NORMAL

## 2022-02-02 PROCEDURE — 700102 HCHG RX REV CODE 250 W/ 637 OVERRIDE(OP): Performed by: STUDENT IN AN ORGANIZED HEALTH CARE EDUCATION/TRAINING PROGRAM

## 2022-02-02 PROCEDURE — A9270 NON-COVERED ITEM OR SERVICE: HCPCS | Performed by: STUDENT IN AN ORGANIZED HEALTH CARE EDUCATION/TRAINING PROGRAM

## 2022-02-02 RX ORDER — LORAZEPAM 1 MG/1
1 TABLET ORAL ONCE
Status: COMPLETED | OUTPATIENT
Start: 2022-02-02 | End: 2022-02-02

## 2022-02-02 RX ORDER — MECLIZINE HYDROCHLORIDE 25 MG/1
25 TABLET ORAL 3 TIMES DAILY PRN
Qty: 30 TABLET | Refills: 0 | Status: SHIPPED | OUTPATIENT
Start: 2022-02-02 | End: 2022-05-03

## 2022-02-02 RX ORDER — MECLIZINE HYDROCHLORIDE 25 MG/1
25 TABLET ORAL ONCE
Status: COMPLETED | OUTPATIENT
Start: 2022-02-02 | End: 2022-02-02

## 2022-02-02 RX ADMIN — LORAZEPAM 1 MG: 1 TABLET ORAL at 00:18

## 2022-02-02 RX ADMIN — MECLIZINE HYDROCHLORIDE 25 MG: 25 TABLET ORAL at 00:18

## 2022-02-02 NOTE — DISCHARGE INSTRUCTIONS
Please return to the emergency department immediately if you experience any worsening symptoms, chest pain, shortness of breath, nausea, vomiting, diarrhea, fevers.

## 2022-02-02 NOTE — ED TRIAGE NOTES
"Jensen Donald  46 y.o.  Chief Complaint   Patient presents with   • Dizziness     began this morning, states was able to go to work, got home around 6pm and felt like the room was spinning, also reports a generalized headache, sensitivity to light and nausea   • Palpitations     states took a nap, woke up and felt like heart was racing, states \"feels like butterflies in my chest\"     Patient WC to triage for above. EKG completed prior to triage.    Denies speech changes, no facial droop or unilateral weakness. Pt A&Ox4, GCS 15, in NAD at this time.  "

## 2022-02-02 NOTE — DISCHARGE PLANNING
Stroke IR    Pt arrived to ED via private vehicle.   Pt is Jensen Lopez  Wife is Fern Donald [phone]    Pt taken to CT -SW took Pt wife to [facility] [address].     No other needs at this time.

## 2022-02-02 NOTE — ED PROVIDER NOTES
ED Provider Note    Chief Complaint:   Dizziness    HPI:  Jensen Donald is a very pleasant 46-year-old male with past medical history of atrial fibrillation and hypertension who presents with acute onset room spinning dizziness starting at 5:30 PM today.  Patient reportedly was having intermittent dizziness since Saturday but it became very severe at 5:30 PM after waking up from a nap.  Patient reports generalized headache, sensitivity to light and nausea.  Patient denies unilateral weakness or numbness, slurred speech.    Review of Systems:  Review of Systems   Constitutional: Negative for chills and fever.   HENT: Negative for congestion and sore throat.    Eyes: Negative for blurred vision and double vision.   Respiratory: Negative for cough and shortness of breath.    Cardiovascular: Positive for palpitations. Negative for chest pain and leg swelling.   Gastrointestinal: Positive for nausea. Negative for abdominal pain and vomiting.   Genitourinary: Negative for dysuria and hematuria.   Musculoskeletal: Negative for falls and neck pain.   Skin: Negative for itching and rash.   Neurological: Positive for dizziness and headaches. Negative for loss of consciousness.       Past Medical History:   has a past medical history of A-fib (HCC), Geographic tongue, and Hypertension.    Social History:  Social History     Tobacco Use   • Smoking status: Never Smoker   • Smokeless tobacco: Never Used   Substance and Sexual Activity   • Alcohol use: Not Currently     Alcohol/week: 1.8 oz     Types: 1 Glasses of wine, 1 Cans of beer, 1 Shots of liquor per week     Comment: rarely   • Drug use: No   • Sexual activity: Yes     Partners: Female     Comment: wife, Fern       Surgical History:  patient denies any surgical history    Allergies:  Allergies   Allergen Reactions   • Diltiazem Hcl      Other reaction(s): Headache   • Lisinopril      cough   • Other Drug      Can't remember.  Few yrs ago. Anti-anxiety pill        Physical Exam:  Vital Signs: /72   Pulse (!) 55   Temp 37 °C (98.6 °F) (Temporal)   Resp 16   Ht 1.829 m (6')   Wt 111 kg (245 lb)   SpO2 92%   BMI 33.23 kg/m²   Physical Exam  Vitals and nursing note reviewed.   Constitutional:       Comments: Patient is sitting in chair, head lying back, appears uncomfortable, alert and oriented x3   HENT:      Head: Normocephalic and atraumatic.      Right Ear: Tympanic membrane normal.      Left Ear: Tympanic membrane normal.   Eyes:      Extraocular Movements: Extraocular movements intact.      Pupils: Pupils are equal, round, and reactive to light.      Comments: Mild bilateral conjunctival injection   Cardiovascular:      Pulses: Normal pulses.      Comments: HR 74  Pulmonary:      Effort: Pulmonary effort is normal. No respiratory distress.   Musculoskeletal:         General: No swelling. Normal range of motion.      Cervical back: Normal range of motion. No rigidity.   Skin:     General: Skin is warm and dry.      Capillary Refill: Capillary refill takes less than 2 seconds.   Neurological:      Mental Status: He is alert.      Comments: CN II-XII intact, speech normal, motor strength 5/5 in all four extremities, normal  strength bilaterally, sensation to light touch grossly intact in all extremities, no finger to nose dysmetria, no pronator drift, no nystagmus, AO x3.  Patient is very unstable when ambulated.  Symptoms of dizziness are significantly worsen when standing up and ambulating and when patient's head is moved weekly.         Medical records reviewed for continuity of care.     Results for orders placed or performed during the hospital encounter of 02/01/22   CBC WITH DIFFERENTIAL   Result Value Ref Range    WBC 6.7 4.8 - 10.8 K/uL    RBC 5.14 4.70 - 6.10 M/uL    Hemoglobin 15.5 14.0 - 18.0 g/dL    Hematocrit 43.0 42.0 - 52.0 %    MCV 83.7 81.4 - 97.8 fL    MCH 30.2 27.0 - 33.0 pg    MCHC 36.0 (H) 33.7 - 35.3 g/dL    RDW 36.8 35.9 - 50.0 fL     Platelet Count 216 164 - 446 K/uL    MPV 10.7 9.0 - 12.9 fL    Neutrophils-Polys 64.40 44.00 - 72.00 %    Lymphocytes 25.00 22.00 - 41.00 %    Monocytes 8.00 0.00 - 13.40 %    Eosinophils 1.90 0.00 - 6.90 %    Basophils 0.40 0.00 - 1.80 %    Immature Granulocytes 0.30 0.00 - 0.90 %    Nucleated RBC 0.00 /100 WBC    Neutrophils (Absolute) 4.32 1.82 - 7.42 K/uL    Lymphs (Absolute) 1.68 1.00 - 4.80 K/uL    Monos (Absolute) 0.54 0.00 - 0.85 K/uL    Eos (Absolute) 0.13 0.00 - 0.51 K/uL    Baso (Absolute) 0.03 0.00 - 0.12 K/uL    Immature Granulocytes (abs) 0.02 0.00 - 0.11 K/uL    NRBC (Absolute) 0.00 K/uL   COMP METABOLIC PANEL   Result Value Ref Range    Sodium 140 135 - 145 mmol/L    Potassium 4.0 3.6 - 5.5 mmol/L    Chloride 105 96 - 112 mmol/L    Co2 22 20 - 33 mmol/L    Anion Gap 13.0 7.0 - 16.0    Glucose 101 (H) 65 - 99 mg/dL    Bun 26 (H) 8 - 22 mg/dL    Creatinine 0.93 0.50 - 1.40 mg/dL    Calcium 9.8 8.5 - 10.5 mg/dL    AST(SGOT) 12 12 - 45 U/L    ALT(SGPT) 17 2 - 50 U/L    Alkaline Phosphatase 85 30 - 99 U/L    Total Bilirubin 0.6 0.1 - 1.5 mg/dL    Albumin 4.6 3.2 - 4.9 g/dL    Total Protein 7.5 6.0 - 8.2 g/dL    Globulin 2.9 1.9 - 3.5 g/dL    A-G Ratio 1.6 g/dL   PROTHROMBIN TIME   Result Value Ref Range    PT 12.7 12.0 - 14.6 sec    INR 0.98 0.87 - 1.13   APTT   Result Value Ref Range    APTT 30.1 24.7 - 36.0 sec   COD (ADULT)   Result Value Ref Range    ABO Grouping Only O     Rh Grouping Only NEG     Antibody Screen-Cod NEG    TROPONIN   Result Value Ref Range    Troponin T <6 6 - 19 ng/L   ESTIMATED GFR   Result Value Ref Range    GFR If African American >60 >60 mL/min/1.73 m 2    GFR If Non African American >60 >60 mL/min/1.73 m 2   EKG   Result Value Ref Range    Report       [facility] Emergency Dept.    Test Date:  2022-02-01  Pt Name:    MERCEDEZ BRIDGES            Department: ER  MRN:        [id number]                      Room:  Gender:     Male                          Technician: [id number]  :        1975                   Requested By:ER TRIAGE PROTOCOL  Order #:    [id number]                    Reading MD: John Jiang MD    Measurements  Intervals                                Axis  Rate:       70                           P:          43  HI:         168                          QRS:        49  QRSD:       90                           T:          -30  QT:         388  QTc:        419    Interpretive Statements  SINUS RHYTHM  Lateral T wave flattening, no ST elevations or ST depressions  Electronically Signed On 2022 1:54:56 PST by John Jiang MD     POCT glucose device results   Result Value Ref Range    Glucose - Accu-Ck 103 (H) 65 - 99 mg/dL       Radiology:  DX-CHEST-PORTABLE (1 VIEW)   Final Result         1.  No acute cardiopulmonary disease.      CT-CTA NECK WITH & W/O-POST PROCESSING   Final Result         1.  CT angiogram of the neck within normal limits.         CT-CEREBRAL PERFUSION ANALYSIS   Final Result         1.  Cerebral blood flow less than 30% likely representing completed infarct = 0 mL.      2.  T Max more than 6 seconds likely representing combination of completed infarct and ischemia = 0 mL.      3.  Mismatched volume likely representing ischemic brain/penumbra = None      4.  Please note that the cerebral perfusion was performed on the limited brain tissue around the basal ganglia region. Infarct/ischemia outside the CT perfusion sections can be missed in this study.      CT-CTA HEAD WITH & W/O-POST PROCESS   Final Result         1.  No large vessel occlusion or aneurysm identified      CT-HEAD W/O   Final Result         1.  No acute intracranial abnormality.              MDM:  CTA head neck to evaluate for large vessel occlusion.  CT head without to evaluate for intracranial hemorrhage.  Posterior circulation CVA is within the differential as is vestibular neuritis, labyrinthitis, Ménière's disease, BPPV, complex migraine.  CBC to  evaluate for acute anemia and leukocytosis.  CMP to evaluate for acute electrolyte abnormality, acute kidney injury, acute liver failure or dysfunction.  Troponin, EKG to evaluate for ACS.  Disposition pending work-up.    Electronically signed by: John Jiang M.D., 2/1/2022, 10:56 PM    CMP demonstrates no evidence of acute kidney injury, acute electrolyte abnormality, acute liver failure, CBC demonstrates no evidence of acute anemia or leukocytosis.  Troponin negative, ACS is inconsistent with patient presentation at this time.  Patient was given dexamethasone for potential vestibular neuritis despite no recent history of upper respiratory infection.  Patient reports improved symptoms following ministration of headache cocktail which is Toradol, Compazine, Benadryl, IV fluids.  Patient however had an akathetic reaction and was given Ativan for which he improved his anxiety.  Patient also given meclizine for vertiginous symptoms.  Patient is able to ambulate at this time, move his head, has no resurgence of symptoms.  I spoke to Dr. Lynch of neurology who does not believe the patient be suffering from any acute intracranial process such as CVA, CT imaging unremarkable for intracranial hemorrhage or large vessel occlusion.  Neurology is recommending patient follow-up outpatient with PCP.    Repeat physical exam benign.  I doubt any serious emergency process at this time.  Patient and/or family, friends given strict return precautions and care instructions. They have demonstrated understanding of discharge instructions through teach back mechanism. Advised PCP follow-up in 1-2 days.  Patient/family/friend expresses understanding and agrees to plan.    This dictation has been created using voice recognition software. I am continuously working with the software to minimize the number of voice recognition errors and I have made every attempt to manually correct the errors within my dictation. However errors   related to this voice recognition software may still exist and should be interpreted within the appropriate context.     Electronically signed by: John Jiang M.D., 2/2/2022 2:46 AM    Critical Care    I spent 35 minutes of critical care time with this patient. This does not include time spent on separately reported billable procedures.   This includes pulse ox interpretation, medical record review when available, interpretation of labs and imaging, specialist consultation, and hemodynamic monitoring.      Disposition:  Home    Final Impression:  1. Vertigo    2. Ataxia    3. Severe dizziness

## 2022-02-02 NOTE — ED NOTES
Discharge teaching with paperwork regarding diagnoses, new prescription and follow up care provided to pt. Pt verbalized understanding of teaching and all questions answered. Pt is A&Ox4 with stable vital signs, stable physical assessment, and PIV removed upon discharge. Pt ambulatory out of ED with steady gait with all personal belongings.

## 2022-02-11 ENCOUNTER — OFFICE VISIT (OUTPATIENT)
Dept: URGENT CARE | Facility: PHYSICIAN GROUP | Age: 47
End: 2022-02-11
Payer: COMMERCIAL

## 2022-02-11 VITALS
TEMPERATURE: 98.6 F | HEART RATE: 89 BPM | HEIGHT: 72 IN | RESPIRATION RATE: 16 BRPM | WEIGHT: 236 LBS | OXYGEN SATURATION: 98 % | SYSTOLIC BLOOD PRESSURE: 138 MMHG | DIASTOLIC BLOOD PRESSURE: 90 MMHG | BODY MASS INDEX: 31.97 KG/M2

## 2022-02-11 DIAGNOSIS — I10 ESSENTIAL HYPERTENSION: ICD-10-CM

## 2022-02-11 PROCEDURE — 99213 OFFICE O/P EST LOW 20 MIN: CPT | Performed by: STUDENT IN AN ORGANIZED HEALTH CARE EDUCATION/TRAINING PROGRAM

## 2022-02-11 RX ORDER — PANTOPRAZOLE SODIUM 40 MG/1
TABLET, DELAYED RELEASE ORAL
COMMUNITY
Start: 2022-01-17 | End: 2022-02-11

## 2022-02-11 RX ORDER — LOSARTAN POTASSIUM 25 MG/1
25 TABLET ORAL DAILY
Qty: 30 TABLET | Refills: 0 | Status: SHIPPED | OUTPATIENT
Start: 2022-02-11 | End: 2022-02-22

## 2022-02-11 ASSESSMENT — FIBROSIS 4 INDEX: FIB4 SCORE: 0.62

## 2022-02-11 NOTE — PROGRESS NOTES
Subjective     Jensen Donald is a 46 y.o. male who presents with Hypertension (high blood pressure at procedure yesterday. ) and Vertigo (pt states he went to hospital for vertigo, room spinning )            Patient is a very agreeable 46-year-old  the presents clinic with complaints of elevated blood pressure.  Patient had a colonoscopy performed yesterday during which they told him he had elevated blood pressure.  Patient does not recall exact numbers however believes they are between 140 and 150 systolic.  Furthermore patient was recently evaluated the emergency department within the past week for her dizziness.  He had complete work-up including CT scans of the head for possible infarction all of which were completely normal and negative.  Patient presents today for blood pressure medication management.  Patient denies nausea vomiting diarrhea patient Dors mild tenderness however this is been going on for several months.      Review of Systems   HENT: Positive for tinnitus.    All other systems reviewed and are negative.             Objective     /90   Pulse 89   Temp 37 °C (98.6 °F) (Temporal)   Resp 16   Ht 1.829 m (6')   Wt 107 kg (236 lb)   SpO2 98%   BMI 32.01 kg/m²      Physical Exam  Vitals reviewed.   Constitutional:       Appearance: Normal appearance.   HENT:      Head: Normocephalic and atraumatic.      Right Ear: Tympanic membrane, ear canal and external ear normal. There is no impacted cerumen.      Left Ear: Tympanic membrane, ear canal and external ear normal. There is no impacted cerumen.   Eyes:      Extraocular Movements: Extraocular movements intact.      Conjunctiva/sclera: Conjunctivae normal.      Pupils: Pupils are equal, round, and reactive to light.   Neurological:      General: No focal deficit present.      Mental Status: He is alert and oriented to person, place, and time.                             Assessment & Plan        1. Essential hypertension  Counseled  patient with regards to keeping home blood pressure log.  Patient is already has less than 2 g of sodium in his diet per day.  Furthermore patient has recently lost his primary care provider as they moved on the area.  Plan:  1.  Referral to establish with primary care provider  2.  Losartan 25 mg to be taken daily.  Furthermore patient was counseled to keep blood pressure log daily and measure his blood pressure every day at the same time for least the first week.  Following that week he can measure every other day at the same time during the day.  Furthermore patient was counseled that he should bring his blood pressure log to clinic with him on his initial establishment appointment with primary care provider.  Patient endorsed understanding of all instructions via teach back.  - Referral to establish with [facility] PCP  - losartan (COZAAR) 25 MG Tab; Take 1 Tablet by mouth every day.  Dispense: 30 Tablet; Refill: 0

## 2022-02-14 ENCOUNTER — HOSPITAL ENCOUNTER (EMERGENCY)
Facility: MEDICAL CENTER | Age: 47
End: 2022-02-15
Attending: EMERGENCY MEDICINE
Payer: COMMERCIAL

## 2022-02-14 DIAGNOSIS — G43.909 MIGRAINE WITHOUT STATUS MIGRAINOSUS, NOT INTRACTABLE, UNSPECIFIED MIGRAINE TYPE: ICD-10-CM

## 2022-02-14 ASSESSMENT — FIBROSIS 4 INDEX: FIB4 SCORE: 0.62

## 2022-02-15 VITALS
BODY MASS INDEX: 32.79 KG/M2 | TEMPERATURE: 97.4 F | SYSTOLIC BLOOD PRESSURE: 152 MMHG | HEIGHT: 72 IN | HEART RATE: 58 BPM | WEIGHT: 242.06 LBS | DIASTOLIC BLOOD PRESSURE: 84 MMHG | OXYGEN SATURATION: 94 % | RESPIRATION RATE: 20 BRPM

## 2022-02-15 PROCEDURE — 700101 HCHG RX REV CODE 250: Performed by: EMERGENCY MEDICINE

## 2022-02-15 PROCEDURE — 64405 NJX AA&/STRD GR OCPL NRV: CPT

## 2022-02-15 PROCEDURE — 700111 HCHG RX REV CODE 636 W/ 250 OVERRIDE (IP): Performed by: EMERGENCY MEDICINE

## 2022-02-15 PROCEDURE — 99283 EMERGENCY DEPT VISIT LOW MDM: CPT

## 2022-02-15 RX ORDER — BUPIVACAINE HYDROCHLORIDE AND EPINEPHRINE 5; 5 MG/ML; UG/ML
10 INJECTION, SOLUTION EPIDURAL; INTRACAUDAL; PERINEURAL ONCE
Status: COMPLETED | OUTPATIENT
Start: 2022-02-15 | End: 2022-02-15

## 2022-02-15 RX ORDER — DEXAMETHASONE SODIUM PHOSPHATE 10 MG/ML
10 INJECTION, SOLUTION INTRAMUSCULAR; INTRAVENOUS ONCE
Status: COMPLETED | OUTPATIENT
Start: 2022-02-15 | End: 2022-02-15

## 2022-02-15 RX ADMIN — BUPIVACAINE HYDROCHLORIDE AND EPINEPHRINE 10 ML: 5; 5 INJECTION, SOLUTION EPIDURAL; INTRACAUDAL; PERINEURAL at 00:30

## 2022-02-15 RX ADMIN — DEXAMETHASONE SODIUM PHOSPHATE 10 MG: 10 INJECTION INTRAMUSCULAR; INTRAVENOUS at 01:54

## 2022-02-15 NOTE — DISCHARGE INSTRUCTIONS
You were seen in the ED today for a headache. Your physical exam was reassuring and you were given medication to help control your symptoms. Your headache does not appear to be dangerous. We have also provided you with a medication in the ED which can help control symptoms from this and future headaches.        For pain you can take acetaminophen (Tylenol), 1000mg every 8 hours as needed for pain. Do not take more than 3000mg of acetaminophen in any 24 hour period. You can also take  ibuprofen (Motrin), 600mg every 6 hours as needed for pain (take with food to avoid GI upset).      You have been referred to neurology for follow-up.  If they do not contact you to schedule appointment, you may contact the number provided and schedule appointment.    Return to the ED if you develop a headache with a fever, neck stiffness, severe persistent headache, or new neurologic symptoms, such as numbness or tingling.

## 2022-02-15 NOTE — ED TRIAGE NOTES
Chief Complaint   Patient presents with   • Blurred Vision     Pt last seen x2 weeks ago for possible stroke. Pt states that since dischrge on 02/02 he's been having dizziness, headaches, and blurred vision.     Pt states it feels like the last time he had meningitis.    Pt educated upon triage process and told to inform  staff of any changes in condition so that Pt may be reassessed. No further questions at this time. Pt sitting out in lobby.

## 2022-02-15 NOTE — ED PROVIDER NOTES
ED Provider Note    Scribed for Rupert Brown M.D. by Tamy Baca. 2/14/2022,  11:45 PM.    Means of Arrival: Walk-in  History obtained from: Patient  History limited by: None    CHIEF COMPLAINT  Chief Complaint   Patient presents with    Blurred Vision     Pt last seen x2 weeks ago for possible stroke. Pt states that since dischrge on 02/02 he's been having dizziness, headaches, and blurred vision.       HPI  Jensen Donald is a 46 y.o. male who presents to the Emergency Department with blurred vision and headaches onset 2 weeks ago. The patient reports he visited the ED on 2/1 for the same symptoms and ruled out stroke with negative CT scans. He also endorses loss of balance and dizziness that feels like the room is spinning. He adds that he visited his PCP and was given blood pressure medication 2 days to help with his symptoms, but the headaches returned yesterday. He describes the headaches as 5/10 and notes the pain starts at the back of his head and radiates to the top and is exacerbated when he watches TV or moves quickly. He states he does not always get a headache after the blurred vision or dizziness. He endorses mild nausea, but denies any diplopia, fevers, abdominal pain, or vomiting. Patient reports tylenol alleviated the headache, but did not affect the blurred vision. Patient has a history of bacterial meningitis when he was 24 years old and needed a spinal tap. He also has a history of tinnitus in both ears and hypertension.     REVIEW OF SYSTEMS  CONSTITUTIONAL:  No fever.  GASTROINTESTINAL:  Nausea. No abdominal pain or vomiting.  NEUROLOGIC:   Headache. Blurred vision, dizziness, and loss of balance. No diplopia.   See HPI for further details.     PAST MEDICAL HISTORY  Past Medical History:   Diagnosis Date    A-fib (HCC)     4 years ago, one episode only, echo/stress test normal    Geographic tongue     Hypertension        FAMILY HISTORY  Family History   Problem Relation Age of  Onset    Diabetes Mother        SOCIAL HISTORY   reports that he has never smoked. He has never used smokeless tobacco. He reports previous alcohol use of about 1.8 oz of alcohol per week. He reports that he does not use drugs.    SURGICAL HISTORY  No past surgical history noted.     CURRENT MEDICATIONS  Home Medications    **Home medications have not yet been reviewed for this encounter**         ALLERGIES  Allergies   Allergen Reactions    Diltiazem Hcl      Other reaction(s): Headache    Lisinopril      cough    Other Drug      Can't remember.  Few yrs ago. Anti-anxiety pill       PHYSICAL EXAM  VITAL SIGNS: /93   Pulse 68   Temp 36.6 °C (97.8 °F) (Temporal)   Resp 14   Ht 1.829 m (6')   Wt 110 kg (242 lb 1 oz)   SpO2 98%   BMI 32.83 kg/m²      Gen: Alert, no acute distress  HEENT: ATNC. Normal tympanic membranes.  Normal oropharynx   Eyes: Right 20/20. Left 20/30. Combined 20/20.  EOMI, PERRLA  Neck: trachea midline, no meningismus  Resp: no respiratory distress, clear to auscultation bilaterally  CV: No JVD, regular rate and rhythm, no murmurs  Abd: non-distended, soft, nontender  Ext: No deformities  Psych: normal mood  Neuro: Cranial nerves II - XII intact. NIHSS is 0. Normal cerebellar function.     COURSE & MEDICAL DECISION MAKING  Pertinent Labs & Imaging studies reviewed. (See chart for details)    11:45 PM Patient seen and examined at bedside. Patient will be treated with Marcaine 10 ml for his symptoms. Informed patient that he likely has vestibular migraine headaches, which causes dizziness.     12:41 AM - Patient was reevaluated at bedside and reports feeling pain on one side of his head still, so he was treated with an additional Marcaine injection.     1:27 AM - Patient was reevaluated at bedside and reports feeling improved. Informed patient that he likely had a migraine and will be treated with a dose of steroids and will be given a referral for [facility]. Recommended aleve or  ibuprofen for pain and staying hydrated. Patient is stable for discharge. ED return precautions discussed. Patient was given the opportunity to ask questions and verbalizes agreement to the plan of care.      Procedure: Greater occipital nerve block  Indication: Headache  Informed consent was obtained verbally. This included risks, benefits and alternatives to the procedure. The patient was placed in appropriate position.  Occipital protuberance identified.  The skin was cleansed with alcohol. Approximately 3 mL of 0.5 bupivacaine with epinephrine was injected on either side of the occiput.  EBL: 0 mL  The patient tolerated the procedure without any immediate complications    PPE Note: I verified that the patient was wearing a mask and I was wearing appropriate PPE every time I entered the room. The patient's mask was on the patient at all times during my encounter except for a brief view of the oropharynx.     Scribe remained outside the patient's room and did not have any contact with the patient for the duration of patient encounter.     Medical Decision Making:  Patient presents with headache and blurred vision.  His visual acuity is actually quite good.  No focal neurologic deficits to suggest acute stroke.  The patient was recently seen for an acute vertigo episode with headache and had a negative CTA of the head and neck, no evidence of stroke, reassuring work-up at this time.  The patient does report a remote history of meningitis, however he has no fevers, no meningismus, no clinical signs that would suggest meningitis at this time.  The patient has a negative stroke scale, no evidence of acute otitis media.  The patient was given a greater occipital nerve block for migraine treatment, which improved his symptoms as well as blurry vision.  At this time, I believe he is having migraine headaches, including a likely vestibular migraine causing his previous vertigo symptoms.  The patient was referred to the  headache clinic.  Was given a dose of dexamethasone to prevent recurrent headache.     The patient will return for new or worsening symptoms and is stable at the time of discharge.    DISPOSITION:  Patient will be discharged home in stable condition.    FOLLOW UP:  [facility] - Neurology  [address], [address]  [facility] [id number] [phone]  Schedule an appointment as soon as possible for a visit       [facility], Emergency Dept  [address]  [facility] [id number] [phone]    If symptoms worsen      OUTPATIENT MEDICATIONS:  Discharge Medication List as of 2/15/2022  1:58 AM            FINAL IMPRESSION  1. Migraine without status migrainosus, not intractable, unspecified migraine type            I, Tamy Baca (Scribe), am scribing for, and in the presence of, Rupert Brown M.D..    Electronically signed by: Tamy Baca (Scribe), 2/14/2022    I, Rupert Brown M.D. personally performed the services described in this documentation, as scribed by Tamy Baca in my presence, and it is both accurate and complete. E    The note accurately reflects work and decisions made by me.  Rupert Brown M.D.  2/15/2022  5:19 AM    This dictation was created using voice recognition software. The accuracy of the dictation is limited to the abilities of the software. I expect there may be some errors of grammar and possibly content. The nursing notes were reviewed and certain aspects of this information were incorporated into this note.

## 2022-02-22 ENCOUNTER — OFFICE VISIT (OUTPATIENT)
Dept: NEUROLOGY | Facility: MEDICAL CENTER | Age: 47
End: 2022-02-22
Attending: NURSE PRACTITIONER
Payer: COMMERCIAL

## 2022-02-22 VITALS
HEART RATE: 66 BPM | SYSTOLIC BLOOD PRESSURE: 130 MMHG | RESPIRATION RATE: 14 BRPM | DIASTOLIC BLOOD PRESSURE: 82 MMHG | TEMPERATURE: 98.2 F | HEIGHT: 72 IN | BODY MASS INDEX: 32.84 KG/M2 | WEIGHT: 242.48 LBS | OXYGEN SATURATION: 99 %

## 2022-02-22 DIAGNOSIS — R00.2 PALPITATION: ICD-10-CM

## 2022-02-22 DIAGNOSIS — R42 VERTIGO: ICD-10-CM

## 2022-02-22 DIAGNOSIS — I48.91 ATRIAL FIBRILLATION, UNSPECIFIED TYPE (HCC): ICD-10-CM

## 2022-02-22 DIAGNOSIS — G43.109 MIGRAINE WITH AURA AND WITHOUT STATUS MIGRAINOSUS, NOT INTRACTABLE: ICD-10-CM

## 2022-02-22 PROCEDURE — 99204 OFFICE O/P NEW MOD 45 MIN: CPT | Performed by: NURSE PRACTITIONER

## 2022-02-22 PROCEDURE — 99211 OFF/OP EST MAY X REQ PHY/QHP: CPT | Performed by: NURSE PRACTITIONER

## 2022-02-22 RX ORDER — SUMATRIPTAN 100 MG/1
TABLET, FILM COATED ORAL
Qty: 9 TABLET | Refills: 11 | Status: SHIPPED | OUTPATIENT
Start: 2022-02-22 | End: 2023-03-06

## 2022-02-22 ASSESSMENT — ENCOUNTER SYMPTOMS
NAUSEA: 1
NECK PAIN: 0
TINGLING: 1
DIZZINESS: 1
SENSORY CHANGE: 0
SINUS PAIN: 0
SHORTNESS OF BREATH: 0
FEVER: 0
BLURRED VISION: 1
DEPRESSION: 0
PALPITATIONS: 0
BACK PAIN: 0
NERVOUS/ANXIOUS: 1
HEADACHES: 1
COUGH: 0

## 2022-02-22 ASSESSMENT — PATIENT HEALTH QUESTIONNAIRE - PHQ9
CLINICAL INTERPRETATION OF PHQ2 SCORE: 1
5. POOR APPETITE OR OVEREATING: 0 - NOT AT ALL
SUM OF ALL RESPONSES TO PHQ QUESTIONS 1-9: 5

## 2022-02-22 ASSESSMENT — FIBROSIS 4 INDEX: FIB4 SCORE: 0.62

## 2022-02-23 ENCOUNTER — NON-PROVIDER VISIT (OUTPATIENT)
Dept: NEUROLOGY | Facility: MEDICAL CENTER | Age: 47
End: 2022-02-23
Attending: NURSE PRACTITIONER
Payer: COMMERCIAL

## 2022-02-23 ENCOUNTER — NON-PROVIDER VISIT (OUTPATIENT)
Dept: CARDIOLOGY | Facility: MEDICAL CENTER | Age: 47
End: 2022-02-23
Payer: COMMERCIAL

## 2022-02-23 DIAGNOSIS — I48.91 ATRIAL FIBRILLATION, UNSPECIFIED TYPE (HCC): ICD-10-CM

## 2022-02-23 DIAGNOSIS — I49.1 PREMATURE ATRIAL CONTRACTIONS: ICD-10-CM

## 2022-02-23 DIAGNOSIS — R00.2 PALPITATION: ICD-10-CM

## 2022-02-23 DIAGNOSIS — I49.3 PVC (PREMATURE VENTRICULAR CONTRACTION): ICD-10-CM
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
## 2022-02-23 NOTE — PATIENT INSTRUCTIONS
May continue Meclizine for dizziness (vertigo)    For severe headache take Sumatriptan 100mg Take 1/2-1 tablet by mouth  at onset of headache, may repeat dose in 2 hours if unrelieved.  Do not exceed more than 2 tablets in 24 hours.      For migraine prevention take the following supplements every night at bedtime:     I  Start magnesium oxide 400mg gel cap,  by mouth every night, may take extra dose if needed for headache (over the counter), hold for diarrhea         Start Riboflavin (Vitamin B2) 400mg by mouth every night (over the counter),may turn urine bright yellow         Start COQ 10, take 300mg every night. (over the counter)          Attempt to go to bed and get up at the same time every night           Eat meals on regular basis            Stay hydrated.             Aerobic exercise 30 minutes daily             Avoid aged or smoked foods, avoid processed foods, red wine, aged cheese              Keep headache diary, include foods that you may have eaten.             Avoid overusing over the counter medications:  Do not take more than 500mg acetaminophen (tylenol), more than 4 times weekly, more frequent or larger doses are associated with medication overuse headache.        Look up Youtube video for Epley maneuver, practice this frequently, when vertigo (spinning) occurs, perform this maneuver--again--you will need to practice this frequently enough that you are confident enough to do this when you do not feel well.          How to Perform the Epley Maneuver  The Epley maneuver is an exercise that relieves symptoms of vertigo. Vertigo is the feeling that you or your surroundings are moving when they are not. When you feel vertigo, you may feel like the room is spinning and have trouble walking. Dizziness is a little different than vertigo. When you are dizzy, you may feel unsteady or light-headed.  You can do this maneuver at home whenever you have symptoms of vertigo. You can do it up to 3 times a day  until your symptoms go away.  Even though the Epley maneuver may relieve your vertigo for a few weeks, it is possible that your symptoms will return. This maneuver relieves vertigo, but it does not relieve dizziness.  What are the risks?  If it is done correctly, the Epley maneuver is considered safe. Sometimes it can lead to dizziness or nausea that goes away after a short time. If you develop other symptoms, such as changes in vision, weakness, or numbness, stop doing the maneuver and call your health care provider.  How to perform the Epley maneuver  1. Sit on the edge of a bed or table with your back straight and your legs extended or hanging over the edge of the bed or table.  2. Turn your head care home toward the affected ear or side.  3. Lie backward quickly with your head turned until you are lying flat on your back. You may want to position a pillow under your shoulders.  4. Hold this position for 30 seconds. You may experience an attack of vertigo. This is normal.  5. Turn your head to the opposite direction until your unaffected ear is facing the floor.  6. Hold this position for 30 seconds. You may experience an attack of vertigo. This is normal. Hold this position until the vertigo stops.  7. Turn your whole body to the same side as your head. Hold for another 30 seconds.  8. Sit back up.  You can repeat this exercise up to 3 times a day.  Follow these instructions at home:  · After doing the Epley maneuver, you can return to your normal activities.  · Ask your health care provider if there is anything you should do at home to prevent vertigo. He or she may recommend that you:  ? Keep your head raised (elevated) with two or more pillows while you sleep.  ? Do not sleep on the side of your affected ear.  ? Get up slowly from bed.  ? Avoid sudden movements during the day.  ? Avoid extreme head movement, like looking up or bending over.  Contact a health care provider if:  · Your vertigo gets worse.  · You  have other symptoms, including:  ? Nausea.  ? Vomiting.  ? Headache.  Get help right away if:  · You have vision changes.  · You have a severe or worsening headache or neck pain.  · You cannot stop vomiting.  · You have new numbness or weakness in any part of your body.  Summary  · Vertigo is the feeling that you or your surroundings are moving when they are not.  · The Epley maneuver is an exercise that relieves symptoms of vertigo.  · If the Epley maneuver is done correctly, it is considered safe. You can do it up to 3 times a day.  This information is not intended to replace advice given to you by your health care provider. Make sure you discuss any questions you have with your health care provider.  Document Released: 12/23/2014 Document Revised: 11/30/2018 Document Reviewed: 11/07/2017  Elsevier Patient Education © 2020 [employer]          Vertigo  Vertigo is the feeling that you or the things around you are moving when they are not. This feeling can come and go at any time. Vertigo often goes away on its own. This condition can be dangerous if it happens when you are doing activities like driving or working with machines.  Your doctor will do tests to find the cause of your vertigo. These tests will also help your doctor decide on the best treatment for you.  Follow these instructions at home:  Eating and drinking         · Drink enough fluid to keep your pee (urine) pale yellow.  · Do not drink alcohol.  Activity  · Return to your normal activities as told by your doctor. Ask your doctor what activities are safe for you.  · In the morning, first sit up on the side of the bed. When you feel okay, stand slowly while you hold onto something until you know that your balance is fine.  · Move slowly. Avoid sudden body or head movements or certain positions, as told by your doctor.  · Use a cane if you have trouble standing or walking.  · Sit down right away if you feel dizzy.  · Avoid doing any tasks or  activities that can cause danger to you or others if you get dizzy.  · Avoid bending down if you feel dizzy. Place items in your home so that they are easy for you to reach without leaning over.  · Do not drive or use heavy machinery if you feel dizzy.  General instructions  · Take over-the-counter and prescription medicines only as told by your doctor.  · Keep all follow-up visits as told by your doctor. This is important.  Contact a doctor if:  · Your medicine does not help your vertigo.  · You have a fever.  · Your problems get worse or you have new symptoms.  · Your family or friends see changes in your behavior.  · The feeling of being sick to your stomach gets worse.  · Your vomiting gets worse.  · You lose feeling (have numbness) in part of your body.  · You feel prickling and tingling in a part of your body.  Get help right away if:  · You have trouble moving or talking.  · You are always dizzy.  · You pass out (faint).  · You get very bad headaches.  · You feel weak in your hands, arms, or legs.  · You have changes in your hearing.  · You have changes in how you see (vision).  · You get a stiff neck.  · Bright light starts to bother you.  Summary  · Vertigo is the feeling that you or the things around you are moving when they are not.  · Your doctor will do tests to find the cause of your vertigo.  · You may be told to avoid some tasks, positions, or movements.  · Contact a doctor if your medicine is not helping, or if you have a fever, new symptoms, or a change in behavior.  · Get help right away if you get very bad headaches, or if you have changes in how you speak, hear, or see.  This information is not intended to replace advice given to you by your health care provider. Make sure you discuss any questions you have with your health care provider.  Document Released: 09/26/2009 Document Revised: 11/11/2019 Document Reviewed: 11/11/2019  Elsevier Patient Education © 2020 [employer]

## 2022-02-28 ENCOUNTER — TELEPHONE (OUTPATIENT)
Dept: NEUROLOGY | Facility: MEDICAL CENTER | Age: 47
End: 2022-02-28
Payer: COMMERCIAL

## 2022-03-01 NOTE — TELEPHONE ENCOUNTER
Received message on vm from Jennifer gaines [facility] . She stated patient is unreachable. I called patient he stated everything was ok and he has not received any call. I called Danny and informed them I spoke to patient and everything was ok and I verified with Orly they had the correct phone #.        Provider notified

## 2022-03-02 ENCOUNTER — OFFICE VISIT (OUTPATIENT)
Dept: MEDICAL GROUP | Facility: PHYSICIAN GROUP | Age: 47
End: 2022-03-02
Payer: COMMERCIAL

## 2022-03-02 VITALS
DIASTOLIC BLOOD PRESSURE: 72 MMHG | BODY MASS INDEX: 31.59 KG/M2 | OXYGEN SATURATION: 98 % | HEIGHT: 72 IN | WEIGHT: 233.2 LBS | HEART RATE: 66 BPM | TEMPERATURE: 99.9 F | SYSTOLIC BLOOD PRESSURE: 124 MMHG | RESPIRATION RATE: 16 BRPM

## 2022-03-02 DIAGNOSIS — E66.9 OBESITY (BMI 30-39.9): ICD-10-CM

## 2022-03-02 DIAGNOSIS — I10 PRIMARY HYPERTENSION: ICD-10-CM

## 2022-03-02 DIAGNOSIS — F41.1 ANXIETY STATE: ICD-10-CM

## 2022-03-02 DIAGNOSIS — M79.89 SOFT TISSUE MASS: ICD-10-CM

## 2022-03-02 DIAGNOSIS — H53.8 BLURRED VISION, BILATERAL: ICD-10-CM

## 2022-03-02 DIAGNOSIS — R63.4 WEIGHT LOSS: ICD-10-CM

## 2022-03-02 DIAGNOSIS — G43.109 MIGRAINE WITH AURA AND WITHOUT STATUS MIGRAINOSUS, NOT INTRACTABLE: ICD-10-CM

## 2022-03-02 PROBLEM — J31.0 RHINITIS: Status: RESOLVED | Noted: 2017-04-07 | Resolved: 2022-03-02

## 2022-03-02 PROBLEM — M25.532 LEFT WRIST PAIN: Status: RESOLVED | Noted: 2017-11-08 | Resolved: 2022-03-02

## 2022-03-02 PROBLEM — R00.2 PALPITATION: Status: RESOLVED | Noted: 2022-02-22 | Resolved: 2022-03-02

## 2022-03-02 PROBLEM — Z00.00 HEALTH CARE MAINTENANCE: Status: RESOLVED | Noted: 2017-04-07 | Resolved: 2022-03-02

## 2022-03-02 PROBLEM — R42 VERTIGO: Status: RESOLVED | Noted: 2022-02-22 | Resolved: 2022-03-02

## 2022-03-02 PROCEDURE — 99214 OFFICE O/P EST MOD 30 MIN: CPT | Performed by: FAMILY MEDICINE

## 2022-03-02 RX ORDER — LOSARTAN POTASSIUM 25 MG/1
25 TABLET ORAL DAILY
COMMUNITY
End: 2022-03-02 | Stop reason: SDUPTHER

## 2022-03-02 RX ORDER — LOSARTAN POTASSIUM 25 MG/1
25 TABLET ORAL DAILY
Qty: 30 TABLET | Refills: 1 | Status: SHIPPED | OUTPATIENT
Start: 2022-03-02 | End: 2022-03-07

## 2022-03-02 ASSESSMENT — FIBROSIS 4 INDEX: FIB4 SCORE: 0.62

## 2022-03-02 NOTE — PROGRESS NOTES
Subjective:     CC:   Chief Complaint   Patient presents with   • Establish Care       HPI:   Jensen presents today with a issue of being seen in emergency room February 2 with symptoms that he felt vertigo having blurred vision.  Patient was worked up for possible stroke.  Patient was again seen in the emergency room.  At that time his CT of the head was within normal limits patient does have a history of of atrial fib in the past and he had an echo done in 2014 that showed some mild atrial and ventricular enlargement.  Patient is has already been referred to cardiology and neurology.  Patient has a MRI scheduled at this time.  Patient also has already seen ear nose and throat that will be evaluating him for his vertigo.  Patient does state he has a lump on his right forearm that he has had for more than a year he thinks it is gotten kind of bigger.  On questioning patient about stressors patient states now he is self-employed and feels wonderful compared to how he felt when he was not self-employed.    Past Medical History:   Diagnosis Date   • A-fib (HCC)     4 years ago, one episode only, echo/stress test normal   • Geographic tongue    • Hypertension        Social History     Tobacco Use   • Smoking status: Never Smoker   • Smokeless tobacco: Never Used   Vaping Use   • Vaping Use: Never used   Substance Use Topics   • Alcohol use: Yes     Alcohol/week: 1.8 oz     Types: 1 Glasses of wine, 1 Cans of beer, 1 Shots of liquor per week     Comment: Occ   • Drug use: Not Currently     Types: Marijuana     Comment: Gummies       Current Outpatient Medications Ordered in Epic   Medication Sig Dispense Refill   • losartan (COZAAR) 25 MG Tab Take 25 mg by mouth every day.     • sumatriptan (IMITREX) 100 MG tablet Take 1/2-1 tablet po at onset of headache, may repeat dose in 2 hours if unrelieved.  Do not exceed more than 2 tablets in 24 hours. 9 Tablet 11   • meclizine (ANTIVERT) 25 MG Tab Take 1 Tablet by mouth 3  times a day as needed. 30 Tablet 0   • pantoprazole (PROTONIX) 20 MG tablet Take 20 mg by mouth every day.     • fluticasone (FLONASE) 50 MCG/ACT nasal spray Spray 1 Spray in nose 2 times a day. 3 Bottle 1     No current Epic-ordered facility-administered medications on file.       Allergies:  Diltiazem hcl, Lisinopril, and Other drug    Health Maintenance: Completed    ROS:  Gen: no fevers/chills, patient has lost weight  Eyes: no changes in vision  ENT: no sore throat, no hearing loss, no bloody nose  Pulm: no sob, no cough  CV: no chest pain, no palpitations  GI: no nausea/vomiting, no diarrhea  Neuro: no headaches, no numbness/tingling  Heme/Lymph: no easy bruising    Objective:     Exam:  /72   Pulse 66   Temp 37.7 °C (99.9 °F)   Resp 16   Ht 1.829 m (6')   Wt 106 kg (233 lb 3.2 oz)   SpO2 98%   BMI 31.63 kg/m²  Body mass index is 31.63 kg/m².    Gen: Alert and oriented, No apparent distress.  Skin: Warm and dry.  No obvious lesions.  Eyes: Sclera wnl Pupils normal in size  ENT: Canals wnl and TM are not red, no abnormal lesions noted Mallampati score is class II  Lungs: Normal effort, CTA bilaterally, no wheezes, rhonchi, or rales  CV: Regular rate and rhythm. No murmurs, rubs, or gallops.  ABD: Soft non-tender no organomegaly  Musculoskeletal: Normal gait. No extremity cyanosis, clubbing, or edema.  On palpation of right forearm patient has what feels like a lump it is soft and movable it it is about about 4 inches in diameter.  There is no skin changes noted  Neuro: Oriented to person, place and time  Psych: Mood is wnl       Labs: Would recommend getting some fasting lab work done patient states he will get that in [place]    Assessment & Plan:     46 y.o. male with the following -     1. Primary hypertension  Patient had elevated blood pressure when in the emergency room he is now on blood pressure medication his blood pressure looks at goal we will continue present meds this is a chronic  problem  - Comp Metabolic Panel; Future  - Lipid Profile; Future    2. Obesity (BMI 30-39.9)  Patient's weight is slightly decreased we will need to check his thyroid this is a chronic problem    3. Soft tissue mass  We will refer patient to surgery I assume this may be a lipoma or fibroma this is a chronic problem    4. Anxiety state  We will check his TSH this is a acute problem    5. Weight loss  We will get lab work done this is a acute problem  - CBC WITH DIFFERENTIAL; Future  - TSH; Future  - TRIIDOTHYRONINE; Future    6. Migraine with aura and without status migrainosus, not intractable  Patient to follow-up with neurology as planned.  I also recommend that he see his eye doctor since he is having some blurred vision patient states he does have an eye provider and will make appointment this is a acute problem    7. Blurred vision, bilateral  Patient to see his eye provider for this this is acute problem    Other orders  - losartan (COZAAR) 25 MG Tab; Take 25 mg by mouth every day.       Return in about 4 weeks (around 3/30/2022).    Please note that this dictation was created using voice recognition software. I have made every reasonable attempt to correct obvious errors, but I expect that there are errors of grammar and possibly content that I did not discover before finalizing the note.

## 2022-03-02 NOTE — LETTER
March 2, 2022         Patient: Jensen Donald   YOB: 1975   Date of Visit: 3/2/2022           To Whom it May Concern:    Jensen Donald was seen in my clinic on 3/2/2022.  Please excuse him from work today.    If you have any questions or concerns, please don't hesitate to call.        Sincerely,           Rosalee Vasquez M.D.  Electronically Signed

## 2022-03-09 ENCOUNTER — APPOINTMENT (OUTPATIENT)
Dept: NEUROLOGY | Facility: MEDICAL CENTER | Age: 47
End: 2022-03-09
Attending: NURSE PRACTITIONER
Payer: COMMERCIAL

## 2022-03-09 ENCOUNTER — NON-PROVIDER VISIT (OUTPATIENT)
Dept: CARDIOLOGY | Facility: MEDICAL CENTER | Age: 47
End: 2022-03-09
Payer: COMMERCIAL

## 2022-03-09 VITALS
DIASTOLIC BLOOD PRESSURE: 62 MMHG | OXYGEN SATURATION: 99 % | HEIGHT: 72 IN | HEART RATE: 76 BPM | TEMPERATURE: 98.7 F | WEIGHT: 235 LBS | RESPIRATION RATE: 14 BRPM | SYSTOLIC BLOOD PRESSURE: 120 MMHG | BODY MASS INDEX: 31.83 KG/M2

## 2022-03-09 DIAGNOSIS — R42 VERTIGO: ICD-10-CM

## 2022-03-09 DIAGNOSIS — G43.109 MIGRAINE WITH AURA AND WITHOUT STATUS MIGRAINOSUS, NOT INTRACTABLE: ICD-10-CM

## 2022-03-09 DIAGNOSIS — R00.2 PALPITATION: ICD-10-CM

## 2022-03-09 DIAGNOSIS — I49.3 PVC'S (PREMATURE VENTRICULAR CONTRACTIONS): ICD-10-CM

## 2022-03-09 DIAGNOSIS — I49.1 PREMATURE ATRIAL CONTRACTIONS: ICD-10-CM

## 2022-03-09 DIAGNOSIS — I48.91 ATRIAL FIBRILLATION, UNSPECIFIED TYPE (HCC): ICD-10-CM

## 2022-03-09 PROCEDURE — 99214 OFFICE O/P EST MOD 30 MIN: CPT | Performed by: NURSE PRACTITIONER

## 2022-03-09 PROCEDURE — 99212 OFFICE O/P EST SF 10 MIN: CPT | Performed by: NURSE PRACTITIONER

## 2022-03-09 ASSESSMENT — ENCOUNTER SYMPTOMS
NERVOUS/ANXIOUS: 1
BLURRED VISION: 0
NECK PAIN: 1
DEPRESSION: 0
HEADACHES: 1
VOMITING: 0
NAUSEA: 0
DIZZINESS: 0
COUGH: 0

## 2022-03-09 ASSESSMENT — FIBROSIS 4 INDEX: FIB4 SCORE: 0.62
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
## 2022-03-14 ENCOUNTER — TELEPHONE (OUTPATIENT)
Dept: CARDIOLOGY | Facility: MEDICAL CENTER | Age: 47
End: 2022-03-14
Payer: COMMERCIAL

## 2022-03-18 ENCOUNTER — TELEPHONE (OUTPATIENT)
Dept: CARDIOLOGY | Facility: MEDICAL CENTER | Age: 47
End: 2022-03-18
Payer: COMMERCIAL

## 2022-03-18 NOTE — TELEPHONE ENCOUNTER
LVM for pt in regards to upcoming NP appt with DA, per chart review pt was last seen in 2015 by [facility]. Per chart review most recent testing is from recent ED visit as well as recent cardiac monitor. Monitor results are in chart.    Pending call back from pt.

## 2022-03-18 NOTE — TELEPHONE ENCOUNTER
Pt has not seen a cardiologist since LA in 2015. Pt currently has a cardiac event monitor until 3-24. Pt has not had any other cardiac testing.

## 2022-03-24 ENCOUNTER — HOSPITAL ENCOUNTER (OUTPATIENT)
Dept: RADIOLOGY | Facility: MEDICAL CENTER | Age: 47
End: 2022-03-24
Attending: OTOLARYNGOLOGY
Payer: COMMERCIAL

## 2022-03-24 DIAGNOSIS — H53.8 BLURRED VISION: ICD-10-CM

## 2022-03-24 PROCEDURE — 700117 HCHG RX CONTRAST REV CODE 255: Performed by: OTOLARYNGOLOGY

## 2022-03-24 PROCEDURE — 70553 MRI BRAIN STEM W/O & W/DYE: CPT

## 2022-03-24 PROCEDURE — A9576 INJ PROHANCE MULTIPACK: HCPCS | Performed by: OTOLARYNGOLOGY

## 2022-03-24 RX ADMIN — GADOTERIDOL 20 ML: 279.3 INJECTION, SOLUTION INTRAVENOUS at 14:07

## 2022-03-25 PROCEDURE — 93268 ECG RECORD/REVIEW: CPT | Performed by: INTERNAL MEDICINE

## 2022-03-28 ENCOUNTER — HOSPITAL ENCOUNTER (OUTPATIENT)
Dept: LAB | Facility: MEDICAL CENTER | Age: 47
End: 2022-03-28
Attending: OTOLARYNGOLOGY
Payer: COMMERCIAL

## 2022-03-28 ENCOUNTER — HOSPITAL ENCOUNTER (OUTPATIENT)
Dept: LAB | Facility: MEDICAL CENTER | Age: 47
End: 2022-03-28
Attending: FAMILY MEDICINE
Payer: COMMERCIAL

## 2022-03-28 DIAGNOSIS — I10 PRIMARY HYPERTENSION: ICD-10-CM

## 2022-03-28 DIAGNOSIS — R63.4 WEIGHT LOSS: ICD-10-CM

## 2022-03-28 LAB
ALBUMIN SERPL BCP-MCNC: 4.6 G/DL (ref 3.2–4.9)
ALBUMIN/GLOB SERPL: 2.3 G/DL
ALP SERPL-CCNC: 86 U/L (ref 30–99)
ALT SERPL-CCNC: 20 U/L (ref 2–50)
ANION GAP SERPL CALC-SCNC: 10 MMOL/L (ref 7–16)
AST SERPL-CCNC: 18 U/L (ref 12–45)
BASOPHILS # BLD AUTO: 0.5 % (ref 0–1.8)
BASOPHILS # BLD: 0.02 K/UL (ref 0–0.12)
BILIRUB SERPL-MCNC: 0.7 MG/DL (ref 0.1–1.5)
BUN SERPL-MCNC: 14 MG/DL (ref 8–22)
CALCIUM SERPL-MCNC: 9.1 MG/DL (ref 8.5–10.5)
CHLORIDE SERPL-SCNC: 105 MMOL/L (ref 96–112)
CHOLEST SERPL-MCNC: 175 MG/DL (ref 100–199)
CO2 SERPL-SCNC: 25 MMOL/L (ref 20–33)
CREAT SERPL-MCNC: 0.94 MG/DL (ref 0.5–1.4)
EOSINOPHIL # BLD AUTO: 0.14 K/UL (ref 0–0.51)
EOSINOPHIL NFR BLD: 3.6 % (ref 0–6.9)
ERYTHROCYTE [DISTWIDTH] IN BLOOD BY AUTOMATED COUNT: 38.3 FL (ref 35.9–50)
FASTING STATUS PATIENT QL REPORTED: NORMAL
GFR SERPLBLD CREATININE-BSD FMLA CKD-EPI: 101 ML/MIN/1.73 M 2
GLOBULIN SER CALC-MCNC: 2 G/DL (ref 1.9–3.5)
GLUCOSE SERPL-MCNC: 82 MG/DL (ref 65–99)
HCT VFR BLD AUTO: 43.4 % (ref 42–52)
HDLC SERPL-MCNC: 42 MG/DL
HGB BLD-MCNC: 15.1 G/DL (ref 14–18)
IMM GRANULOCYTES # BLD AUTO: 0 K/UL (ref 0–0.11)
IMM GRANULOCYTES NFR BLD AUTO: 0 % (ref 0–0.9)
LDLC SERPL CALC-MCNC: 115 MG/DL
LYMPHOCYTES # BLD AUTO: 1.8 K/UL (ref 1–4.8)
LYMPHOCYTES NFR BLD: 46 % (ref 22–41)
MCH RBC QN AUTO: 30 PG (ref 27–33)
MCHC RBC AUTO-ENTMCNC: 34.8 G/DL (ref 33.7–35.3)
MCV RBC AUTO: 86.3 FL (ref 81.4–97.8)
MONOCYTES # BLD AUTO: 0.28 K/UL (ref 0–0.85)
MONOCYTES NFR BLD AUTO: 7.2 % (ref 0–13.4)
NEUTROPHILS # BLD AUTO: 1.67 K/UL (ref 1.82–7.42)
NEUTROPHILS NFR BLD: 42.7 % (ref 44–72)
NRBC # BLD AUTO: 0 K/UL
NRBC BLD-RTO: 0 /100 WBC
PLATELET # BLD AUTO: 187 K/UL (ref 164–446)
PMV BLD AUTO: 12.5 FL (ref 9–12.9)
POTASSIUM SERPL-SCNC: 6.1 MMOL/L (ref 3.6–5.5)
PROT SERPL-MCNC: 6.6 G/DL (ref 6–8.2)
RBC # BLD AUTO: 5.03 M/UL (ref 4.7–6.1)
RHEUMATOID FACT SER IA-ACNC: <10 IU/ML (ref 0–14)
SODIUM SERPL-SCNC: 140 MMOL/L (ref 135–145)
T3 SERPL-MCNC: 98.6 NG/DL (ref 60–181)
TRIGL SERPL-MCNC: 92 MG/DL (ref 0–149)
TSH SERPL DL<=0.005 MIU/L-ACNC: 3.2 UIU/ML (ref 0.38–5.33)
WBC # BLD AUTO: 3.9 K/UL (ref 4.8–10.8)

## 2022-03-28 PROCEDURE — 85025 COMPLETE CBC W/AUTO DIFF WBC: CPT

## 2022-03-28 PROCEDURE — 84480 ASSAY TRIIODOTHYRONINE (T3): CPT

## 2022-03-28 PROCEDURE — 85652 RBC SED RATE AUTOMATED: CPT

## 2022-03-28 PROCEDURE — 86038 ANTINUCLEAR ANTIBODIES: CPT

## 2022-03-28 PROCEDURE — 80053 COMPREHEN METABOLIC PANEL: CPT

## 2022-03-28 PROCEDURE — 84443 ASSAY THYROID STIM HORMONE: CPT

## 2022-03-28 PROCEDURE — 80061 LIPID PANEL: CPT

## 2022-03-28 PROCEDURE — 36415 COLL VENOUS BLD VENIPUNCTURE: CPT

## 2022-03-28 PROCEDURE — 86431 RHEUMATOID FACTOR QUANT: CPT

## 2022-03-29 ENCOUNTER — OFFICE VISIT (OUTPATIENT)
Dept: CARDIOLOGY | Facility: MEDICAL CENTER | Age: 47
End: 2022-03-29
Attending: NURSE PRACTITIONER
Payer: COMMERCIAL

## 2022-03-29 ENCOUNTER — RESEARCH ENCOUNTER (OUTPATIENT)
Dept: CARDIOLOGY | Facility: MEDICAL CENTER | Age: 47
End: 2022-03-29

## 2022-03-29 VITALS
WEIGHT: 230 LBS | RESPIRATION RATE: 14 BRPM | HEIGHT: 72 IN | SYSTOLIC BLOOD PRESSURE: 120 MMHG | HEART RATE: 70 BPM | BODY MASS INDEX: 31.15 KG/M2 | DIASTOLIC BLOOD PRESSURE: 84 MMHG | OXYGEN SATURATION: 98 %

## 2022-03-29 DIAGNOSIS — I48.0 PAROXYSMAL ATRIAL FIBRILLATION (HCC): ICD-10-CM

## 2022-03-29 DIAGNOSIS — I10 ESSENTIAL HYPERTENSION: ICD-10-CM

## 2022-03-29 DIAGNOSIS — Z00.6 RESEARCH STUDY PATIENT: ICD-10-CM

## 2022-03-29 DIAGNOSIS — I51.7 LEFT ATRIAL ENLARGEMENT: ICD-10-CM

## 2022-03-29 DIAGNOSIS — G43.109 MIGRAINE WITH AURA AND WITHOUT STATUS MIGRAINOSUS, NOT INTRACTABLE: ICD-10-CM

## 2022-03-29 LAB — ERYTHROCYTE [SEDIMENTATION RATE] IN BLOOD BY WESTERGREN METHOD: 5 MM/HOUR (ref 0–20)

## 2022-03-29 PROCEDURE — 99204 OFFICE O/P NEW MOD 45 MIN: CPT | Mod: 25 | Performed by: INTERNAL MEDICINE

## 2022-03-29 PROCEDURE — 93000 ELECTROCARDIOGRAM COMPLETE: CPT | Performed by: INTERNAL MEDICINE

## 2022-03-29 ASSESSMENT — ENCOUNTER SYMPTOMS
DECREASED APPETITE: 0
SORE THROAT: 0
HEARTBURN: 1
NIGHT SWEATS: 0
BLOATING: 0
IRREGULAR HEARTBEAT: 0
PND: 0
NERVOUS/ANXIOUS: 1
CONSTIPATION: 0
DOUBLE VISION: 0
PHOTOPHOBIA: 1
DIAPHORESIS: 0
LIGHT-HEADEDNESS: 0
DIARRHEA: 0
MYALGIAS: 0
INSOMNIA: 1
PARESTHESIAS: 0
HEADACHES: 1
DIZZINESS: 0
SLEEP DISTURBANCES DUE TO BREATHING: 0
BLURRED VISION: 1
WEAKNESS: 0
COUGH: 0
EXCESSIVE DAYTIME SLEEPINESS: 0
DYSPNEA ON EXERTION: 0
VOMITING: 0
FLANK PAIN: 0
NUMBNESS: 0
SHORTNESS OF BREATH: 0
SYNCOPE: 0
NEAR-SYNCOPE: 0
PALPITATIONS: 1
BACK PAIN: 0
WHEEZING: 0
LOSS OF BALANCE: 0
FEVER: 0
NAUSEA: 1
FALLS: 0
ORTHOPNEA: 0

## 2022-03-29 ASSESSMENT — FIBROSIS 4 INDEX: FIB4 SCORE: 0.99

## 2022-03-29 NOTE — RESEARCH NOTE
[employer] [place] [employer] enrollment complete. Saliva sample collected onsite. MEYER Identified consented and enrolled.

## 2022-03-29 NOTE — PROGRESS NOTES
Cardiology Initial Consultation Note    Date of note:    3/29/2022    Primary Care Provider: Rosalee Vasquez M.D.  Referring Provider: Angeles Mcdaniels A.P.R.*     Patient Name: Jensen Donald   YOB: 1975  MRN:              [id number]    Chief Complaint   Patient presents with   • Hypertension   • Hyperlipidemia     NP Dx: Mild hyperlipidemia   • Atrial Fibrillation       History of Present Illness: Mr. Jensen Donald is a 46 y.o. male whose current medical problems include HTN and HLD who is here for cardiac consultation for Afib.    Patient states that he had one episode of Afib at the age of 34 while he was in [place] with associated palpitations.  Was a heavy weight  at that time and was using synthetic steroids.  No episodes since then.  In 2014, he was diagnosed with hypertension and saw Dr. Fink in cardiology clinic.  Underwent echocardiogram which showed mildly dilated left atrium otherwise normal echocardiogram.    He was in his usual state of health until 2/2/2022 when he presented to [facility] ED with acute onset of dizziness.  Stroke was ruled out and was diagnosed with migraine with aura with aura being vertigo and blurred vision.  Has been followed up in our neurology clinic and underwent 2 separate Zio patch monitors which were unremarkable.  Due to history of A. fib, he is referred to cardiology for further evaluation and treatment.    In terms of physical activity, is quite active and exercise on a regular basis.  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  Has occasional episodes of fluttering sensation in his chest.  Also reports intermittent episodes of chest discomfort which he believes is due to dropping weight on his chest.    Cardiovascular Risk Factors:  1. Smoking status: Never smoker  2. Type II Diabetes Mellitus: No  Lab Results   Component Value Date/Time    HBA1C 5.2 11/12/2014 08:05 AM    HBA1C 5.0 11/15/2013 06:55 AM     3.  Hypertension: Yes  4. Dyslipidemia:    Cholesterol,Tot   Date Value Ref Range Status   03/28/2022 175 100 - 199 mg/dL Final     LDL   Date Value Ref Range Status   03/28/2022 115 (H) <100 mg/dL Final     HDL   Date Value Ref Range Status   03/28/2022 42 >=40 mg/dL Final     Triglycerides   Date Value Ref Range Status   03/28/2022 92 0 - 149 mg/dL Final     5. Family history of early Coronary Artery Disease in a first degree relative (Male less than 55 years of age; Female less than 65 years of age): Denies  6.  Obesity and/or Metabolic Syndrome: BMI 31  7. Sedentary lifestyle: No    Review of Systems   Constitutional: Negative for decreased appetite, diaphoresis, fever, malaise/fatigue and night sweats.   HENT: Positive for hearing loss and tinnitus. Negative for congestion and sore throat.    Eyes: Positive for blurred vision and photophobia. Negative for double vision.   Cardiovascular: Positive for palpitations. Negative for chest pain, cyanosis, dyspnea on exertion, irregular heartbeat, leg swelling, near-syncope, orthopnea, paroxysmal nocturnal dyspnea and syncope.   Respiratory: Negative for cough, shortness of breath, sleep disturbances due to breathing and wheezing.    Endocrine: Negative for cold intolerance and heat intolerance.   Musculoskeletal: Negative for back pain, falls and myalgias.   Gastrointestinal: Positive for heartburn and nausea. Negative for bloating, constipation, diarrhea and vomiting.   Genitourinary: Negative for dysuria and flank pain.   Neurological: Positive for headaches. Negative for excessive daytime sleepiness, dizziness, light-headedness, loss of balance, numbness, paresthesias and weakness.   Psychiatric/Behavioral: The patient has insomnia and is nervous/anxious.          Past Medical History:   Diagnosis Date   • A-fib (HCC)     4 years ago, one episode only, echo/stress test normal   • Geographic tongue    • Hypertension          History reviewed. No pertinent surgical  history.      Current Outpatient Medications   Medication Sig Dispense Refill   • losartan (COZAAR) 25 MG Tab TAKE 1 TABLET BY MOUTH EVERY DAY 90 Tablet 0   • sumatriptan (IMITREX) 100 MG tablet Take 1/2-1 tablet po at onset of headache, may repeat dose in 2 hours if unrelieved.  Do not exceed more than 2 tablets in 24 hours. 9 Tablet 11   • meclizine (ANTIVERT) 25 MG Tab Take 1 Tablet by mouth 3 times a day as needed. 30 Tablet 0   • pantoprazole (PROTONIX) 20 MG tablet Take 20 mg by mouth every day.     • fluticasone (FLONASE) 50 MCG/ACT nasal spray Spray 1 Spray in nose 2 times a day. 3 Bottle 1     No current facility-administered medications for this visit.         Allergies   Allergen Reactions   • Diltiazem Hcl      Other reaction(s): Headache   • Lisinopril      cough   • Other Drug      Can't remember.  Few yrs ago. Anti-anxiety pill         Family History   Problem Relation Age of Onset   • Diabetes Mother    • Lung Disease Son          Social History     Socioeconomic History   • Marital status:      Spouse name: Not on file   • Number of children: Not on file   • Years of education: Not on file   • Highest education level: Not on file   Occupational History   • Not on file   Tobacco Use   • Smoking status: Never Smoker   • Smokeless tobacco: Never Used   Vaping Use   • Vaping Use: Never used   Substance and Sexual Activity   • Alcohol use: Yes     Alcohol/week: 1.8 oz     Types: 1 Glasses of wine, 1 Cans of beer, 1 Shots of liquor per week     Comment: Occ   • Drug use: Not Currently     Types: Marijuana     Comment: Gummies   • Sexual activity: Yes     Partners: Female     Comment: wife, Fern   Other Topics Concern   • Not on file   Social History Narrative   • Not on file     Social Determinants of Health     Financial Resource Strain: Not on file   Food Insecurity: Not on file   Transportation Needs: Not on file   Physical Activity: Not on file   Stress: Not on file   Social Connections: Not on  file   Intimate Partner Violence: Not on file   Housing Stability: Not on file         Physical Exam:  Ambulatory Vitals  /84 (BP Location: Left arm, Patient Position: Sitting, BP Cuff Size: Adult)   Pulse 70   Resp 14   Ht 1.829 m (6')   Wt 104 kg (230 lb)   SpO2 98%    Oxygen Therapy:  Pulse Oximetry: 98 %  BP Readings from Last 4 Encounters:   03/29/22 120/84   03/09/22 120/62   03/02/22 124/72   02/22/22 130/82       Weight/BMI: Body mass index is 31.19 kg/m².  Wt Readings from Last 4 Encounters:   03/29/22 104 kg (230 lb)   03/09/22 107 kg (235 lb)   03/02/22 106 kg (233 lb 3.2 oz)   02/22/22 110 kg (242 lb 7.7 oz)     General: No acute distress. Well nourished.  HEENT: EOM grossly intact, no scleral icterus, no pharyngeal erythema.   Neck:  Trachea midline, no visible masses  CVS: RRR. No JVD at 90  Resp: Normal respiratory effort. Normal appearing chest.  Abdomen: Not overtly obese.  MSK/Ext: No clubbing or cyanosis.  Skin: Dry appearing, no rashes.  Neurological: CN III-XII grossly intact. No focal deficits.   Psych: A&O x 3, appropriate affect, good judgement    Lab Data Review:  Lab Results   Component Value Date/Time    CHOLSTRLTOT 175 03/28/2022 06:14 AM     (H) 03/28/2022 06:14 AM    HDL 42 03/28/2022 06:14 AM    TRIGLYCERIDE 92 03/28/2022 06:14 AM       Lab Results   Component Value Date/Time    SODIUM 140 03/28/2022 06:14 AM    POTASSIUM 6.1 (H) 03/28/2022 06:14 AM    CHLORIDE 105 03/28/2022 06:14 AM    CO2 25 03/28/2022 06:14 AM    GLUCOSE 82 03/28/2022 06:14 AM    BUN 14 03/28/2022 06:14 AM    CREATININE 0.94 03/28/2022 06:14 AM     Lab Results   Component Value Date/Time    ALKPHOSPHAT 86 03/28/2022 06:14 AM    ASTSGOT 18 03/28/2022 06:14 AM    ALTSGPT 20 03/28/2022 06:14 AM    TBILIRUBIN 0.7 03/28/2022 06:14 AM      Lab Results   Component Value Date/Time    WBC 3.9 (L) 03/28/2022 06:14 AM     Lab Results   Component Value Date/Time    HBA1C 5.2 11/12/2014 08:05 AM    HBA1C 5.0  11/15/2013 06:55 AM         Cardiac Imaging and Procedures Review:    EKG dated 3/29/2022: My personal interpretation is sinus rhythm    Echo dated 12/30/2014:   CONCLUSIONS   Normal left ventricular chamber size. Normal left ventricular systolic   function. Normal left ventricular wall thickness. Left ventricular   ejection fraction is 55% to 60%.   Trace mitral regurgitation.   Tricuspid valve opens normally. Mild tricuspid regurgitation. Right   ventricular systolic pressure is estimated to be  35 mmHg.   No pericardial effusion seen.   Normal aortic root diameter 3.2 cm.   No prior study for comparison.     Holter Monitor (3/14/2022):   1.  No significant tiffany or tachyarrhythmias were detected.   2.  Rare PACs and rare PVCs were detected.   3.  There were 6 patient triggered events recorded.  Most patient events correlated with normal sinus rhythm.  One event possibly correlated with a PVC, although this may have been artifact.      Radiology test Review:  CXR: No acute cardiopulmonary abnormality noted.    CTA neck (2/1/2022): IMPRESSION:  1.  CT angiogram of the neck within normal limits.      Assessment & Plan     1. Paroxysmal atrial fibrillation (HCC)  EKG    EC-ECHOCARDIOGRAM COMPLETE W/O CONT    Cardiac Stress Test Treadmill Only    CL-IMPLANTABLE LOOP RECORDER   2. Essential hypertension  EC-ECHOCARDIOGRAM COMPLETE W/O CONT   3. Migraine with aura and without status migrainosus, not intractable     4. Left atrial enlargement  EC-ECHOCARDIOGRAM COMPLETE W/O CONT         Shared Medical Decision Making:  Obtain transthoracic echocardiogram to evaluate underlying cardiac structure and function.  Prior echocardiogram showed a large left atrium.  Monitor for any structural changes.    Given history of paroxysmal atrial fibrillation, obtain exercise treadmill stress test to rule out exercise-induced arrhythmia or obstructive CAD.    Patient underwent 2 separate 15 days cardiac event monitor which did not show  evidence of Afib/flutter.  Patient had one episode of atrial fibrillation several years ago and now is experiencing random episodes of dizziness and new onset migraine with aura.  He is quite concerned about paroxysmal episodes of A. fib.  Discussed with the patient implantation of loop recorder for long-term monitoring to which he is in agreement with.  Discussed risk versus benefits involved with the procedure which include but are not limited to infection, bleeding or bruising, pain or allergic skin reaction.  After shared decision making, patient is willing to proceed.  Understands that he will be initiated on oral anticoagulation if there is evidence of Afib given [id number] score of 1 for hypertension.    Blood pressure under well control.  Continue losartan 25 mg daily.      All of patient's excellent questions were answered to the best of my knowledge and to his satisfaction.  It was a pleasure seeing Mr. Jensen Donald in my clinic today. Return in about 6 months (around 9/29/2022). Patient is aware to call the cardiology clinic with any questions or concerns.      Jae Flowers MD  [facility]  [facility], [facility]  [address] [address]  Phone: [phone]  Fax: [phone]    Please note that this dictation was created using voice recognition software. I have made every reasonable attempt to correct obvious errors, but it is possible there are errors of grammar and possibly content that I did not discover before finalizing the note.

## 2022-03-30 ENCOUNTER — TELEPHONE (OUTPATIENT)
Dept: CARDIOLOGY | Facility: MEDICAL CENTER | Age: 47
End: 2022-03-30

## 2022-03-30 ENCOUNTER — OFFICE VISIT (OUTPATIENT)
Dept: MEDICAL GROUP | Facility: PHYSICIAN GROUP | Age: 47
End: 2022-03-30
Payer: COMMERCIAL

## 2022-03-30 ENCOUNTER — HOSPITAL ENCOUNTER (OUTPATIENT)
Dept: LAB | Facility: MEDICAL CENTER | Age: 47
End: 2022-03-30
Attending: FAMILY MEDICINE
Payer: COMMERCIAL

## 2022-03-30 ENCOUNTER — HOSPITAL ENCOUNTER (OUTPATIENT)
Dept: CARDIOLOGY | Facility: MEDICAL CENTER | Age: 47
End: 2022-03-30
Attending: INTERNAL MEDICINE
Payer: COMMERCIAL

## 2022-03-30 VITALS
BODY MASS INDEX: 31.1 KG/M2 | HEIGHT: 72 IN | OXYGEN SATURATION: 100 % | WEIGHT: 229.6 LBS | RESPIRATION RATE: 18 BRPM | DIASTOLIC BLOOD PRESSURE: 76 MMHG | SYSTOLIC BLOOD PRESSURE: 126 MMHG | TEMPERATURE: 98.8 F | HEART RATE: 60 BPM

## 2022-03-30 DIAGNOSIS — R07.89 CHEST WALL DISCOMFORT: ICD-10-CM

## 2022-03-30 DIAGNOSIS — E78.00 ELEVATED LDL CHOLESTEROL LEVEL: ICD-10-CM

## 2022-03-30 DIAGNOSIS — E87.5 HYPERKALEMIA: ICD-10-CM

## 2022-03-30 DIAGNOSIS — Z23 NEED FOR VACCINATION: ICD-10-CM

## 2022-03-30 DIAGNOSIS — I48.0 PAROXYSMAL ATRIAL FIBRILLATION (HCC): ICD-10-CM

## 2022-03-30 DIAGNOSIS — I10 ESSENTIAL HYPERTENSION: ICD-10-CM

## 2022-03-30 DIAGNOSIS — I51.7 LEFT ATRIAL ENLARGEMENT: ICD-10-CM

## 2022-03-30 DIAGNOSIS — D72.819 LEUKOPENIA, UNSPECIFIED TYPE: ICD-10-CM

## 2022-03-30 LAB
ALBUMIN SERPL BCP-MCNC: 4.6 G/DL (ref 3.2–4.9)
ALBUMIN/GLOB SERPL: 2.2 G/DL
ALP SERPL-CCNC: 80 U/L (ref 30–99)
ALT SERPL-CCNC: 19 U/L (ref 2–50)
ANION GAP SERPL CALC-SCNC: 12 MMOL/L (ref 7–16)
AST SERPL-CCNC: 16 U/L (ref 12–45)
BILIRUB SERPL-MCNC: 0.4 MG/DL (ref 0.1–1.5)
BUN SERPL-MCNC: 13 MG/DL (ref 8–22)
CALCIUM SERPL-MCNC: 9.3 MG/DL (ref 8.5–10.5)
CHLORIDE SERPL-SCNC: 104 MMOL/L (ref 96–112)
CO2 SERPL-SCNC: 23 MMOL/L (ref 20–33)
CREAT SERPL-MCNC: 0.84 MG/DL (ref 0.5–1.4)
EKG IMPRESSION: NORMAL
GFR SERPLBLD CREATININE-BSD FMLA CKD-EPI: 108 ML/MIN/1.73 M 2
GLOBULIN SER CALC-MCNC: 2.1 G/DL (ref 1.9–3.5)
GLUCOSE SERPL-MCNC: 90 MG/DL (ref 65–99)
LV EJECT FRACT  99904: 60
LV EJECT FRACT MOD 2C 99903: 60.38
LV EJECT FRACT MOD 4C 99902: 61.91
LV EJECT FRACT MOD BP 99901: 61.32
NUCLEAR IGG SER QL IA: NORMAL
POTASSIUM SERPL-SCNC: 4.5 MMOL/L (ref 3.6–5.5)
PROT SERPL-MCNC: 6.7 G/DL (ref 6–8.2)
SODIUM SERPL-SCNC: 139 MMOL/L (ref 135–145)

## 2022-03-30 PROCEDURE — 93306 TTE W/DOPPLER COMPLETE: CPT

## 2022-03-30 PROCEDURE — 80053 COMPREHEN METABOLIC PANEL: CPT

## 2022-03-30 PROCEDURE — 99214 OFFICE O/P EST MOD 30 MIN: CPT | Mod: 25 | Performed by: FAMILY MEDICINE

## 2022-03-30 PROCEDURE — 90715 TDAP VACCINE 7 YRS/> IM: CPT | Performed by: FAMILY MEDICINE

## 2022-03-30 PROCEDURE — 93306 TTE W/DOPPLER COMPLETE: CPT | Mod: 26 | Performed by: INTERNAL MEDICINE

## 2022-03-30 PROCEDURE — 36415 COLL VENOUS BLD VENIPUNCTURE: CPT

## 2022-03-30 PROCEDURE — 90471 IMMUNIZATION ADMIN: CPT | Performed by: FAMILY MEDICINE

## 2022-03-30 ASSESSMENT — FIBROSIS 4 INDEX: FIB4 SCORE: 0.99

## 2022-03-30 NOTE — PROGRESS NOTES
Subjective:     CC:   Chief Complaint   Patient presents with   • Follow-Up       HPI:   Jensen presents today for follow-up.  Patient did see cardiology and neurology.  He does have what sounds to be an echo scheduled for today.  Patient states the cardiologist was talking to him about installing a loop recorder.  Patient was given the option patient is very concerned he does not want to have another episode of A. fib.  Patient states the neurologist felt like everything was okay.  Patient is also having some chest wall pain he notices when he exercises sometimes his chest wall is tender when he presses on it.  Patient is self-employed right now as a  so he is doing a lot of overhead work.    Past Medical History:   Diagnosis Date   • A-fib (HCC)     4 years ago, one episode only, echo/stress test normal   • Geographic tongue    • Hypertension        Social History     Tobacco Use   • Smoking status: Never Smoker   • Smokeless tobacco: Never Used   Vaping Use   • Vaping Use: Never used   Substance Use Topics   • Alcohol use: Yes     Alcohol/week: 1.8 oz     Types: 1 Glasses of wine, 1 Cans of beer, 1 Shots of liquor per week     Comment: Occ   • Drug use: Not Currently     Types: Marijuana     Comment: Gummies       Current Outpatient Medications Ordered in Epic   Medication Sig Dispense Refill   • losartan (COZAAR) 25 MG Tab TAKE 1 TABLET BY MOUTH EVERY DAY 90 Tablet 0   • sumatriptan (IMITREX) 100 MG tablet Take 1/2-1 tablet po at onset of headache, may repeat dose in 2 hours if unrelieved.  Do not exceed more than 2 tablets in 24 hours. 9 Tablet 11   • meclizine (ANTIVERT) 25 MG Tab Take 1 Tablet by mouth 3 times a day as needed. 30 Tablet 0   • pantoprazole (PROTONIX) 20 MG tablet Take 20 mg by mouth every day.     • fluticasone (FLONASE) 50 MCG/ACT nasal spray Spray 1 Spray in nose 2 times a day. 3 Bottle 1     No current Epic-ordered facility-administered medications on file.        Allergies:  Diltiazem hcl, Lisinopril, and Other drug    Health Maintenance: Completed    ROS:  Gen: no fevers/chills, no changes in weight  Eyes: no changes in vision  ENT: no sore throat, no hearing loss, no bloody nose  Pulm: no sob, no cough  CV: no chest pain, no palpitations  GI: no nausea/vomiting, no diarrhea  : no dysuria  MSk: no myalgias  Skin: no rash  Neuro: no headaches, no numbness/tingling  Heme/Lymph: no easy bruising    Objective:     Exam:  /76   Pulse 60   Temp 37.1 °C (98.8 °F)   Resp 18   Ht 1.829 m (6')   Wt 104 kg (229 lb 9.6 oz)   SpO2 100%   BMI 31.14 kg/m²  Body mass index is 31.14 kg/m².    Gen: Alert and oriented, No apparent distress.  Skin: Warm and dry.  No obvious lesions.  Eyes: Sclera wnl Pupils normal in size  Musculoskeletal: Normal gait. No extremity cyanosis, clubbing, or edema.  Neuro: Oriented to person, place and time  Psych: Mood is wnl         Assessment & Plan:     46 y.o. male with the following -     1. Hyperkalemia  Patient's potassium slightly elevated this could possibly be lab error since the other 2 potassiums that he has had less than a year ago are all within normal limits would recommend just repeating it at this time this is a acute problem  - Comp Metabolic Panel; Future    2. Chest wall discomfort  Patient is being evaluated by cardiology this is chest wall pain would recommend possibly seeing physical therapy patient is doing a lot of weightlifting and exercising on wondering if maybe he is over utilizing some parts of his body and that is why he is getting this discomfort.  We will write a physical therapy referral this is a chronic problem    3. Need for vaccination  Patient is interested in getting  his tetanus shot done  - Tdap Vaccine =>6YO IM    4. Elevated LDL cholesterol level  Patient's LDL is 115 his HDL is 42 recommend increase exercise but would advise patient to make sure he gets cleared by cardiology patient at this point  is  doing rolling exercises.  We will repeat his lipid panel in about 4 months this is a chronic problem    5. Leukopenia, unspecified type  Patient has had slightly low white blood cell count in the past we will just repeat this again it is not decreasing any further.  This is a chronic problem       Return in about 4 months (around 7/30/2022).    Please note that this dictation was created using voice recognition software. I have made every reasonable attempt to correct obvious errors, but I expect that there are errors of grammar and possibly content that I did not discover before finalizing the note.

## 2022-03-30 NOTE — TELEPHONE ENCOUNTER
----- Message from Humphrey Luke sent at 3/30/2022  9:56 AM PDT -----  Regarding: Loop recorder    ----- Message -----  From: Audra Vaqsuez R.N.  Sent: 3/29/2022   5:07 PM PDT  To: Audra Vaughn R.N. FYI per DA  Thank you    ----- Message -----  From: aJe Flowers M.D.  Sent: 3/29/2022   4:52 PM PDT  To: Audra Vasquez R.N.    Ordered loop recorder for this patient.  Thanks.

## 2022-03-30 NOTE — TELEPHONE ENCOUNTER
Patient scheduled for loop insert on 4-8-22 with Dr. Roberts. Patient has been instructed to check in at 9:00 for 11:00 case time. Message sent to cristal Veliz with [place] notified.

## 2022-03-31 ENCOUNTER — HOSPITAL ENCOUNTER (OUTPATIENT)
Facility: MEDICAL CENTER | Age: 47
End: 2022-03-31
Attending: INTERNAL MEDICINE | Admitting: INTERNAL MEDICINE
Payer: COMMERCIAL

## 2022-04-01 ENCOUNTER — TELEPHONE (OUTPATIENT)
Dept: CARDIOLOGY | Facility: MEDICAL CENTER | Age: 47
End: 2022-04-01
Payer: COMMERCIAL

## 2022-04-05 ENCOUNTER — TELEPHONE (OUTPATIENT)
Dept: CARDIOLOGY | Facility: MEDICAL CENTER | Age: 47
End: 2022-04-05
Payer: COMMERCIAL

## 2022-04-05 NOTE — TELEPHONE ENCOUNTER
KAITLYNN ludwig/[facility] and is requesting that the office reach out to the patient to complete her pre-admit for her upcoming procedure on 4/8. Attempted to call patient to transfer to [place], no answer.    Ext: [phone] opt 2 then opt 1    Please advise.    Thank you,  Adis VALENTE

## 2022-04-06 PROCEDURE — 93268 ECG RECORD/REVIEW: CPT | Performed by: INTERNAL MEDICINE

## 2022-04-06 NOTE — TELEPHONE ENCOUNTER
Recvd voice message from Fern (patient's wife) requesting to cancel this procedure due to this patient's work schedule. Called and LM on patient's voicemail letting him know I will cancel this procedure at this time. Requested a call back when he is ready to reschedule.    Cancelled case with Tamika in cath lab scheduling and Shantal with [facility].

## 2022-04-06 NOTE — TELEPHONE ENCOUNTER
Called pt [phone]  Relayed information from [facility]. Pt seemed confused as he states he has not received any calls from [facility]. Provided phone number [phone] opt 2 then opt 1. Pt states he will call [facility] now.

## 2022-04-08 ENCOUNTER — APPOINTMENT (OUTPATIENT)
Dept: CARDIOLOGY | Facility: MEDICAL CENTER | Age: 47
End: 2022-04-08
Attending: INTERNAL MEDICINE
Payer: COMMERCIAL

## 2022-04-19 ENCOUNTER — APPOINTMENT (OUTPATIENT)
Dept: NEUROLOGY | Facility: MEDICAL CENTER | Age: 47
End: 2022-04-19
Attending: NURSE PRACTITIONER
Payer: COMMERCIAL

## 2022-05-03 ENCOUNTER — OFFICE VISIT (OUTPATIENT)
Dept: NEUROLOGY | Facility: MEDICAL CENTER | Age: 47
End: 2022-05-03
Attending: NURSE PRACTITIONER
Payer: COMMERCIAL

## 2022-05-03 VITALS
TEMPERATURE: 98.2 F | HEART RATE: 67 BPM | HEIGHT: 72 IN | OXYGEN SATURATION: 99 % | DIASTOLIC BLOOD PRESSURE: 72 MMHG | RESPIRATION RATE: 14 BRPM | SYSTOLIC BLOOD PRESSURE: 120 MMHG | BODY MASS INDEX: 29.56 KG/M2 | WEIGHT: 218.26 LBS

## 2022-05-03 DIAGNOSIS — G43.109 MIGRAINE WITH AURA AND WITHOUT STATUS MIGRAINOSUS, NOT INTRACTABLE: ICD-10-CM

## 2022-05-03 DIAGNOSIS — R42 VERTIGO: ICD-10-CM

## 2022-05-03 PROCEDURE — 99214 OFFICE O/P EST MOD 30 MIN: CPT | Performed by: NURSE PRACTITIONER

## 2022-05-03 PROCEDURE — 99211 OFF/OP EST MAY X REQ PHY/QHP: CPT | Performed by: NURSE PRACTITIONER

## 2022-05-03 ASSESSMENT — FIBROSIS 4 INDEX: FIB4 SCORE: 0.9

## 2022-05-03 ASSESSMENT — ENCOUNTER SYMPTOMS
DOUBLE VISION: 0
NERVOUS/ANXIOUS: 0
FOCAL WEAKNESS: 0
DEPRESSION: 0
PALPITATIONS: 0
HEADACHES: 0
COUGH: 0
DIZZINESS: 0
SINUS PAIN: 0
TREMORS: 0
NECK PAIN: 1
FEVER: 0
BLURRED VISION: 0
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
## 2022-05-11 DIAGNOSIS — R07.89 CHEST WALL DISCOMFORT: ICD-10-CM

## 2022-05-17 LAB
APOB+LDLR+PCSK9 GENE MUT ANL BLD/T: NOT DETECTED
BRCA1+BRCA2 DEL+DUP + FULL MUT ANL BLD/T: NOT DETECTED
MLH1+MSH2+MSH6+PMS2 GN DEL+DUP+FUL M: NOT DETECTED

## 2022-07-05 ENCOUNTER — HOSPITAL ENCOUNTER (OUTPATIENT)
Dept: RADIOLOGY | Facility: MEDICAL CENTER | Age: 47
End: 2022-07-05
Attending: INTERNAL MEDICINE
Payer: COMMERCIAL

## 2022-07-05 PROCEDURE — 93017 CV STRESS TEST TRACING ONLY: CPT

## 2022-07-06 ENCOUNTER — PATIENT MESSAGE (OUTPATIENT)
Dept: CARDIOLOGY | Facility: MEDICAL CENTER | Age: 47
End: 2022-07-06

## 2022-07-06 ENCOUNTER — TELEPHONE (OUTPATIENT)
Dept: CARDIOLOGY | Facility: MEDICAL CENTER | Age: 47
End: 2022-07-06

## 2022-07-06 DIAGNOSIS — R94.39 ABNORMAL NUCLEAR STRESS TEST: ICD-10-CM

## 2022-07-06 PROCEDURE — 93018 CV STRESS TEST I&R ONLY: CPT | Performed by: INTERNAL MEDICINE

## 2022-07-06 NOTE — TELEPHONE ENCOUNTER
----- Message from Jae Flowers M.D. sent at 7/6/2022  2:58 PM PDT -----  Please let the patient know that the stress test did show some changes which can represent blockages but this might be false positive.  In order to further assess, would recommend proceeding with either nuclear cardiac stress test or coronary CTA.  Nuclear stress test has high amount of radiation but it might be a while before he can get scheduled for coronary CTA.  Okay with either option based on patient's preference.  Thanks.

## 2022-07-12 ENCOUNTER — TELEPHONE (OUTPATIENT)
Dept: CARDIOLOGY | Facility: MEDICAL CENTER | Age: 47
End: 2022-07-12
Payer: COMMERCIAL

## 2022-07-12 NOTE — TELEPHONE ENCOUNTER
----- Message from Talita Joaquin R.N. sent at 7/11/2022  4:59 PM PDT -----    ----- Message -----  From: Alex Mcdowell  Sent: 7/11/2022  10:37 AM PDT  To: Audra Vasquez R.N.    Good morning,    Can we please get the low dose radiation form that is needed prior to scheduling this patient's 3D heart imaging?    Thank you!

## 2022-07-12 NOTE — TELEPHONE ENCOUNTER
Low dose radiation form filled out, awaiting DA Signature.     Left on Audra SO desk for signature.

## 2022-07-12 NOTE — TELEPHONE ENCOUNTER
See note from 07/08/22.     Low dose radiation form in media tab, re-faxed to imaging.   Received fax confirmation, scanned into [employer].

## 2022-07-13 ENCOUNTER — TELEPHONE (OUTPATIENT)
Dept: CARDIOLOGY | Facility: MEDICAL CENTER | Age: 47
End: 2022-07-13
Payer: COMMERCIAL

## 2022-07-13 NOTE — TELEPHONE ENCOUNTER
----- Message from Alex Mcdowell sent at 7/11/2022 10:36 AM PDT -----  Good morning,    Can we please get the low dose radiation form that is needed prior to scheduling this patient's 3D heart imaging?    Thank you!

## 2022-07-26 ENCOUNTER — APPOINTMENT (OUTPATIENT)
Dept: PHYSICAL THERAPY | Facility: REHABILITATION | Age: 47
End: 2022-07-26
Attending: FAMILY MEDICINE
Payer: COMMERCIAL

## 2022-07-28 ENCOUNTER — APPOINTMENT (OUTPATIENT)
Dept: PHYSICAL THERAPY | Facility: REHABILITATION | Age: 47
End: 2022-07-28
Attending: FAMILY MEDICINE
Payer: COMMERCIAL

## 2022-08-02 ENCOUNTER — APPOINTMENT (OUTPATIENT)
Dept: PHYSICAL THERAPY | Facility: REHABILITATION | Age: 47
End: 2022-08-02
Attending: FAMILY MEDICINE
Payer: COMMERCIAL

## 2022-08-04 ENCOUNTER — APPOINTMENT (OUTPATIENT)
Dept: PHYSICAL THERAPY | Facility: REHABILITATION | Age: 47
End: 2022-08-04
Attending: FAMILY MEDICINE
Payer: COMMERCIAL

## 2022-08-08 ENCOUNTER — APPOINTMENT (OUTPATIENT)
Dept: PHYSICAL THERAPY | Facility: REHABILITATION | Age: 47
End: 2022-08-08
Attending: FAMILY MEDICINE
Payer: COMMERCIAL

## 2022-08-11 ENCOUNTER — APPOINTMENT (OUTPATIENT)
Dept: PHYSICAL THERAPY | Facility: REHABILITATION | Age: 47
End: 2022-08-11
Attending: FAMILY MEDICINE
Payer: COMMERCIAL

## 2022-08-15 ENCOUNTER — APPOINTMENT (OUTPATIENT)
Dept: PHYSICAL THERAPY | Facility: REHABILITATION | Age: 47
End: 2022-08-15
Attending: FAMILY MEDICINE
Payer: COMMERCIAL

## 2022-08-17 ENCOUNTER — APPOINTMENT (OUTPATIENT)
Dept: PHYSICAL THERAPY | Facility: REHABILITATION | Age: 47
End: 2022-08-17
Attending: FAMILY MEDICINE
Payer: COMMERCIAL

## 2022-10-03 ENCOUNTER — TELEPHONE (OUTPATIENT)
Dept: CARDIOLOGY | Facility: MEDICAL CENTER | Age: 47
End: 2022-10-03
Payer: COMMERCIAL

## 2022-10-03 DIAGNOSIS — I10 ESSENTIAL HYPERTENSION: ICD-10-CM

## 2022-10-03 RX ORDER — LOSARTAN POTASSIUM 25 MG/1
25 TABLET ORAL DAILY
Qty: 90 TABLET | Refills: 1 | Status: SHIPPED | OUTPATIENT
Start: 2022-10-03 | End: 2022-10-25

## 2022-10-03 NOTE — TELEPHONE ENCOUNTER
"DA    Caller: Jensen Donald    Topic/issue: CTA IMAGING     Patient is requesting a STAT order for this procedure. Patient is extremely concerned about this heart condition and states that \"this is not a joke to me.\" He would like to know what can be done. He has been waiting for this appointment for 3 mnths and received a call stating that his appointment was nicolas for January. Please advise.    Thank you,  Adis VALENTE    Callback Number: [phone] (home)       "

## 2022-10-03 NOTE — TELEPHONE ENCOUNTER
Appointment was scheduled for 10/05/22, rescheduled for 01/04/23.    Routed to  for follow up information.

## 2022-10-03 NOTE — TELEPHONE ENCOUNTER
MEDICATION REFILL : DA    Caller: Jensen Donald    Medication Name and Dosage:     LOSARTAN 25MG     Did patient contact pharmacy (If no, please call pharmacy first): YES    Medication amount left: NONE    Preferred Pharmacy:     [id number] - [place], [place] - [address]    Other questions (Topic): NONE    Callback Number (Will only call for issues): [phone] (home)

## 2022-10-10 ENCOUNTER — OFFICE VISIT (OUTPATIENT)
Dept: CARDIOLOGY | Facility: MEDICAL CENTER | Age: 47
End: 2022-10-10
Payer: COMMERCIAL

## 2022-10-10 VITALS
SYSTOLIC BLOOD PRESSURE: 136 MMHG | OXYGEN SATURATION: 100 % | HEART RATE: 76 BPM | DIASTOLIC BLOOD PRESSURE: 80 MMHG | HEIGHT: 72 IN | BODY MASS INDEX: 29.53 KG/M2 | RESPIRATION RATE: 16 BRPM | WEIGHT: 218 LBS

## 2022-10-10 DIAGNOSIS — I10 ESSENTIAL HYPERTENSION: ICD-10-CM

## 2022-10-10 DIAGNOSIS — I48.0 PAROXYSMAL ATRIAL FIBRILLATION (HCC): ICD-10-CM

## 2022-10-10 LAB — EKG IMPRESSION: NORMAL

## 2022-10-10 PROCEDURE — 99214 OFFICE O/P EST MOD 30 MIN: CPT | Performed by: INTERNAL MEDICINE

## 2022-10-10 PROCEDURE — 93000 ELECTROCARDIOGRAM COMPLETE: CPT | Performed by: INTERNAL MEDICINE

## 2022-10-10 ASSESSMENT — FIBROSIS 4 INDEX: FIB4 SCORE: 0.92

## 2022-10-10 NOTE — PROGRESS NOTES
Cardiology Follow-up Consultation Note    Date of note:    10/10/2022    Primary Care Provider: Rosalee Vasquez M.D.    Name:             Jensen Donald   YOB: 1975  MRN:               [id number]    Chief Complaint   Patient presents with    Atrial Fibrillation     F/V Dx: Paroxysmal atrial fibrillation (HCC)       HISTORY OF PRESENT ILLNESS  Mr. Jensen Donald is a 47 y.o. male who returns to see us for follow-up     Pertinent history:  Paroxysmal A. fib: Had 1 episode of A. fib at the age of 34 while he was in CAD with associated palpitations.  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  No recurrent episodes since then.  Essential hypertension: Diagnosed in 2014.  Continue on losartan 25 mg daily  Migraines with aura with aura being vertigo and blurred vision    Last clinic visit: 3/29/2022    Interim History:  Since his last visit, has been doing well from a cardiovascular perspective.  Occasional episodes of palpitations and positional lightheadedness but no chest pain, pressure or tightness.      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        Past Medical History:   Diagnosis Date    A-fib (HCC)     4 years ago, one episode only, echo/stress test normal    Geographic tongue     Hypertension          History reviewed. No pertinent surgical history.      Current Outpatient Medications   Medication Sig Dispense Refill    losartan (COZAAR) 25 MG Tab Take 1 Tablet by mouth every day. 90 Tablet 1    sumatriptan (IMITREX) 100 MG tablet Take 1/2-1 tablet po at onset of headache, may repeat dose in 2 hours if unrelieved.  Do not exceed more than 2 tablets in 24 hours. 9 Tablet 11    pantoprazole (PROTONIX) 20 MG tablet Take 20 mg by mouth every day.      fluticasone (FLONASE) 50 MCG/ACT nasal spray Spray 1 Spray in nose 2 times a day. 3 Bottle 1     No current facility-administered medications for  this visit.         Allergies   Allergen Reactions    Diltiazem Hcl      Other reaction(s): Headache    Lisinopril      cough    Other Drug      Can't remember.  Few yrs ago. Anti-anxiety pill         Family History   Problem Relation Age of Onset    Diabetes Mother     Lung Disease Son          Social History     Socioeconomic History    Marital status:      Spouse name: Not on file    Number of children: Not on file    Years of education: Not on file    Highest education level: Not on file   Occupational History    Not on file   Tobacco Use    Smoking status: Never    Smokeless tobacco: Never   Vaping Use    Vaping Use: Never used   Substance and Sexual Activity    Alcohol use: Yes     Alcohol/week: 1.8 oz     Types: 1 Glasses of wine, 1 Cans of beer, 1 Shots of liquor per week     Comment: Occ    Drug use: Yes     Types: Marijuana     Comment: Gummies/ Vape    Sexual activity: Yes     Partners: Female     Comment: wife, Fern   Other Topics Concern    Not on file   Social History Narrative    Not on file     Social Determinants of Health     Financial Resource Strain: Not on file   Food Insecurity: Not on file   Transportation Needs: Not on file   Physical Activity: Not on file   Stress: Not on file   Social Connections: Not on file   Intimate Partner Violence: Not on file   Housing Stability: Not on file         Physical Exam:  Ambulatory Vitals  /80 (BP Location: Left arm, Patient Position: Sitting, BP Cuff Size: Adult)   Pulse 76   Resp 16   Ht 1.829 m (6')   Wt 98.9 kg (218 lb)   SpO2 100%    Oxygen Therapy:  Pulse Oximetry: 100 %  BP Readings from Last 4 Encounters:   10/10/22 136/80   05/03/22 120/72   03/30/22 126/76   03/29/22 120/84       Weight/BMI: Body mass index is 29.57 kg/m².  Wt Readings from Last 4 Encounters:   10/10/22 98.9 kg (218 lb)   05/03/22 99 kg (218 lb 4.1 oz)   03/30/22 104 kg (229 lb 9.6 oz)   03/29/22 104 kg (230 lb)       GEN: Well developed, well nourished and in  no acute distress.  HEART: no significant JVD, regular rate and rhythm, normal S1 and S2, no murmurs, no third heart sounds, normal cardiac palpation  LUNG: clear to auscultation bilaterally, no wheezing, no crackles, normal respiratory effort on room air  ABDOMEN: soft, non-tender, non-distended, normal bowel sounds throughout  EXTREMITIES: no peripheral edema noted  VASCULAR: no significantly elevated jugular venous pressure, no carotid bruits noted, radial pulses 2+ and equal      Lab Data Review:  Lab Results   Component Value Date/Time    CHOLSTRLTOT 175 03/28/2022 06:14 AM     (H) 03/28/2022 06:14 AM    HDL 42 03/28/2022 06:14 AM    TRIGLYCERIDE 92 03/28/2022 06:14 AM       Lab Results   Component Value Date/Time    SODIUM 139 03/30/2022 10:36 AM    POTASSIUM 4.5 03/30/2022 10:36 AM    CHLORIDE 104 03/30/2022 10:36 AM    CO2 23 03/30/2022 10:36 AM    GLUCOSE 90 03/30/2022 10:36 AM    BUN 13 03/30/2022 10:36 AM    CREATININE 0.84 03/30/2022 10:36 AM     Lab Results   Component Value Date/Time    ALKPHOSPHAT 80 03/30/2022 10:36 AM    ASTSGOT 16 03/30/2022 10:36 AM    ALTSGPT 19 03/30/2022 10:36 AM    TBILIRUBIN 0.4 03/30/2022 10:36 AM      Lab Results   Component Value Date/Time    WBC 3.9 (L) 03/28/2022 06:14 AM     Lab Results   Component Value Date/Time    HBA1C 5.2 11/12/2014 08:05 AM    HBA1C 5.0 11/15/2013 06:55 AM       Cardiac Imaging and Procedures Review:    EKG dated 10/10/2022: My personal interpretation is sinus rhythm    Echo dated 3/30/2022: My personal interpretation is normal left ventricular size and function.  LVEF 60%.  No intracardiac shunt    Exercise stress testing (7/5/2022):    CONCLUSIONS   Positive stress ECG for ischemia with horizontal ST depression in    anterolateral leads may be false positive.   Excellent exercise capacity at 14.9 METS.   Normal blood pressure response.    Holter Monitor (4/1/2022):   Event Monitor Summary:  Time Monitored 14 days     1. Sinus rhythm with  heart rate range from 36 to 142 bpm. Average heart rate 61 bpm.   2. Normal sinus node and AV node conduction normal. No pauses.   3. Rare PACs.   4. Rare PVCs.   5. No sustained rhythm changes. No atrial fibrillation/flutter.   6. Patient triggers were associated with sinus rhythm with heart rate range of 59-95 bpm.     Conclusion: Normal event monitor results     Cardiac event monitor (3/14/2022):   1.  No significant tiffany or tachyarrhythmias were detected.   2.  Rare PACs and rare PVCs were detected.   3.  There were 6 patient triggered events recorded.  Most patient events correlated with normal sinus rhythm.  One event possibly correlated with a PVC, although this may have been artifact.       Radiology test Review:  CTA neck 2/1/2022: Within normal limits      Assessment & Plan     1. Paroxysmal atrial fibrillation (HCC)  EKG      2. Essential hypertension            Doing well from a cardiovascular perspective.  Is concerned about treadmill stress test showing ST depression.  We had an extensive discussion discussing that this is likely a false positive as he was able to achieve 14.9 METS and had no symptoms of chest pain/pressure/tightness.  He continues to exercise and run 2 miles daily without concerning cardiovascular symptoms.  We discussed continuing cardiovascular exercise without any limiting variables.  He is currently scheduled to undergo coronary CTA in January 2023 given positive stress ECG.    Blood pressure well controlled.  Continue losartan 25 mg daily.    No known episodes of A. fib since 2014.  Has occasional palpitations which are short-lived.  We discussed monitoring heart rate and rhythm with [facility] charu.  I did place an order for loop recorder during our last visit but he would like to defer for now which is reasonable.      All of patient's excellent questions were answered to the best of my knowledge and to his satisfaction.  It was a pleasure seeing Mr. Jensen Donald in my clinic  today. Return in about 1 year (around 10/10/2023). Patient is aware to call the cardiology clinic with any questions or concerns.      Jae Flowers MD  [facility]  [facility]  [address], [place] [address]  [place] [address]  Phone: [phone]  Fax: [phone]    Please note that this dictation was created using voice recognition software. I have made every reasonable attempt to correct obvious errors, but it is possible there are errors of grammar and possibly content that I did not discover before finalizing the note.

## 2022-10-19 ENCOUNTER — TELEPHONE (OUTPATIENT)
Dept: NEUROLOGY | Facility: MEDICAL CENTER | Age: 47
End: 2022-10-19
Payer: COMMERCIAL

## 2022-10-19 NOTE — TELEPHONE ENCOUNTER
Established Patient     [facility] Patient is checked in Patient Demographics? Yes    Is visit type and length correct?  Yes    Is referral attached to visit? Yes    Were records received from referring provider? Yes    Patient was not contacted to have someone accompany them to visit?    Is this appointment scheduled as a Hospital Follow-Up?  No    Does the patient require any pre procedure or post procedure follow up? No    If any orders were placed at last visit or intended to be done for this visit do we have Results/Consult Notes? No  Labs - Labs were not ordered at last office visit.  Note: If patient appointment is for lab review and patient did not complete labs, check with provider if OK to reschedule patient until labs completed.  Imaging - Imaging was not ordered at last office visit.  Referrals - No referrals were ordered at last office visit.        10.  If patient appointment is for Botox - is order pended for provider? No

## 2022-10-25 ENCOUNTER — OFFICE VISIT (OUTPATIENT)
Dept: NEUROLOGY | Facility: MEDICAL CENTER | Age: 47
End: 2022-10-25
Attending: NURSE PRACTITIONER
Payer: COMMERCIAL

## 2022-10-25 VITALS
SYSTOLIC BLOOD PRESSURE: 122 MMHG | TEMPERATURE: 98 F | WEIGHT: 220.68 LBS | HEART RATE: 92 BPM | DIASTOLIC BLOOD PRESSURE: 86 MMHG | OXYGEN SATURATION: 100 % | BODY MASS INDEX: 29.93 KG/M2

## 2022-10-25 DIAGNOSIS — G43.109 MIGRAINE WITH AURA AND WITHOUT STATUS MIGRAINOSUS, NOT INTRACTABLE: ICD-10-CM

## 2022-10-25 PROCEDURE — 99213 OFFICE O/P EST LOW 20 MIN: CPT | Performed by: NURSE PRACTITIONER

## 2022-10-25 PROCEDURE — 99211 OFF/OP EST MAY X REQ PHY/QHP: CPT | Performed by: NURSE PRACTITIONER

## 2022-10-25 ASSESSMENT — ENCOUNTER SYMPTOMS
DEPRESSION: 0
SENSORY CHANGE: 0
SHORTNESS OF BREATH: 0
PALPITATIONS: 0
HEADACHES: 1
NERVOUS/ANXIOUS: 0
NECK PAIN: 1
SINUS PAIN: 0
TINGLING: 1
VOMITING: 0
DIZZINESS: 0
NAUSEA: 0
COUGH: 0

## 2022-10-25 ASSESSMENT — FIBROSIS 4 INDEX: FIB4 SCORE: 0.92

## 2022-10-25 NOTE — PROGRESS NOTES
Subjective         HPI    Jensen Donald is a 47 y.o. male who presents with Follow-Up for chronic headache    I last saw him on 5/3/2022    He did have a bad headache a couple of days ago, but had not had them in a while.      He reports tingling and sensation of the back of the head on the left side and on the  neck.    He has not had an episode of vertigo since before last visit.    He still has episodes of blurred vision, sometimes with headaches, sometimes without.    He has not taken sumatriptan.          Review of Systems   HENT:  Negative for congestion and sinus pain.    Respiratory:  Negative for cough and shortness of breath.    Cardiovascular:  Negative for chest pain and palpitations.   Gastrointestinal:  Negative for nausea and vomiting.   Musculoskeletal:  Positive for neck pain.   Neurological:  Positive for tingling and headaches. Negative for dizziness and sensory change.   Psychiatric/Behavioral:  Negative for depression. The patient is not nervous/anxious.           Current Outpatient Medications on File Prior to Visit   Medication Sig Dispense Refill    sumatriptan (IMITREX) 100 MG tablet Take 1/2-1 tablet po at onset of headache, may repeat dose in 2 hours if unrelieved.  Do not exceed more than 2 tablets in 24 hours. 9 Tablet 11    pantoprazole (PROTONIX) 20 MG tablet Take 20 mg by mouth every day.      fluticasone (FLONASE) 50 MCG/ACT nasal spray Spray 1 Spray in nose 2 times a day. 3 Bottle 1     No current facility-administered medications on file prior to visit.       Past Medical History:   Diagnosis Date    A-fib (HCC)     4 years ago, one episode only, echo/stress test normal    Geographic tongue     Hypertension       Social History     Tobacco Use    Smoking status: Never    Smokeless tobacco: Never   Vaping Use    Vaping Use: Never used   Substance Use Topics    Alcohol use: Yes     Alcohol/week: 1.8 oz     Types: 1 Glasses of wine, 1 Cans of beer, 1 Shots of liquor per  week     Comment: Occ    Drug use: Not Currently     Types: Marijuana          Objective          I personally reviewed imaging below and agree with findings:  CTA head 2/1/2022  1.  No large vessel occlusion or aneurysm identified  CTA neck 2/1/2022  CT angiogram of the neck within normal limits.     CT  Head 2/1/2022      No acute intracranial abnormality    TTE 3/30/2022:  LVEF 60%, bubble negative,  LA size WNL, ANGELA 27      /86 (BP Location: Right arm, Patient Position: Sitting, BP Cuff Size: Adult)   Pulse 92   Temp 36.7 °C (98 °F) (Temporal)   Wt 100 kg (220 lb 10.9 oz)   SpO2 100%   BMI 29.93 kg/m²        PHYSICAL EXAM  Constitutional:  Alert, no apparent distress,  Psych:   mood and affect WNL     Neuro:  Oriented X 4, speech fluent, naming and memory intact          Muskuloskeletal:  Moves all extremities equally, strength 5/5 bilaterally,          CN II: . Fundoscopic exam is normal with sharp discs and no vascular changes. Pupils are 4 mm and briskly reactive to light.          CN III, IV, VI  EOMs intact, no ptosis         CN V: Corneal responses are intact.         CN VII: Face is symmetric with normal eye closure and smile.         CN IX, X: Palate elevates symmetrically. Phonation is normal.         CN XI: Head turning and shoulder shrug are intact         CN XII: Tongue is midline with normal movements and no atrophy.                                       Ambulates with steady gait.                              Cardiovascular:    S1S2, no abnormal rhythm auscultated, no peripheral edema  Neck:                     No carotid bruits noted   Pulmonary:            Respirations easy, lungs clear to auscultation all fields.     Skin:                     No obvious rashes.          Assessment & Plan     1. Migraine with aura and without status migrainosus, not intractable,     He has had a reduction in migraine days, he still has sumatriptan but rarely uses it.      Refer back to PCP for further  care.       If he would have repeated occipital pain w/ or w/o vertigo, we could refer him to our physiatry pain management clinic for ONB        Continue supplements as below      magnesium oxide 400mg by mouth every night, may take extra dose if needed for headache (over the counter), hold for diarrhea    Riboflavin (Vitamin B2) 400mg by mouth every night (over the counter),may turn urine bright yellow         COQ 10, take 300mg every night. (over the counter)          Attempt to go to bed and get up at the same time every night           Eat meals on regular basis            Stay hydrated.             Aerobic exercise 30 minutes daily             Avoid aged or smoked foods, avoid processed foods, red wine, aged cheese              Keep headache diary, include foods that you may have eaten.             Avoid overusing over the counter medications:  Do not take more than 500mg acetaminophen (tylenol), more than 4 times weekly, more frequent or larger doses are associated with medication overuse headache.            Continue Sumatriptan 100mg Take 1/2-1 tablet by mouth  at onset of headache, may repeat dose in 2 hours if unrelieved.  Do not exceed more than 2 tablets in 24 hours.    Referral back to primary care           Follow up PRN

## 2022-11-30 ENCOUNTER — OFFICE VISIT (OUTPATIENT)
Dept: URGENT CARE | Facility: PHYSICIAN GROUP | Age: 47
End: 2022-11-30
Payer: COMMERCIAL

## 2022-11-30 VITALS
DIASTOLIC BLOOD PRESSURE: 80 MMHG | RESPIRATION RATE: 18 BRPM | BODY MASS INDEX: 30.66 KG/M2 | HEIGHT: 72 IN | WEIGHT: 226.4 LBS | OXYGEN SATURATION: 100 % | TEMPERATURE: 99.4 F | SYSTOLIC BLOOD PRESSURE: 118 MMHG | HEART RATE: 67 BPM

## 2022-11-30 DIAGNOSIS — J11.1 INFLUENZA: ICD-10-CM

## 2022-11-30 LAB
FLUAV+FLUBV AG SPEC QL IA: NORMAL
INT CON NEG: NORMAL
INT CON NEG: NORMAL
INT CON POS: NORMAL
INT CON POS: NORMAL
S PYO AG THROAT QL: NORMAL

## 2022-11-30 PROCEDURE — 87880 STREP A ASSAY W/OPTIC: CPT | Performed by: NURSE PRACTITIONER

## 2022-11-30 PROCEDURE — 99213 OFFICE O/P EST LOW 20 MIN: CPT | Performed by: NURSE PRACTITIONER

## 2022-11-30 PROCEDURE — 87804 INFLUENZA ASSAY W/OPTIC: CPT | Performed by: NURSE PRACTITIONER

## 2022-11-30 RX ORDER — ALBUTEROL SULFATE 90 UG/1
2 AEROSOL, METERED RESPIRATORY (INHALATION) EVERY 6 HOURS PRN
Qty: 8.5 G | Refills: 0 | Status: SHIPPED | OUTPATIENT
Start: 2022-11-30 | End: 2023-03-06

## 2022-11-30 ASSESSMENT — ENCOUNTER SYMPTOMS
EYES NEGATIVE: 1
SORE THROAT: 1
FEVER: 0
SHORTNESS OF BREATH: 1
COUGH: 1
GASTROINTESTINAL NEGATIVE: 1

## 2022-11-30 ASSESSMENT — FIBROSIS 4 INDEX: FIB4 SCORE: 0.92

## 2022-12-01 NOTE — PROGRESS NOTES
Subjective:   Jensen Donald is a 47 y.o. male who presents for Cough (Covid test at home was neg), Pharyngitis (X 4 days, losing voice and hard to breath), and Headache (Brain fog as well)      Influenza  This is a new problem. Episode onset: 4 days. The problem occurs constantly. The problem has been gradually improving. Associated symptoms include chest pain, congestion, coughing and a sore throat. Pertinent negatives include no fever. Nothing aggravates the symptoms. He has tried acetaminophen, NSAIDs, drinking, rest and sleep for the symptoms. The treatment provided mild relief.     Review of Systems   Constitutional:  Positive for malaise/fatigue. Negative for fever.   HENT:  Positive for congestion and sore throat.    Eyes: Negative.    Respiratory:  Positive for cough and shortness of breath.    Cardiovascular:  Positive for chest pain.   Gastrointestinal: Negative.    Skin: Negative.      Medications, Allergies, and current problem list reviewed today in Epic.     Objective:     /80   Pulse 67   Temp 37.4 °C (99.4 °F) (Temporal)   Resp 18   Ht 1.829 m (6')   Wt 103 kg (226 lb 6.4 oz)   SpO2 100%     Physical Exam  Vitals reviewed.   Constitutional:       Appearance: Normal appearance.   HENT:      Head: Normocephalic and atraumatic.      Nose: Congestion and rhinorrhea present.      Mouth/Throat:      Mouth: Mucous membranes are moist.      Pharynx: Oropharynx is clear. Posterior oropharyngeal erythema present.   Eyes:      Extraocular Movements: Extraocular movements intact.      Conjunctiva/sclera: Conjunctivae normal.      Pupils: Pupils are equal, round, and reactive to light.   Cardiovascular:      Rate and Rhythm: Normal rate and regular rhythm.      Pulses: Normal pulses.      Heart sounds: Normal heart sounds.   Pulmonary:      Effort: Pulmonary effort is normal.      Breath sounds: Normal breath sounds.   Abdominal:      General: Abdomen is flat. Bowel sounds are normal.       Palpations: Abdomen is soft.   Musculoskeletal:         General: Normal range of motion.      Cervical back: Normal range of motion and neck supple.   Skin:     General: Skin is warm and dry.      Capillary Refill: Capillary refill takes less than 2 seconds.   Neurological:      General: No focal deficit present.      Mental Status: He is alert and oriented to person, place, and time.   Psychiatric:         Mood and Affect: Mood normal.         Behavior: Behavior normal.     POCT Influenza A positive, POCT strep negative.    Assessment/Plan:     Diagnosis and associated orders:     1. Influenza  POCT Influenza A/B    POCT Rapid Strep A    albuterol 108 (90 Base) MCG/ACT Aero Soln inhalation aerosol         Comments/MDM:     Advised patient symptoms are viral in etiology, recommend supportive care. Increased fluids and rest.  Recommend over-the-counter cold and flu medications and Tylenol and/or ibuprofen for symptomatic relief and fevers.  Discussed use of nedi-pot, humidifier, and Flonase nasal spray as well.  Discussed good hand hygiene and ways to decrease spread of disease.  Follow-up with PCP return for reevaluation if symptoms persist/worsen.  Patient offered discharge instructions regarding flu.  The patient demonstrated a good understanding and agreed with the treatment plan.            Differential diagnosis, natural history, supportive care, and indications for immediate follow-up discussed.    Advised the patient to follow-up with the primary care physician for recheck, reevaluation, and consideration of further management.    Please note that this dictation was created using voice recognition software. I have made a reasonable attempt to correct obvious errors, but I expect that there are errors of grammar and possibly content that I did not discover before finalizing the note.    This note was electronically signed by ABY Munson

## 2022-12-06 ENCOUNTER — OFFICE VISIT (OUTPATIENT)
Dept: URGENT CARE | Facility: PHYSICIAN GROUP | Age: 47
End: 2022-12-06
Payer: COMMERCIAL

## 2022-12-06 ENCOUNTER — HOSPITAL ENCOUNTER (OUTPATIENT)
Facility: MEDICAL CENTER | Age: 47
End: 2022-12-06
Attending: FAMILY MEDICINE
Payer: COMMERCIAL

## 2022-12-06 VITALS
SYSTOLIC BLOOD PRESSURE: 138 MMHG | BODY MASS INDEX: 30.77 KG/M2 | WEIGHT: 227.2 LBS | TEMPERATURE: 98.7 F | HEART RATE: 67 BPM | RESPIRATION RATE: 16 BRPM | DIASTOLIC BLOOD PRESSURE: 78 MMHG | OXYGEN SATURATION: 99 % | HEIGHT: 72 IN

## 2022-12-06 DIAGNOSIS — I10 PRIMARY HYPERTENSION: ICD-10-CM

## 2022-12-06 DIAGNOSIS — I10 ESSENTIAL HYPERTENSION: ICD-10-CM

## 2022-12-06 DIAGNOSIS — J45.41 MODERATE PERSISTENT ASTHMA WITH ACUTE EXACERBATION: ICD-10-CM

## 2022-12-06 PROCEDURE — 99214 OFFICE O/P EST MOD 30 MIN: CPT | Performed by: FAMILY MEDICINE

## 2022-12-06 PROCEDURE — 0240U HCHG SARS-COV-2 COVID-19 NFCT DS RESP RNA 3 TRGT MIC: CPT

## 2022-12-06 RX ORDER — DOXYCYCLINE HYCLATE 100 MG
100 TABLET ORAL 2 TIMES DAILY
Qty: 10 TABLET | Refills: 0 | Status: SHIPPED | OUTPATIENT
Start: 2022-12-06 | End: 2022-12-11

## 2022-12-06 RX ORDER — LOSARTAN POTASSIUM 25 MG/1
25 TABLET ORAL DAILY
Qty: 30 TABLET | Refills: 0 | Status: SHIPPED | OUTPATIENT
Start: 2022-12-06 | End: 2023-03-06 | Stop reason: SDUPTHER

## 2022-12-06 RX ORDER — METHYLPREDNISOLONE 4 MG/1
TABLET ORAL
Qty: 21 TABLET | Refills: 0 | Status: SHIPPED | OUTPATIENT
Start: 2022-12-06 | End: 2023-02-13

## 2022-12-06 ASSESSMENT — FIBROSIS 4 INDEX: FIB4 SCORE: 0.92

## 2022-12-06 NOTE — PROGRESS NOTES
Chief Complaint   Patient presents with    Cough    Fever    Congestion     Pos flu 11/30 still feeling symptoms. Mucous production brown in color.        Subjective:     Chief Complaint   Patient presents with    Cough    Fever    Congestion     Pos flu 11/30 still feeling symptoms. Mucous production brown in color.             Asthma  Pt complains of cough x 7 d. There is no sputum production. This is a new problem. The current episode started yesterday. The problem occurs constantly. The problem has been unchanged.   + subj fever     Associated symptoms include dyspnea on exertion. Pertinent negatives include no chest pain,  , nasal congestion, orthopnea, PND, rhinorrhea or sore throat.     Pt's symptoms are aggravated by exertion.     Pt's symptoms are not alleviated by beta-agonist.     Pt's past medical history is significant for asthma.     Past Medical History:   Diagnosis Date    A-fib (HCC)     4 years ago, one episode only, echo/stress test normal    Geographic tongue     Hypertension        Social History     Tobacco Use    Smoking status: Never    Smokeless tobacco: Never   Vaping Use    Vaping Use: Never used   Substance Use Topics    Alcohol use: Yes     Alcohol/week: 1.8 oz     Types: 1 Glasses of wine, 1 Cans of beer, 1 Shots of liquor per week     Comment: Occ    Drug use: Not Currently     Types: Marijuana       Family History   Problem Relation Age of Onset    Diabetes Mother     Lung Disease Son        Review of Systems   Constitutional: Negative for fever.   HENT: Negative for rhinorrhea and sore throat.    Respiratory: Positive for cough and shortness of breath. Negative for sputum production.    Cardiovascular: Positive for dyspnea on exertion. Negative for chest pain, palpitations and PND.   All other systems reviewed and are negative.         Objective:     /78   Pulse 67   Temp 37.1 °C (98.7 °F) (Temporal)   Resp 16   Ht 1.829 m (6')   Wt 103 kg (227 lb 3.2 oz)   SpO2 99%        Physical Exam   Constitutional: pt is oriented to person, place, and time. Pt appears well-developed. No distress.   HENT:   Head: Normocephalic and atraumatic.   Right Ear: External ear normal.   Left Ear: External ear normal.   Mouth/Throat: No oropharyngeal exudate.   Eyes: Conjunctivae and EOM are normal. Pupils are equal, round, and reactive to light. No scleral icterus.   Neck: Neck supple. No JVD present.   Cardiovascular: Normal rate, regular rhythm and normal heart sounds.    Pulmonary/Chest: Effort normal. No respiratory distress. Pt has wheezes.   no rales.   Lymphadenopathy:        no cervical adenopathy.   Neurological:   alert and oriented to person, place, and time.   Skin: Skin is warm and not diaphoretic. No erythema.   Psychiatric: pt behavior is normal.   Nursing note and vitals reviewed.              Assessment/Plan:      1. Moderate persistent asthma with acute exacerbation   Pt is not hypoxic   - methylPREDNISolone (MEDROL DOSEPAK) 4 MG Tablet Therapy Pack; Follow schedule on package instructions.  Dispense: 21 Tablet; Refill: 0  - doxycycline (VIBRAMYCIN) 100 MG Tab; Take 1 Tablet by mouth 2 times a day for 5 days.  Dispense: 10 Tablet; Refill: 0  - CoV-2 and Flu A/B by PCR (24 hour In-House): Collect NP swab in [place]; Future      Follow up in one week if no improvement, sooner if symptoms worsen.     2. Primary hypertension   Not at goal - pt has run out of cozaar    Cozaar refilled    Encouraged medication compliance.     F/u PCP   - losartan (COZAAR) 25 MG Tab; Take 1 Tablet by mouth every day.  Dispense: 30 Tablet; Refill: 0

## 2022-12-07 LAB
FLUAV RNA SPEC QL NAA+PROBE: NEGATIVE
FLUBV RNA SPEC QL NAA+PROBE: NEGATIVE
SARS-COV-2 RNA RESP QL NAA+PROBE: NOTDETECTED
SPECIMEN SOURCE: NORMAL

## 2022-12-14 ENCOUNTER — HOSPITAL ENCOUNTER (EMERGENCY)
Facility: MEDICAL CENTER | Age: 47
End: 2022-12-15
Attending: STUDENT IN AN ORGANIZED HEALTH CARE EDUCATION/TRAINING PROGRAM
Payer: COMMERCIAL

## 2022-12-14 ENCOUNTER — APPOINTMENT (OUTPATIENT)
Dept: RADIOLOGY | Facility: MEDICAL CENTER | Age: 47
End: 2022-12-14
Attending: STUDENT IN AN ORGANIZED HEALTH CARE EDUCATION/TRAINING PROGRAM
Payer: COMMERCIAL

## 2022-12-14 DIAGNOSIS — R51.9 NONINTRACTABLE HEADACHE, UNSPECIFIED CHRONICITY PATTERN, UNSPECIFIED HEADACHE TYPE: ICD-10-CM

## 2022-12-14 LAB
ALBUMIN SERPL BCP-MCNC: 4.6 G/DL (ref 3.2–4.9)
ALBUMIN/GLOB SERPL: 1.9 G/DL
ALP SERPL-CCNC: 71 U/L (ref 30–99)
ALT SERPL-CCNC: 21 U/L (ref 2–50)
ANION GAP SERPL CALC-SCNC: 10 MMOL/L (ref 7–16)
AST SERPL-CCNC: 14 U/L (ref 12–45)
BASOPHILS # BLD AUTO: 0.5 % (ref 0–1.8)
BASOPHILS # BLD: 0.04 K/UL (ref 0–0.12)
BILIRUB SERPL-MCNC: 0.6 MG/DL (ref 0.1–1.5)
BUN SERPL-MCNC: 22 MG/DL (ref 8–22)
CALCIUM ALBUM COR SERPL-MCNC: 8.7 MG/DL (ref 8.5–10.5)
CALCIUM SERPL-MCNC: 9.2 MG/DL (ref 8.5–10.5)
CHLORIDE SERPL-SCNC: 103 MMOL/L (ref 96–112)
CO2 SERPL-SCNC: 24 MMOL/L (ref 20–33)
CREAT SERPL-MCNC: 0.79 MG/DL (ref 0.5–1.4)
EKG IMPRESSION: NORMAL
EOSINOPHIL # BLD AUTO: 0.09 K/UL (ref 0–0.51)
EOSINOPHIL NFR BLD: 1.1 % (ref 0–6.9)
ERYTHROCYTE [DISTWIDTH] IN BLOOD BY AUTOMATED COUNT: 37.6 FL (ref 35.9–50)
GFR SERPLBLD CREATININE-BSD FMLA CKD-EPI: 110 ML/MIN/1.73 M 2
GLOBULIN SER CALC-MCNC: 2.4 G/DL (ref 1.9–3.5)
GLUCOSE SERPL-MCNC: 95 MG/DL (ref 65–99)
HCT VFR BLD AUTO: 43.6 % (ref 42–52)
HGB BLD-MCNC: 15.5 G/DL (ref 14–18)
IMM GRANULOCYTES # BLD AUTO: 0.03 K/UL (ref 0–0.11)
IMM GRANULOCYTES NFR BLD AUTO: 0.4 % (ref 0–0.9)
LYMPHOCYTES # BLD AUTO: 1.42 K/UL (ref 1–4.8)
LYMPHOCYTES NFR BLD: 17.4 % (ref 22–41)
MCH RBC QN AUTO: 30.5 PG (ref 27–33)
MCHC RBC AUTO-ENTMCNC: 35.6 G/DL (ref 33.7–35.3)
MCV RBC AUTO: 85.8 FL (ref 81.4–97.8)
MONOCYTES # BLD AUTO: 0.42 K/UL (ref 0–0.85)
MONOCYTES NFR BLD AUTO: 5.1 % (ref 0–13.4)
NEUTROPHILS # BLD AUTO: 6.16 K/UL (ref 1.82–7.42)
NEUTROPHILS NFR BLD: 75.5 % (ref 44–72)
NRBC # BLD AUTO: 0 K/UL
NRBC BLD-RTO: 0 /100 WBC
PLATELET # BLD AUTO: 238 K/UL (ref 164–446)
PMV BLD AUTO: 10.4 FL (ref 9–12.9)
POTASSIUM SERPL-SCNC: 3.9 MMOL/L (ref 3.6–5.5)
PROT SERPL-MCNC: 7 G/DL (ref 6–8.2)
RBC # BLD AUTO: 5.08 M/UL (ref 4.7–6.1)
SODIUM SERPL-SCNC: 137 MMOL/L (ref 135–145)
TROPONIN T SERPL-MCNC: 7 NG/L (ref 6–19)
WBC # BLD AUTO: 8.2 K/UL (ref 4.8–10.8)

## 2022-12-14 PROCEDURE — 700111 HCHG RX REV CODE 636 W/ 250 OVERRIDE (IP): Performed by: STUDENT IN AN ORGANIZED HEALTH CARE EDUCATION/TRAINING PROGRAM

## 2022-12-14 PROCEDURE — 96374 THER/PROPH/DIAG INJ IV PUSH: CPT

## 2022-12-14 PROCEDURE — 700105 HCHG RX REV CODE 258: Performed by: STUDENT IN AN ORGANIZED HEALTH CARE EDUCATION/TRAINING PROGRAM

## 2022-12-14 PROCEDURE — C9803 HOPD COVID-19 SPEC COLLECT: HCPCS | Performed by: STUDENT IN AN ORGANIZED HEALTH CARE EDUCATION/TRAINING PROGRAM

## 2022-12-14 PROCEDURE — 99285 EMERGENCY DEPT VISIT HI MDM: CPT

## 2022-12-14 PROCEDURE — 64405 NJX AA&/STRD GR OCPL NRV: CPT

## 2022-12-14 PROCEDURE — 71045 X-RAY EXAM CHEST 1 VIEW: CPT

## 2022-12-14 PROCEDURE — 85025 COMPLETE CBC W/AUTO DIFF WBC: CPT

## 2022-12-14 PROCEDURE — 96375 TX/PRO/DX INJ NEW DRUG ADDON: CPT

## 2022-12-14 PROCEDURE — 93005 ELECTROCARDIOGRAM TRACING: CPT

## 2022-12-14 PROCEDURE — 0241U HCHG SARS-COV-2 COVID-19 NFCT DS RESP RNA 4 TRGT MIC: CPT

## 2022-12-14 PROCEDURE — 80053 COMPREHEN METABOLIC PANEL: CPT

## 2022-12-14 PROCEDURE — 93005 ELECTROCARDIOGRAM TRACING: CPT | Performed by: STUDENT IN AN ORGANIZED HEALTH CARE EDUCATION/TRAINING PROGRAM

## 2022-12-14 PROCEDURE — 36415 COLL VENOUS BLD VENIPUNCTURE: CPT

## 2022-12-14 PROCEDURE — 84484 ASSAY OF TROPONIN QUANT: CPT

## 2022-12-14 RX ORDER — SODIUM CHLORIDE 9 MG/ML
1000 INJECTION, SOLUTION INTRAVENOUS ONCE
Status: COMPLETED | OUTPATIENT
Start: 2022-12-14 | End: 2022-12-15

## 2022-12-14 RX ORDER — DIPHENHYDRAMINE HYDROCHLORIDE 50 MG/ML
25 INJECTION INTRAMUSCULAR; INTRAVENOUS ONCE
Status: COMPLETED | OUTPATIENT
Start: 2022-12-14 | End: 2022-12-14

## 2022-12-14 RX ORDER — DEXAMETHASONE SODIUM PHOSPHATE 10 MG/ML
10 INJECTION, SOLUTION INTRAMUSCULAR; INTRAVENOUS ONCE
Status: COMPLETED | OUTPATIENT
Start: 2022-12-14 | End: 2022-12-14

## 2022-12-14 RX ORDER — PROCHLORPERAZINE EDISYLATE 5 MG/ML
10 INJECTION INTRAMUSCULAR; INTRAVENOUS ONCE
Status: COMPLETED | OUTPATIENT
Start: 2022-12-14 | End: 2022-12-14

## 2022-12-14 RX ADMIN — DEXAMETHASONE SODIUM PHOSPHATE 10 MG: 10 INJECTION INTRAMUSCULAR; INTRAVENOUS at 22:40

## 2022-12-14 RX ADMIN — PROCHLORPERAZINE EDISYLATE 10 MG: 5 INJECTION INTRAMUSCULAR; INTRAVENOUS at 22:39

## 2022-12-14 RX ADMIN — SODIUM CHLORIDE 1000 ML: 9 INJECTION, SOLUTION INTRAVENOUS at 22:37

## 2022-12-14 RX ADMIN — DIPHENHYDRAMINE HYDROCHLORIDE 25 MG: 50 INJECTION INTRAMUSCULAR; INTRAVENOUS at 22:40

## 2022-12-14 ASSESSMENT — FIBROSIS 4 INDEX: FIB4 SCORE: 0.92

## 2022-12-15 VITALS
HEART RATE: 72 BPM | BODY MASS INDEX: 30.16 KG/M2 | WEIGHT: 222.66 LBS | TEMPERATURE: 97.8 F | HEIGHT: 72 IN | SYSTOLIC BLOOD PRESSURE: 139 MMHG | RESPIRATION RATE: 18 BRPM | OXYGEN SATURATION: 94 % | DIASTOLIC BLOOD PRESSURE: 82 MMHG

## 2022-12-15 LAB
FLUAV RNA SPEC QL NAA+PROBE: NEGATIVE
FLUBV RNA SPEC QL NAA+PROBE: NEGATIVE
RSV RNA SPEC QL NAA+PROBE: NEGATIVE
SARS-COV-2 RNA RESP QL NAA+PROBE: NOTDETECTED
SPECIMEN SOURCE: NORMAL

## 2022-12-15 PROCEDURE — 64405 NJX AA&/STRD GR OCPL NRV: CPT

## 2022-12-15 PROCEDURE — 700101 HCHG RX REV CODE 250: Performed by: STUDENT IN AN ORGANIZED HEALTH CARE EDUCATION/TRAINING PROGRAM

## 2022-12-15 RX ORDER — SUMATRIPTAN 25 MG/1
25-100 TABLET, FILM COATED ORAL
Qty: 10 TABLET | Refills: 3 | Status: SHIPPED | OUTPATIENT
Start: 2022-12-15 | End: 2023-03-06

## 2022-12-15 RX ORDER — IBUPROFEN 600 MG/1
600 TABLET ORAL EVERY 6 HOURS PRN
Qty: 30 TABLET | Refills: 0 | Status: SHIPPED | OUTPATIENT
Start: 2022-12-15 | End: 2023-03-06

## 2022-12-15 RX ORDER — ACETAMINOPHEN 500 MG
500-1000 TABLET ORAL EVERY 6 HOURS PRN
Qty: 30 TABLET | Refills: 0 | Status: SHIPPED | OUTPATIENT
Start: 2022-12-15 | End: 2023-03-06

## 2022-12-15 RX ADMIN — LIDOCAINE HYDROCHLORIDE 20 ML: 10 INJECTION, SOLUTION INFILTRATION; PERINEURAL at 01:00

## 2022-12-15 NOTE — ED TRIAGE NOTES
Jensen Donald  47 y.o. male  Chief Complaint   Patient presents with    Shortness of Breath     Onset 10 minutes ago    Headache     Starting 3pm, associated with onset of htn. Pt began taking losartan 25mg for his htn. Pt took losartan at 4pm    Dizziness     Starting 3pm       Vitals:    12/14/22 1929   BP: (!) 184/94   Pulse: 64   Resp: 14   Temp: 37.9 °C (100.3 °F)   SpO2: 100%       Patient educated on triage process and encouraged to alert staff of any changes in condition.    Chest pain protocol ordered

## 2022-12-15 NOTE — ED TRIAGE NOTES
Pt c/o high blood pressure, headache, and dizziness at triage. Vitals assessed as documented. Pt ambulating around the lobby without assistance with steady gait.

## 2022-12-15 NOTE — ED PROVIDER NOTES
CHIEF COMPLAINT  Chief Complaint   Patient presents with    Shortness of Breath     Onset 10 minutes ago    Headache     Starting 3pm, associated with onset of htn. Pt began taking losartan 25mg for his htn. Pt took losartan at 4pm    Dizziness     Starting 3pm       HPI  Jensen Donald is a 47 y.o. male who presents evaluation of multiple complaints including a headache, muscle aches, shortness of breath, and lightheadedness.  Patient states around 3 PM he had a gradual onset of a headache that began in the back of his neck and slowly spread towards the front of his head.  There is described as a throbbing currently rated a 8-9 out of 10.  Does have a prior history of migraine and tension type headaches does state this feels similar to those.  Though he did notice blood pressure was elevated at home.  Took his losartan and continued to have high blood pressure so he came to the ER.  Was also complaining of intermittent episodes of lightheadedness, no actual syncopal episodes.  Does have a prior history of vertigo tried taking meclizine, without relief of his symptoms.  Denies any current sensation that the room is spinning.  No numbness tingling weakness in extremities.  does admit to some mild shortness of breath though no cough no wheezing no chest pain abdominal pain or other complaints.    REVIEW OF SYSTEMS  See HPI for further details. All other systems are negative.     PAST MEDICAL HISTORY   has a past medical history of A-fib (HCC), Geographic tongue, and Hypertension.    SOCIAL HISTORY  Social History     Tobacco Use    Smoking status: Former     Packs/day: 0.25     Years: 1.00     Pack years: 0.25     Types: Cigarettes    Smokeless tobacco: Never   Vaping Use    Vaping Use: Never used   Substance and Sexual Activity    Alcohol use: Yes     Alcohol/week: 1.8 oz     Types: 1 Glasses of wine, 1 Cans of beer, 1 Shots of liquor per week     Comment: Occ    Drug use: Not Currently     Types: Marijuana     Sexual activity: Yes     Partners: Female     Comment: wife, Fern       SURGICAL HISTORY  patient denies any surgical history    CURRENT MEDICATIONS  Home Medications    **Home medications have not yet been reviewed for this encounter**         ALLERGIES  Allergies   Allergen Reactions    Diltiazem Hcl      Other reaction(s): Headache    Lisinopril      cough    Other Drug      Can't remember.  Few yrs ago. Anti-anxiety pill       FAMILY HISTORY  No pertinent family history    PHYSICAL EXAM   BP (!) 184/94   Pulse 64   Temp 37.9 °C (100.3 °F) (Temporal)   Resp 14   Ht 1.829 m (6')   Wt 101 kg (222 lb 10.6 oz)   SpO2 100%   BMI 30.20 kg/m²  @BASHIR[[id number]::@   Pulse ox interpretation: I interpret this pulse ox as normal.  VITALS - vital signs documented prior to this note have been reviewed and noted,  GENERAL - awake, alert, oriented, GCS 15, no apparent distress, non-toxic  appearing  HEENT - normocephalic, atraumatic, pupils equal, sclera anicteric, mucus  membranes moist  NECK - supple, no meningismus, full active range of motion, trachea midline  CARDIOVASCULAR - regular rate/rhythm, no murmurs/gallops/rubs  PULMONARY - no respiratory distress, speaking in full sentences, clear to  auscultation bilaterally, no wheezing/ronchi/rales, no accessory muscle use  GASTROINTESTINAL - soft, non-tender, non-distended, no rebound, guarding,  or peritonitis  GENITOURINARY - Deferred  NEUROLOGIC - cranial nerves II through XII are intact pupils are equally round reactive to light right upper and left upper extremity hand , flexion at elbow, extension at the elbow 5 out of 5,   right lower left lower extremity leg raise, plantar flexion, dorsiflexion 5 out of 5 bilaterally, sensation is grossly intact in all extremities patellar DTRs are 2+ bilaterally no truncal ataxia normal finger-to-nose no appreciable papilledema on funduscopic exam  MUSCULOSKELETAL - no obvious asymmetry or deformities present  EXTREMITIES -  "warm, well-perfused, no cyanosis or significant edema  DERMATOLOGIC - warm, dry, no rashes, no jaundice  PSYCHIATRIC - normal affect, normal insight, normal concentration            EKG  Sinus rhythm  Probable left ventricular hypertrophy  Compared to ECG 10/10/2022 08:02:17  No significant changes  There is no STEMI pattern otherwise normal ND QRS and QTc interval left axis rate of 70 interpretation is sinus with LVH    LABS      Labs Reviewed   CBC WITH DIFFERENTIAL    Narrative:     Biotin intake of greater than 5 mg per day may interfere with  troponin levels, causing false low values.   COMP METABOLIC PANEL    Narrative:     Biotin intake of greater than 5 mg per day may interfere with  troponin levels, causing false low values.   TROPONIN    Narrative:     Biotin intake of greater than 5 mg per day may interfere with  troponin levels, causing false low values.   TROPONIN   COV-2, FLU A/B, AND RSV BY PCR ([facility])         Pertinent Labs & Imaging studies reviewed. (See chart for details)    RADIOLOGY  DX-CHEST-PORTABLE (1 VIEW)    (Results Pending)             ED COURSE/PROCEDURES        Risks and benefits: risks, benefits and alternatives were discussed  Consent given by: patient and/or guardian  Patient understanding: patient/guardian states understanding of the procedure being performed  Patient consent: the patient/guardian's understanding of the procedure matches consent given  Patient identity confirmed: verbally with patient and arm band  Time out: Immediately prior to procedure a \"time out\" was called to verify the correct patient, procedure, equipment, support staff and site/side marked as required.  Location details: Occipital nerve block  Injection: using standard, sterile technique 2.5 cc of lidocaine without epinephrine were injected 2 cm inferiorly and laterally on the right and left of the occipital protuberance   Medications   prochlorperazine (COMPAZINE) injection 10 mg (has no administration " in time range)   diphenhydrAMINE (BENADRYL) injection 25 mg (has no administration in time range)   NS (BOLUS) infusion 1,000 mL (has no administration in time range)   dexamethasone pf (DECADRON) injection 10 mg (has no administration in time range)               MEDICAL DECISION MAKING    Patient presented for multiple complaints including headache muscle aches shortness of breath and lightheadedness.  The crux of the patient's symptoms seem to be centered around his headache, states he works as a  and over the past several days has had pain in the back of his head that radiates forward and is gradually become a throbbing sensation with associated photophobia, and has felt prior to similar headaches.  He has a normal neurologic exam in the ER, does have a prior history of similar headaches.  Headache was not sudden onset nor the worst of his life.  And no meningeal signs.  At this time there is low concern for underlying subarachnoid hemorrhage intracranial mass or bleed, meningitis, temporal arteritis, or other life threatenin cause of his headache  He had labs ordered in triage which were unremarkable.  Viral testing is negative.  After symptomatic treatment including an occipital nerve block.  The patient did feel better.  Repeat neurologic exam performed at 00 50 was unremarkable, patient was resting comfortably no acute distress it was felt he was stable for discharge.  He will be discharged with symptomatic care.  And return precautions.    FINAL IMPRESSION  1.  Headache  2.  Viral syndrome  3.  Dyspnea         Electronically signed by: Baldo Singh D.O., 12/14/2022 10:01 PM      Dictation Disclaimer  Please note this report has been produced using speech recognition software and  may contain errors related to that system, including errors seen in grammar,  punctuation and spelling, as well as words and phrases that may be inappropriate.  If there are any questions or concerns, please feel  free to contact the dictating  physician for clarification.

## 2022-12-15 NOTE — DISCHARGE INSTRUCTIONS
If you develop any neck stiffness, numbness tingling weakness in any extremities, severe headache, symptoms do not improve return for recheck schedule Tylenol and ibuprofen as needed for your headaches, take the Imitrex as needed for migraine headaches

## 2022-12-15 NOTE — ED NOTES
Pt AOx4 and ready for education. All discharge instructions given to pt as well as Rx for Imitrex, Ibuprofen, and tylenol.  Pt advised not to drink or drive.  Pt verbalized understanding of all discharge instructions. All lines removed prior to discharge. All questions answered. Pt to lobby via ambulation.

## 2023-01-04 ENCOUNTER — HOSPITAL ENCOUNTER (OUTPATIENT)
Dept: RADIOLOGY | Facility: MEDICAL CENTER | Age: 48
End: 2023-01-04
Attending: INTERNAL MEDICINE
Payer: COMMERCIAL

## 2023-01-04 DIAGNOSIS — R94.39 ABNORMAL NUCLEAR STRESS TEST: ICD-10-CM

## 2023-01-04 PROCEDURE — 700117 HCHG RX CONTRAST REV CODE 255: Performed by: INTERNAL MEDICINE

## 2023-01-04 PROCEDURE — 75574 CT ANGIO HRT W/3D IMAGE: CPT

## 2023-01-04 PROCEDURE — A9270 NON-COVERED ITEM OR SERVICE: HCPCS | Performed by: INTERNAL MEDICINE

## 2023-01-04 PROCEDURE — 700102 HCHG RX REV CODE 250 W/ 637 OVERRIDE(OP): Performed by: INTERNAL MEDICINE

## 2023-01-04 RX ORDER — NITROGLYCERIN 0.4 MG/1
0.4 TABLET SUBLINGUAL ONCE
Status: COMPLETED | OUTPATIENT
Start: 2023-01-04 | End: 2023-01-04

## 2023-01-04 RX ADMIN — NITROGLYCERIN 0.4 MG: 0.4 TABLET, ORALLY DISINTEGRATING SUBLINGUAL at 08:36

## 2023-01-04 RX ADMIN — IOHEXOL 60 ML: 350 INJECTION, SOLUTION INTRAVENOUS at 11:30

## 2023-01-04 NOTE — PROGRESS NOTES
Patient had CCTA.  Patient brought to preparation room.   Verbal consent given by patient for PIV and administration of Nitroglycerin by RN.  PIV initiated, 20G right AC.    Review of medications, protocol, and NPO status completed.   Education provided to patient regarding examination.  Patient given baseline 3 lead EKG:   Vitals: 0810  BP: 148/100  HR: SR 61  RR: 14   100% RA     Patient ready for CT scan  Vitals: 0820  BP: 167/91  HR: SR 68  RR: 12  97% RA    Administration of Sublingual Nitroglycerin given per CCTA protocol at 0836--See MAR  Vitals: 0836  BP: 158/83  HR: SR 66  RR: 18  98% RA    Vitals: 0850  BP: 134/83  HR: SR 75  RR: 18  96% RA    Patient returned to preparation area where post procedure education given. PIV removed, patient provided with water.     Vitals returned to baseline. Patient states they are ready to go home. Discharge education provided. Discharged per protocol.     Patient is ambulatory, escorted by RN to [facility].

## 2023-01-17 DIAGNOSIS — I10 ESSENTIAL HYPERTENSION: ICD-10-CM

## 2023-01-17 DIAGNOSIS — I48.0 PAROXYSMAL ATRIAL FIBRILLATION (HCC): ICD-10-CM

## 2023-02-13 ENCOUNTER — TELEMEDICINE (OUTPATIENT)
Dept: TELEHEALTH | Facility: TELEMEDICINE | Age: 48
End: 2023-02-13
Payer: COMMERCIAL

## 2023-02-13 DIAGNOSIS — U07.1 COVID-19: ICD-10-CM

## 2023-02-13 PROCEDURE — 99213 OFFICE O/P EST LOW 20 MIN: CPT | Mod: 95 | Performed by: PHYSICIAN ASSISTANT

## 2023-02-13 ASSESSMENT — ENCOUNTER SYMPTOMS
WHEEZING: 0
MYALGIAS: 1
CHILLS: 1
FEVER: 1
COUGH: 1
HEADACHES: 1
HEMOPTYSIS: 0
PALPITATIONS: 0
SPUTUM PRODUCTION: 0
SHORTNESS OF BREATH: 1

## 2023-02-13 NOTE — PROGRESS NOTES
Subjective:   Jensen Donald is a 47 y.o. male who presents today with   Chief Complaint   Patient presents with    Cough       Cough  This is a new problem. Episode onset: 2 days. The problem has been unchanged. Associated symptoms include chills, a fever, headaches, myalgias and shortness of breath. Pertinent negatives include no chest pain, hemoptysis or wheezing. Treatments tried: tylenol, ibuprofen. The treatment provided mild relief.   This telemedicine encounter was conducted via ZOOM.  Verbal consent was obtained.  Patient was in a private location in the [facility].  Patient's identity was verified.    PMH:  has a past medical history of A-fib (HCC), Geographic tongue, and Hypertension.  MEDS:   Current Outpatient Medications:     Nirmatrelvir&Ritonavir 300/100 20 x 150 MG & 10 x 100MG Tablet Therapy Pack, Take 300 mg nirmatrelvir (two 150 mg tablets) with 100 mg ritonavir (one 100 mg tablet) by mouth, with all three tablets taken together twice daily for 5 days., Disp: 30 Each, Rfl: 0    acetaminophen (TYLENOL) 500 MG Tab, Take 1-2 Tablets by mouth every 6 hours as needed for Moderate Pain., Disp: 30 Tablet, Rfl: 0    ibuprofen (MOTRIN) 600 MG Tab, Take 1 Tablet by mouth every 6 hours as needed for Mild Pain or Moderate Pain., Disp: 30 Tablet, Rfl: 0    SUMAtriptan (IMITREX) 25 MG Tab tablet, Take 1-4 Tablets by mouth one time as needed for Migraine for up to 1 dose., Disp: 10 Tablet, Rfl: 3    losartan (COZAAR) 25 MG Tab, Take 1 Tablet by mouth every day., Disp: 30 Tablet, Rfl: 0    albuterol 108 (90 Base) MCG/ACT Aero Soln inhalation aerosol, Inhale 2 Puffs every 6 hours as needed for Shortness of Breath., Disp: 8.5 g, Rfl: 0    sumatriptan (IMITREX) 100 MG tablet, Take 1/2-1 tablet po at onset of headache, may repeat dose in 2 hours if unrelieved.  Do not exceed more than 2 tablets in 24 hours., Disp: 9 Tablet, Rfl: 11    pantoprazole (PROTONIX) 20 MG tablet, Take 20 mg by mouth every  day., Disp: , Rfl:     fluticasone (FLONASE) 50 MCG/ACT nasal spray, Spray 1 Spray in nose 2 times a day., Disp: 3 Bottle, Rfl: 1  ALLERGIES:   Allergies   Allergen Reactions    Diltiazem Hcl      Other reaction(s): Headache    Lisinopril      cough    Other Drug      Can't remember.  Few yrs ago. Anti-anxiety pill     SURGHX: No past surgical history on file.  SOCHX:  reports that he has quit smoking. His smoking use included cigarettes. He has a 0.25 pack-year smoking history. He has never used smokeless tobacco. He reports current alcohol use of about 1.8 oz per week. He reports that he does not currently use drugs after having used the following drugs: Marijuana.  FH: Reviewed with patient, not pertinent to this visit.     Review of Systems   Constitutional:  Positive for chills and fever.   Respiratory:  Positive for cough and shortness of breath. Negative for hemoptysis, sputum production and wheezing.    Cardiovascular:  Negative for chest pain and palpitations.   Musculoskeletal:  Positive for myalgias.   Neurological:  Positive for headaches.      Objective:   There were no vitals taken for this visit.  Physical Exam  Vitals and nursing note reviewed.   Constitutional:       General: He is not in acute distress.     Appearance: Normal appearance. He is not ill-appearing or toxic-appearing.   HENT:      Head: Normocephalic and atraumatic.      Right Ear: Hearing normal.      Left Ear: Hearing normal.      Nose: Nose normal.      Mouth/Throat:      Lips: Pink.   Eyes:      Conjunctiva/sclera: Conjunctivae normal.   Pulmonary:      Effort: Pulmonary effort is normal.      Comments: Unlabored breathing and patient is able to speak in full sentences.  Musculoskeletal:      Cervical back: Normal range of motion.      Comments: Range of motion in upper extremities is normal.   Neurological:      Mental Status: He is alert.      Coordination: Coordination normal.   Psychiatric:         Mood and Affect: Mood normal.        Assessment/Plan:   Assessment    1. COVID-19  - Nirmatrelvir&Ritonavir 300/100 20 x 150 MG & 10 x 100MG Tablet Therapy Pack; Take 300 mg nirmatrelvir (two 150 mg tablets) with 100 mg ritonavir (one 100 mg tablet) by mouth, with all three tablets taken together twice daily for 5 days.  Dispense: 30 Each; Refill: 0    Symptoms and presentation consistent with COVID at this time.  Vital signs are stable on exam today.  Discussed CDC guidelines including self isolation at home.   Patient encouraged to get plenty of rest, use OTC tylenol for pain/fever, and drink plenty of fluids.  Patient does meet criteria for antiviral therapy and is requesting this at this time.  He understands that it is emergency use authorized and is agreeable with potential side effects.    Differential diagnosis, natural history, supportive care, and indications for immediate follow-up discussed.   Patient given instructions and understanding of medications and treatment.    If not improving or any worsening of symptoms he will follow-up for in person visit and is understanding that we are limited with virtual visit at this time.  Patient agreeable to plan.      Please note that this dictation was created using voice recognition software. I have made every reasonable attempt to correct obvious errors, but I expect that there are errors of grammar and possibly content that I did not discover before finalizing the note.    Erick Ivey PA-C

## 2023-03-06 ENCOUNTER — OFFICE VISIT (OUTPATIENT)
Dept: CARDIOLOGY | Facility: MEDICAL CENTER | Age: 48
End: 2023-03-06
Payer: COMMERCIAL

## 2023-03-06 VITALS
SYSTOLIC BLOOD PRESSURE: 178 MMHG | BODY MASS INDEX: 29.61 KG/M2 | DIASTOLIC BLOOD PRESSURE: 102 MMHG | HEART RATE: 62 BPM | HEIGHT: 72 IN | RESPIRATION RATE: 17 BRPM | WEIGHT: 218.6 LBS | OXYGEN SATURATION: 98 %

## 2023-03-06 DIAGNOSIS — G43.109 MIGRAINE WITH AURA AND WITHOUT STATUS MIGRAINOSUS, NOT INTRACTABLE: ICD-10-CM

## 2023-03-06 DIAGNOSIS — I48.0 PAROXYSMAL ATRIAL FIBRILLATION (HCC): ICD-10-CM

## 2023-03-06 DIAGNOSIS — R93.1 HIGH CORONARY ARTERY CALCIUM SCORE: ICD-10-CM

## 2023-03-06 DIAGNOSIS — R00.2 PALPITATION: ICD-10-CM

## 2023-03-06 DIAGNOSIS — I49.1 PREMATURE ATRIAL CONTRACTIONS: ICD-10-CM

## 2023-03-06 DIAGNOSIS — U07.1 COVID-19: ICD-10-CM

## 2023-03-06 DIAGNOSIS — I10 ESSENTIAL HYPERTENSION: ICD-10-CM

## 2023-03-06 DIAGNOSIS — M94.0 COSTOCHONDRITIS: ICD-10-CM

## 2023-03-06 LAB — EKG IMPRESSION: NORMAL

## 2023-03-06 PROCEDURE — 99215 OFFICE O/P EST HI 40 MIN: CPT | Performed by: NURSE PRACTITIONER

## 2023-03-06 PROCEDURE — 93000 ELECTROCARDIOGRAM COMPLETE: CPT | Performed by: INTERNAL MEDICINE

## 2023-03-06 RX ORDER — FAMOTIDINE 20 MG
2000 TABLET ORAL DAILY
Qty: 60 CAPSULE | Refills: 0 | COMMUNITY
Start: 2023-03-06 | End: 2024-01-30

## 2023-03-06 RX ORDER — ATORVASTATIN CALCIUM 40 MG/1
40 TABLET, FILM COATED ORAL NIGHTLY
Qty: 90 TABLET | Refills: 3 | Status: SHIPPED | OUTPATIENT
Start: 2023-03-06 | End: 2023-04-17 | Stop reason: SDUPTHER

## 2023-03-06 RX ORDER — IBUPROFEN 400 MG/1
600 TABLET ORAL EVERY 6 HOURS PRN
Qty: 30 TABLET | COMMUNITY
Start: 2023-03-06 | End: 2024-01-30

## 2023-03-06 RX ORDER — LOSARTAN POTASSIUM 25 MG/1
25 TABLET ORAL DAILY
Qty: 90 TABLET | OUTPATIENT
Start: 2023-03-06

## 2023-03-06 RX ORDER — LOSARTAN POTASSIUM 25 MG/1
25 TABLET ORAL DAILY
Qty: 90 TABLET | Refills: 3 | Status: ON HOLD | OUTPATIENT
Start: 2023-03-06 | End: 2023-03-13 | Stop reason: SDUPTHER

## 2023-03-06 ASSESSMENT — FIBROSIS 4 INDEX: FIB4 SCORE: 0.6

## 2023-03-06 NOTE — Clinical Note
Today, patient reported costochondritis and paroxysmal SVT post-COVID.  Discussed supportive care as recently post-COVID.  Also started on aspirin and statin therapy for coronary calcium.  Not sure if you have any additional recommendations, in the meantime. He will see you in 6 weeks

## 2023-03-06 NOTE — PROGRESS NOTES
Chief Complaint   Patient presents with    Atrial Fibrillation     F/V DX: Paroxysmal atrial fibrillation (HCC)       Subjective     Jensen Donald is a 47 y.o. male who presents today elevated coronary calcium score after CTA on 1/4/2023.  Patient was last seen by Dr. Flowers on 10/10/2022.  Patient has additional medical problems of history of paroxysmal A-fib, hypertension, migraines.    Today, he reports developed COVID 2 weeks ago mostly asymptomatic with migraines completed antiviral therapy.  Since COVID, patient's been experiencing reproducible chest pain.  Describes as mild, sharp.  Patient has also been noting heart rate ranging from 60-150s which is abnormal for patient heart rate typically ranges 60 to 80s.  Patient having difficulty to resume previous exercise routine otherwise, Patient denies shortness of breath, palpitations, dizziness/lightheadedness, orthopnea, PND or Edema.     EKG in office personally interpreted by me as sinus rhythm with LVH.  Compared to previous EKG on 10/2022 no LVH changes.  No acute ST changes.    Patient hypertensive in office today.  Patient reports he stopped his losartan.  We also discussed his coronary CT results and Dr. Flowers's recommendations.  We discussed initiating ASA and statin therapy.  Beta-blocker contraindicated due to low resting heart rate.  We will have patient resume losartan.  We also discussed over-the-counter anti-inflammatory and vitamin D recommendations after COVID for symptom management.  Patient willing to try.  Patient will follow-up in 6 weeks with Dr. Flowers.  ER precautions discussed; patient verbalizes understanding.      Past Medical History:   Diagnosis Date    A-fib (HCC)     4 years ago, one episode only, echo/stress test normal    Geographic tongue     Hypertension      History reviewed. No pertinent surgical history.  Family History   Problem Relation Age of Onset    Diabetes Mother     Lung Disease Son      Social History      Socioeconomic History    Marital status:      Spouse name: Not on file    Number of children: Not on file    Years of education: Not on file    Highest education level: Not on file   Occupational History    Not on file   Tobacco Use    Smoking status: Former     Packs/day: 0.25     Years: 1.00     Pack years: 0.25     Types: Cigarettes    Smokeless tobacco: Never   Vaping Use    Vaping Use: Never used   Substance and Sexual Activity    Alcohol use: Yes     Alcohol/week: 1.8 oz     Types: 1 Glasses of wine, 1 Cans of beer, 1 Shots of liquor per week     Comment: Occ    Drug use: Not Currently     Types: Marijuana    Sexual activity: Yes     Partners: Female     Comment: wife, Fern   Other Topics Concern    Not on file   Social History Narrative    Not on file     Social Determinants of Health     Financial Resource Strain: Not on file   Food Insecurity: Not on file   Transportation Needs: Not on file   Physical Activity: Not on file   Stress: Not on file   Social Connections: Not on file   Intimate Partner Violence: Not on file   Housing Stability: Not on file     Allergies   Allergen Reactions    Diltiazem Hcl      Other reaction(s): Headache    Lisinopril      cough    Other Drug      Can't remember.  Few yrs ago. Anti-anxiety pill     Outpatient Encounter Medications as of 3/6/2023   Medication Sig Dispense Refill    aspirin EC (ECOTRIN) 81 MG Tablet Delayed Response Take 1 Tablet by mouth every day. 90 Tablet 3    atorvastatin (LIPITOR) 40 MG Tab Take 1 Tablet by mouth every evening. 90 Tablet 3    losartan (COZAAR) 25 MG Tab Take 1 Tablet by mouth every day. 90 Tablet 3    ibuprofen (MOTRIN) 400 MG Tab Take 1.5 Tablets by mouth every 6 hours as needed for Mild Pain. 30 Tablet     Vitamin D, Cholecalciferol, 25 MCG (1000 UT) Cap Take 2,000 Units by mouth every day. 60 Capsule 0    [DISCONTINUED] Nirmatrelvir&Ritonavir 300/100 20 x 150 MG & 10 x 100MG Tablet Therapy Pack Take 300 mg nirmatrelvir (two  150 mg tablets) with 100 mg ritonavir (one 100 mg tablet) by mouth, with all three tablets taken together twice daily for 5 days. (Patient not taking: Reported on 3/6/2023) 30 Each 0    [DISCONTINUED] acetaminophen (TYLENOL) 500 MG Tab Take 1-2 Tablets by mouth every 6 hours as needed for Moderate Pain. (Patient not taking: Reported on 3/6/2023) 30 Tablet 0    [DISCONTINUED] ibuprofen (MOTRIN) 600 MG Tab Take 1 Tablet by mouth every 6 hours as needed for Mild Pain or Moderate Pain. (Patient not taking: Reported on 3/6/2023) 30 Tablet 0    [DISCONTINUED] SUMAtriptan (IMITREX) 25 MG Tab tablet Take 1-4 Tablets by mouth one time as needed for Migraine for up to 1 dose. (Patient not taking: Reported on 3/6/2023) 10 Tablet 3    [DISCONTINUED] losartan (COZAAR) 25 MG Tab Take 1 Tablet by mouth every day. (Patient not taking: Reported on 3/6/2023) 30 Tablet 0    [DISCONTINUED] albuterol 108 (90 Base) MCG/ACT Aero Soln inhalation aerosol Inhale 2 Puffs every 6 hours as needed for Shortness of Breath. (Patient not taking: Reported on 3/6/2023) 8.5 g 0    [DISCONTINUED] sumatriptan (IMITREX) 100 MG tablet Take 1/2-1 tablet po at onset of headache, may repeat dose in 2 hours if unrelieved.  Do not exceed more than 2 tablets in 24 hours. (Patient not taking: Reported on 3/6/2023) 9 Tablet 11    [DISCONTINUED] pantoprazole (PROTONIX) 20 MG tablet Take 20 mg by mouth every day. (Patient not taking: Reported on 3/6/2023)      [DISCONTINUED] fluticasone (FLONASE) 50 MCG/ACT nasal spray Spray 1 Spray in nose 2 times a day. 3 Bottle 1     No facility-administered encounter medications on file as of 3/6/2023.     ROS complete review of systems negative except as noted above           Objective     BP (!) 178/102 (BP Location: Left arm, Patient Position: Sitting, BP Cuff Size: Adult)   Pulse 62   Resp 17   Ht 1.829 m (6')   Wt 99.2 kg (218 lb 9.6 oz)   SpO2 98%   BMI 29.65 kg/m²     Physical Exam  Vitals reviewed.    Constitutional:       Appearance: He is well-developed.   HENT:      Head: Normocephalic and atraumatic.   Eyes:      Pupils: Pupils are equal, round, and reactive to light.   Neck:      Vascular: No JVD.   Cardiovascular:      Rate and Rhythm: Normal rate and regular rhythm.      Pulses: Normal pulses.      Heart sounds: Normal heart sounds. No murmur heard.    No friction rub. No gallop.   Pulmonary:      Effort: Pulmonary effort is normal. No respiratory distress.      Breath sounds: Normal breath sounds.   Abdominal:      General: Bowel sounds are normal. There is no distension.      Palpations: Abdomen is soft.   Musculoskeletal:      Right lower leg: No edema.      Left lower leg: No edema.   Skin:     General: Skin is warm and dry.      Findings: No erythema.   Neurological:      Mental Status: He is alert and oriented to person, place, and time.   Psychiatric:         Behavior: Behavior normal.          Lab Results   Component Value Date/Time    CHOLSTRLTOT 175 03/28/2022 06:14 AM     (H) 03/28/2022 06:14 AM    HDL 42 03/28/2022 06:14 AM    TRIGLYCERIDE 92 03/28/2022 06:14 AM       Lab Results   Component Value Date/Time    SODIUM 137 12/14/2022 09:40 PM    POTASSIUM 3.9 12/14/2022 09:40 PM    CHLORIDE 103 12/14/2022 09:40 PM    CO2 24 12/14/2022 09:40 PM    GLUCOSE 95 12/14/2022 09:40 PM    BUN 22 12/14/2022 09:40 PM    CREATININE 0.79 12/14/2022 09:40 PM     Lab Results   Component Value Date/Time    ALKPHOSPHAT 71 12/14/2022 09:40 PM    ASTSGOT 14 12/14/2022 09:40 PM    ALTSGPT 21 12/14/2022 09:40 PM    TBILIRUBIN 0.6 12/14/2022 09:40 PM      EKG dated 10/10/2022: My personal interpretation is sinus rhythm     Echo dated 3/30/2022: My personal interpretation is normal left ventricular size and function.  LVEF 60%.  No intracardiac shunt     Exercise stress testing (7/5/2022):    CONCLUSIONS   Positive stress ECG for ischemia with horizontal ST depression in    anterolateral leads may be false  positive.   Excellent exercise capacity at 14.9 METS.   Normal blood pressure response.     Holter Monitor (4/1/2022):   Event Monitor Summary:  Time Monitored 14 days     1. Sinus rhythm with heart rate range from 36 to 142 bpm. Average heart rate 61 bpm.   2. Normal sinus node and AV node conduction normal. No pauses.   3. Rare PACs.   4. Rare PVCs.   5. No sustained rhythm changes. No atrial fibrillation/flutter.   6. Patient triggers were associated with sinus rhythm with heart rate range of 59-95 bpm.     Conclusion: Normal event monitor results      Cardiac event monitor (3/14/2022):   1.  No significant tiffany or tachyarrhythmias were detected.   2.  Rare PACs and rare PVCs were detected.   3.  There were 6 patient triggered events recorded.  Most patient events correlated with normal sinus rhythm.  One event possibly correlated with a PVC, although this may have been artifact.     Chest CTA (1//2023):   IMPRESSION:  1.  Coronary calcium score of 217.4.  2.  Triple vessel coronary artery disease, with mixed plaque in the mid LCx resulting in approximately 50% stenosis.  3.  Mild, predominantly calcific plaque in the LAD and RCA results in no hemodynamically significant stenosis.  Assessment & Plan     1. High coronary artery calcium score  EKG - Clinic Performed      2. Essential hypertension  EKG - Clinic Performed    losartan (COZAAR) 25 MG Tab      3. Paroxysmal atrial fibrillation (HCC)  EKG - Clinic Performed      4. Palpitation  EKG - Clinic Performed      5. Premature atrial contractions  EKG - Clinic Performed      6. Migraine with aura and without status migrainosus, not intractable  EKG - Clinic Performed      7. COVID-19        8. Costochondritis            Medical Decision Making: Today's Assessment/Status/Plan:        Multivessel CAC; HLD; HTN; Hx PAF; costochondritis s/p recent COVID illness  -Resume losartan 25 mg daily  -Initiate ASA and atorvastatin 40 milligrams daily  -Continue supportive  care post COVID-with over-the-counter NSAID and vitamin D.  Discussed moderate to low intensity physical activity for now.  If symptoms do not resolve by 6 weeks, consider additional diagnostics (repeat cardiac event monitoring, echo) and optimizing medical therapy.    FU in clinic in 6 weeks with Dr. Flowers. Sooner if needed.    Patient verbalizes understanding and agrees with the plan of care.     I personally spent a total of 45 minutes which includes face-to-face time and non-face-to-face time spent on preparing to see the patient, reviewing prior notes and tests, obtaining history from the patient, performing a medically appropriate exam, counseling and educating the patient, ordering medications/tests/procedures/referrals as clinically indicated, and documenting information in the electronic medical record.    KIERSTEN MonteroRSOULEYMANE.   [facility]  [phone]    PLEASE NOTE: This Note was created using voice recognition Software. I have made every reasonable attempt to correct obvious errors, but I expect that there are errors of grammar and possibly content that I did not discover before finalizing the note

## 2023-03-12 ENCOUNTER — HOSPITAL ENCOUNTER (OUTPATIENT)
Facility: MEDICAL CENTER | Age: 48
End: 2023-03-13
Attending: EMERGENCY MEDICINE | Admitting: INTERNAL MEDICINE
Payer: COMMERCIAL

## 2023-03-12 ENCOUNTER — APPOINTMENT (OUTPATIENT)
Dept: CARDIOLOGY | Facility: MEDICAL CENTER | Age: 48
End: 2023-03-12
Attending: INTERNAL MEDICINE
Payer: COMMERCIAL

## 2023-03-12 ENCOUNTER — APPOINTMENT (OUTPATIENT)
Dept: RADIOLOGY | Facility: MEDICAL CENTER | Age: 48
End: 2023-03-12
Attending: EMERGENCY MEDICINE
Payer: COMMERCIAL

## 2023-03-12 DIAGNOSIS — I10 HYPERTENSION, UNSPECIFIED TYPE: ICD-10-CM

## 2023-03-12 DIAGNOSIS — R00.2 PALPITATION: ICD-10-CM

## 2023-03-12 DIAGNOSIS — R00.2 PALPITATIONS: ICD-10-CM

## 2023-03-12 DIAGNOSIS — I10 ESSENTIAL HYPERTENSION: ICD-10-CM

## 2023-03-12 PROBLEM — I25.10 CORONARY ARTERY CALCIFICATION OF NATIVE ARTERY: Status: ACTIVE | Noted: 2023-03-12

## 2023-03-12 PROBLEM — I25.84 CORONARY ARTERY CALCIFICATION OF NATIVE ARTERY: Status: ACTIVE | Noted: 2023-03-12

## 2023-03-12 LAB
ALBUMIN SERPL BCP-MCNC: 4.2 G/DL (ref 3.2–4.9)
ALBUMIN/GLOB SERPL: 1.8 G/DL
ALP SERPL-CCNC: 68 U/L (ref 30–99)
ALT SERPL-CCNC: 30 U/L (ref 2–50)
AMPHET UR QL SCN: NEGATIVE
ANION GAP SERPL CALC-SCNC: 11 MMOL/L (ref 7–16)
AST SERPL-CCNC: 12 U/L (ref 12–45)
BARBITURATES UR QL SCN: NEGATIVE
BASOPHILS # BLD AUTO: 0.5 % (ref 0–1.8)
BASOPHILS # BLD: 0.02 K/UL (ref 0–0.12)
BENZODIAZ UR QL SCN: NEGATIVE
BILIRUB SERPL-MCNC: 1.1 MG/DL (ref 0.1–1.5)
BUN SERPL-MCNC: 11 MG/DL (ref 8–22)
BZE UR QL SCN: NEGATIVE
CALCIUM ALBUM COR SERPL-MCNC: 9.1 MG/DL (ref 8.5–10.5)
CALCIUM SERPL-MCNC: 9.3 MG/DL (ref 8.5–10.5)
CANNABINOIDS UR QL SCN: NEGATIVE
CHLORIDE SERPL-SCNC: 104 MMOL/L (ref 96–112)
CHOLEST SERPL-MCNC: 132 MG/DL (ref 100–199)
CO2 SERPL-SCNC: 22 MMOL/L (ref 20–33)
CORTIS SERPL-MCNC: 11 UG/DL (ref 0–23)
CREAT SERPL-MCNC: 0.79 MG/DL (ref 0.5–1.4)
D DIMER PPP IA.FEU-MCNC: <0.27 UG/ML (FEU) (ref 0–0.5)
EKG IMPRESSION: NORMAL
EKG IMPRESSION: NORMAL
EOSINOPHIL # BLD AUTO: 0.11 K/UL (ref 0–0.51)
EOSINOPHIL NFR BLD: 2.7 % (ref 0–6.9)
ERYTHROCYTE [DISTWIDTH] IN BLOOD BY AUTOMATED COUNT: 39.7 FL (ref 35.9–50)
GFR SERPLBLD CREATININE-BSD FMLA CKD-EPI: 110 ML/MIN/1.73 M 2
GLOBULIN SER CALC-MCNC: 2.4 G/DL (ref 1.9–3.5)
GLUCOSE SERPL-MCNC: 101 MG/DL (ref 65–99)
HCT VFR BLD AUTO: 43.9 % (ref 42–52)
HDLC SERPL-MCNC: 53 MG/DL
HGB BLD-MCNC: 14.9 G/DL (ref 14–18)
IMM GRANULOCYTES # BLD AUTO: 0.01 K/UL (ref 0–0.11)
IMM GRANULOCYTES NFR BLD AUTO: 0.2 % (ref 0–0.9)
LDLC SERPL CALC-MCNC: 63 MG/DL
LV EJECT FRACT  99904: 60
LV EJECT FRACT MOD 2C 99903: 52.05
LV EJECT FRACT MOD 4C 99902: 50.49
LV EJECT FRACT MOD BP 99901: 52.19
LYMPHOCYTES # BLD AUTO: 1.18 K/UL (ref 1–4.8)
LYMPHOCYTES NFR BLD: 29 % (ref 22–41)
MAGNESIUM SERPL-MCNC: 1.9 MG/DL (ref 1.5–2.5)
MCH RBC QN AUTO: 30.3 PG (ref 27–33)
MCHC RBC AUTO-ENTMCNC: 33.9 G/DL (ref 33.7–35.3)
MCV RBC AUTO: 89.2 FL (ref 81.4–97.8)
METHADONE UR QL SCN: NEGATIVE
MONOCYTES # BLD AUTO: 0.29 K/UL (ref 0–0.85)
MONOCYTES NFR BLD AUTO: 7.1 % (ref 0–13.4)
NEUTROPHILS # BLD AUTO: 2.46 K/UL (ref 1.82–7.42)
NEUTROPHILS NFR BLD: 60.5 % (ref 44–72)
NRBC # BLD AUTO: 0 K/UL
NRBC BLD-RTO: 0 /100 WBC
OPIATES UR QL SCN: NEGATIVE
OXYCODONE UR QL SCN: NEGATIVE
PCP UR QL SCN: NEGATIVE
PLATELET # BLD AUTO: 169 K/UL (ref 164–446)
PMV BLD AUTO: 10.6 FL (ref 9–12.9)
POTASSIUM SERPL-SCNC: 4 MMOL/L (ref 3.6–5.5)
PROPOXYPH UR QL SCN: NEGATIVE
PROT SERPL-MCNC: 6.6 G/DL (ref 6–8.2)
RBC # BLD AUTO: 4.92 M/UL (ref 4.7–6.1)
SODIUM SERPL-SCNC: 137 MMOL/L (ref 135–145)
TRIGL SERPL-MCNC: 82 MG/DL (ref 0–149)
TROPONIN T SERPL-MCNC: 6 NG/L (ref 6–19)
TROPONIN T SERPL-MCNC: <6 NG/L (ref 6–19)
TSH SERPL DL<=0.005 MIU/L-ACNC: 2.01 UIU/ML (ref 0.38–5.33)
WBC # BLD AUTO: 4.1 K/UL (ref 4.8–10.8)

## 2023-03-12 PROCEDURE — 99254 IP/OBS CNSLTJ NEW/EST MOD 60: CPT | Performed by: INTERNAL MEDICINE

## 2023-03-12 PROCEDURE — 83735 ASSAY OF MAGNESIUM: CPT

## 2023-03-12 PROCEDURE — 84484 ASSAY OF TROPONIN QUANT: CPT | Mod: 91

## 2023-03-12 PROCEDURE — 99223 1ST HOSP IP/OBS HIGH 75: CPT | Performed by: INTERNAL MEDICINE

## 2023-03-12 PROCEDURE — 99285 EMERGENCY DEPT VISIT HI MDM: CPT

## 2023-03-12 PROCEDURE — 93005 ELECTROCARDIOGRAM TRACING: CPT | Performed by: EMERGENCY MEDICINE

## 2023-03-12 PROCEDURE — 71045 X-RAY EXAM CHEST 1 VIEW: CPT

## 2023-03-12 PROCEDURE — G0378 HOSPITAL OBSERVATION PER HR: HCPCS

## 2023-03-12 PROCEDURE — 93306 TTE W/DOPPLER COMPLETE: CPT

## 2023-03-12 PROCEDURE — 93005 ELECTROCARDIOGRAM TRACING: CPT

## 2023-03-12 PROCEDURE — 84443 ASSAY THYROID STIM HORMONE: CPT

## 2023-03-12 PROCEDURE — A9270 NON-COVERED ITEM OR SERVICE: HCPCS | Performed by: INTERNAL MEDICINE

## 2023-03-12 PROCEDURE — A9270 NON-COVERED ITEM OR SERVICE: HCPCS | Performed by: EMERGENCY MEDICINE

## 2023-03-12 PROCEDURE — 80307 DRUG TEST PRSMV CHEM ANLYZR: CPT

## 2023-03-12 PROCEDURE — 700102 HCHG RX REV CODE 250 W/ 637 OVERRIDE(OP): Performed by: EMERGENCY MEDICINE

## 2023-03-12 PROCEDURE — 82533 TOTAL CORTISOL: CPT

## 2023-03-12 PROCEDURE — 80061 LIPID PANEL: CPT

## 2023-03-12 PROCEDURE — 80053 COMPREHEN METABOLIC PANEL: CPT

## 2023-03-12 PROCEDURE — 85025 COMPLETE CBC W/AUTO DIFF WBC: CPT

## 2023-03-12 PROCEDURE — 85379 FIBRIN DEGRADATION QUANT: CPT

## 2023-03-12 PROCEDURE — 700102 HCHG RX REV CODE 250 W/ 637 OVERRIDE(OP): Performed by: INTERNAL MEDICINE

## 2023-03-12 PROCEDURE — 36415 COLL VENOUS BLD VENIPUNCTURE: CPT

## 2023-03-12 PROCEDURE — 93306 TTE W/DOPPLER COMPLETE: CPT | Mod: 26 | Performed by: INTERNAL MEDICINE

## 2023-03-12 RX ORDER — BISACODYL 10 MG
10 SUPPOSITORY, RECTAL RECTAL
Status: DISCONTINUED | OUTPATIENT
Start: 2023-03-12 | End: 2023-03-13 | Stop reason: HOSPADM

## 2023-03-12 RX ORDER — ONDANSETRON 2 MG/ML
4 INJECTION INTRAMUSCULAR; INTRAVENOUS EVERY 4 HOURS PRN
Status: DISCONTINUED | OUTPATIENT
Start: 2023-03-12 | End: 2023-03-13 | Stop reason: HOSPADM

## 2023-03-12 RX ORDER — PROMETHAZINE HYDROCHLORIDE 25 MG/1
12.5-25 SUPPOSITORY RECTAL EVERY 4 HOURS PRN
Status: DISCONTINUED | OUTPATIENT
Start: 2023-03-12 | End: 2023-03-13 | Stop reason: HOSPADM

## 2023-03-12 RX ORDER — CARVEDILOL 12.5 MG/1
12.5 TABLET ORAL ONCE
Status: COMPLETED | OUTPATIENT
Start: 2023-03-12 | End: 2023-03-12

## 2023-03-12 RX ORDER — PROCHLORPERAZINE EDISYLATE 5 MG/ML
5-10 INJECTION INTRAMUSCULAR; INTRAVENOUS EVERY 4 HOURS PRN
Status: DISCONTINUED | OUTPATIENT
Start: 2023-03-12 | End: 2023-03-13 | Stop reason: HOSPADM

## 2023-03-12 RX ORDER — CARVEDILOL 6.25 MG/1
6.25 TABLET ORAL 2 TIMES DAILY WITH MEALS
Status: DISCONTINUED | OUTPATIENT
Start: 2023-03-12 | End: 2023-03-13

## 2023-03-12 RX ORDER — PROMETHAZINE HYDROCHLORIDE 25 MG/1
12.5-25 TABLET ORAL EVERY 4 HOURS PRN
Status: DISCONTINUED | OUTPATIENT
Start: 2023-03-12 | End: 2023-03-13 | Stop reason: HOSPADM

## 2023-03-12 RX ORDER — VITAMIN B COMPLEX
2000 TABLET ORAL DAILY
Status: DISCONTINUED | OUTPATIENT
Start: 2023-03-13 | End: 2023-03-13 | Stop reason: HOSPADM

## 2023-03-12 RX ORDER — ACETAMINOPHEN 325 MG/1
650 TABLET ORAL EVERY 6 HOURS PRN
Status: DISCONTINUED | OUTPATIENT
Start: 2023-03-12 | End: 2023-03-13 | Stop reason: HOSPADM

## 2023-03-12 RX ORDER — AMOXICILLIN 250 MG
2 CAPSULE ORAL 2 TIMES DAILY
Status: DISCONTINUED | OUTPATIENT
Start: 2023-03-12 | End: 2023-03-13 | Stop reason: HOSPADM

## 2023-03-12 RX ORDER — ATORVASTATIN CALCIUM 40 MG/1
40 TABLET, FILM COATED ORAL EVERY EVENING
Status: DISCONTINUED | OUTPATIENT
Start: 2023-03-12 | End: 2023-03-13 | Stop reason: HOSPADM

## 2023-03-12 RX ORDER — ONDANSETRON 4 MG/1
4 TABLET, ORALLY DISINTEGRATING ORAL EVERY 4 HOURS PRN
Status: DISCONTINUED | OUTPATIENT
Start: 2023-03-12 | End: 2023-03-13 | Stop reason: HOSPADM

## 2023-03-12 RX ORDER — POLYETHYLENE GLYCOL 3350 17 G/17G
1 POWDER, FOR SOLUTION ORAL
Status: DISCONTINUED | OUTPATIENT
Start: 2023-03-12 | End: 2023-03-13 | Stop reason: HOSPADM

## 2023-03-12 RX ORDER — LABETALOL HYDROCHLORIDE 5 MG/ML
10 INJECTION, SOLUTION INTRAVENOUS EVERY 4 HOURS PRN
Status: DISCONTINUED | OUTPATIENT
Start: 2023-03-12 | End: 2023-03-13 | Stop reason: HOSPADM

## 2023-03-12 RX ORDER — LOSARTAN POTASSIUM 50 MG/1
50 TABLET ORAL ONCE
Status: COMPLETED | OUTPATIENT
Start: 2023-03-12 | End: 2023-03-12

## 2023-03-12 RX ORDER — LOSARTAN POTASSIUM 50 MG/1
25 TABLET ORAL
Status: DISCONTINUED | OUTPATIENT
Start: 2023-03-13 | End: 2023-03-13 | Stop reason: HOSPADM

## 2023-03-12 RX ADMIN — ATORVASTATIN CALCIUM 40 MG: 40 TABLET, FILM COATED ORAL at 18:31

## 2023-03-12 RX ADMIN — LOSARTAN POTASSIUM 50 MG: 50 TABLET, FILM COATED ORAL at 10:50

## 2023-03-12 RX ADMIN — CARVEDILOL 6.25 MG: 6.25 TABLET, FILM COATED ORAL at 18:31

## 2023-03-12 RX ADMIN — CARVEDILOL 12.5 MG: 12.5 TABLET, FILM COATED ORAL at 12:03

## 2023-03-12 RX ADMIN — ASPIRIN 81 MG: 81 TABLET, COATED ORAL at 16:54

## 2023-03-12 ASSESSMENT — LIFESTYLE VARIABLES
ON A TYPICAL DAY WHEN YOU DRINK ALCOHOL HOW MANY DRINKS DO YOU HAVE: 0
EVER FELT BAD OR GUILTY ABOUT YOUR DRINKING: NO
HOW MANY TIMES IN THE PAST YEAR HAVE YOU HAD 5 OR MORE DRINKS IN A DAY: 0
CONSUMPTION TOTAL: NEGATIVE
HAVE PEOPLE ANNOYED YOU BY CRITICIZING YOUR DRINKING: NO
HAVE PEOPLE ANNOYED YOU BY CRITICIZING YOUR DRINKING: NO
EVER HAD A DRINK FIRST THING IN THE MORNING TO STEADY YOUR NERVES TO GET RID OF A HANGOVER: NO
TOTAL SCORE: 0
ALCOHOL_USE: NO
TOTAL SCORE: 0
TOTAL SCORE: 0
HAVE YOU EVER FELT YOU SHOULD CUT DOWN ON YOUR DRINKING: NO
TOTAL SCORE: 0
CONSUMPTION TOTAL: INCOMPLETE
AVERAGE NUMBER OF DAYS PER WEEK YOU HAVE A DRINK CONTAINING ALCOHOL: 0
TOTAL SCORE: 0
HAVE YOU EVER FELT YOU SHOULD CUT DOWN ON YOUR DRINKING: NO
EVER HAD A DRINK FIRST THING IN THE MORNING TO STEADY YOUR NERVES TO GET RID OF A HANGOVER: NO
EVER FELT BAD OR GUILTY ABOUT YOUR DRINKING: NO
TOTAL SCORE: 0
ALCOHOL_USE: NO

## 2023-03-12 ASSESSMENT — PATIENT HEALTH QUESTIONNAIRE - PHQ9
SUM OF ALL RESPONSES TO PHQ9 QUESTIONS 1 AND 2: 0
1. LITTLE INTEREST OR PLEASURE IN DOING THINGS: NOT AT ALL
2. FEELING DOWN, DEPRESSED, IRRITABLE, OR HOPELESS: NOT AT ALL

## 2023-03-12 ASSESSMENT — COPD QUESTIONNAIRES
DURING THE PAST 4 WEEKS HOW MUCH DID YOU FEEL SHORT OF BREATH: NONE/LITTLE OF THE TIME
DO YOU EVER COUGH UP ANY MUCUS OR PHLEGM?: NO/ONLY WITH OCCASIONAL COLDS OR INFECTIONS
COPD SCREENING SCORE: 2
HAVE YOU SMOKED AT LEAST 100 CIGARETTES IN YOUR ENTIRE LIFE: YES

## 2023-03-12 ASSESSMENT — FIBROSIS 4 INDEX
FIB4 SCORE: 0.61
FIB4 SCORE: 0.6

## 2023-03-12 NOTE — ASSESSMENT & PLAN NOTE
- He was found to have a coronary calcium score of 217.4, following a false positive exercise stress test last year.  -Resume aspirin, and Lipitor.  Check lipid profile.  -He will also be on Coreg, and Cozaar.  -Monitor on telemetry.

## 2023-03-12 NOTE — ED TRIAGE NOTES
Chief Complaint   Patient presents with    Palpitations     Pt had an episode of rapid HR, accompanied with SOB.       Pt started atorvastatin and losartan last Monday.      EKG and CP protocol initiated.  Has had intermittent episodes of tachycardia and one time a-fib over last year.

## 2023-03-12 NOTE — ASSESSMENT & PLAN NOTE
- Appears to be uncontrolled.  Initial blood pressure 190/92.  -Needs to optimize blood pressure control.  Start Coreg 12.5 mg twice daily for his palpitations, and resume Cozaar 25 mg daily.  Monitor blood pressure trend closely with as needed IV labetalol for significant hypertension.

## 2023-03-12 NOTE — ED PROVIDER NOTES
"ED Provider Note    Scribed for Janet Mejia M.D. by Max Mercado. 3/12/2023, 10:04 AM.    Primary care provider: Rosalee Vasquez M.D.  Means of arrival: walk in  History obtained from: patient  History limited by: none    CHIEF COMPLAINT  Chief Complaint   Patient presents with    Palpitations     Pt had an episode of rapid HR, accompanied with SOB.         HPI/ROS  Jensen Donald is a 47 y.o. male who presents to the Emergency Department for palpitations onset today. He was working out when his heart rate would sharply increase, he noted associated shortness of breath with his increased rate.     Patient reports that he was diagnosed with COVID-19 approximately a month ago and has been recovering from this.  Since then he has had elevated heart rate with even just normal things such as walking.  Sometimes his heart rate would get into the 130s.  He does have a remote history of atrial fibrillation that was thought to be secondary to supplement use, atrial fibrillation resolved after stopping supplements.  He also has a history of hypertension but has been off of medications as hypertension was controlled with diet and exercise.    Due to his current symptoms he followed up in cardiology clinic on 3/6/2023.  He was restarted on his losartan but feels it is not helping his blood pressure as he has been checking his blood pressure every morning and it has been elevated.  He is currently taking aspirin. He has not had an episode of Afib for over a decade.  Today his blood pressure was high and his heart felt like it was \"beating out of his chest\".  He states that right now he can \"feel his heartbeat\".    EXTERNAL RECORDS REVIEWED  History of paroxysmal Afib, hypertension, hyperlipidemia. Takes aspirin, atorvastatin and losartan. Was recently seen in cardiology clinic on 3/6/23 for chest pain after a recent COVID infection. EKG done showed left ventricular hypertrophy. Last had an ultrasound of his heart " 3/30/22 which was nromal, EF of 60%. Stress test in July of 2022 demonstrated some ischemia although was thought to be a false positive.  To follow this up he had a coronary CTA on 1/4/23 had a Ca score of 217.     LIMITATION TO HISTORY   Select: : None    OUTSIDE HISTORIAN(S):  Significant other Wife provided collateral information      PAST MEDICAL HISTORY   has a past medical history of A-fib (HCC), Geographic tongue, and Hypertension.    SURGICAL HISTORY  patient denies any surgical history    SOCIAL HISTORY  Social History     Tobacco Use    Smoking status: Former     Packs/day: 0.25     Years: 1.00     Pack years: 0.25     Types: Cigarettes    Smokeless tobacco: Never   Vaping Use    Vaping Use: Never used   Substance Use Topics    Alcohol use: Yes     Alcohol/week: 1.8 oz     Types: 1 Glasses of wine, 1 Cans of beer, 1 Shots of liquor per week     Comment: Occ    Drug use: Not Currently     Types: Marijuana      Social History     Substance and Sexual Activity   Drug Use Not Currently    Types: Marijuana       FAMILY HISTORY  Family History   Problem Relation Age of Onset    Diabetes Mother     Lung Disease Son        CURRENT MEDICATIONS  Home Medications       Reviewed by Joseline Alvares (Pharmacy Tech) on 03/12/23 at 1144  Med List Status: Complete     Medication Last Dose Status   aspirin EC (ECOTRIN) 81 MG Tablet Delayed Response 3/11/2023 Active   atorvastatin (LIPITOR) 40 MG Tab 3/11/2023 Active   ibuprofen (MOTRIN) 400 MG Tab 3/11/2023 Active   losartan (COZAAR) 25 MG Tab 3/11/2023 Active   Vitamin D, Cholecalciferol, 25 MCG (1000 UT) Cap 3/11/2023 Active                    ALLERGIES  Allergies   Allergen Reactions    Diltiazem Hcl      Other reaction(s): Headache    Lisinopril      cough    Other Drug      Can't remember.  Few yrs ago. Anti-anxiety pill       PHYSICAL EXAM  VITAL SIGNS: BP (!) 176/90   Pulse 65   Temp 36.7 °C (98 °F)   Resp 18   Wt 95.6 kg (210 lb 12.2 oz)   SpO2 99%   BMI  28.58 kg/m²   Vitals reviewed by myself.  Nursing note and vitals reviewed.  Constitutional: Well-developed and well-nourished. No acute distress.   HENT: Head is normocephalic and atraumatic.  Eyes: extra-ocular movements intact  Cardiovascular: Regular rate and  regular rhythm. No murmur heard.  Pulmonary/Chest: Breath sounds normal. No wheezes or rales.   Abdominal: Soft and non-tender. No distention.    Musculoskeletal: Extremities exhibit normal range of motion without edema or tenderness.   Neurological: Awake and alert  Skin: Skin is warm and dry. No rash.      DIAGNOSTIC STUDIES:  LABS  Labs Reviewed   CBC WITH DIFFERENTIAL - Abnormal; Notable for the following components:       Result Value    WBC 4.1 (*)     All other components within normal limits    Narrative:     Biotin intake of greater than 5 mg per day may interfere with  troponin levels, causing false low values.   COMP METABOLIC PANEL - Abnormal; Notable for the following components:    Glucose 101 (*)     All other components within normal limits    Narrative:     Biotin intake of greater than 5 mg per day may interfere with  troponin levels, causing false low values.   TROPONIN    Narrative:     Biotin intake of greater than 5 mg per day may interfere with  troponin levels, causing false low values.   D-DIMER   CORRECTED CALCIUM    Narrative:     Biotin intake of greater than 5 mg per day may interfere with  troponin levels, causing false low values.   ESTIMATED GFR    Narrative:     Biotin intake of greater than 5 mg per day may interfere with  troponin levels, causing false low values.   TROPONIN    Narrative:     Biotin intake of greater than 5 mg per day may interfere with  troponin levels, causing false low values.   TSH WITH REFLEX TO FT4   CORTISOL   URINE DRUG SCREEN   LIPID PROFILE    Narrative:     Biotin intake of greater than 5 mg per day may interfere with  troponin levels, causing false low values.   MAGNESIUM    Narrative:      Biotin intake of greater than 5 mg per day may interfere with  troponin levels, causing false low values.       All labs reviewed and independently interpreted by myself    EKG Interpretation:    12 Lead EKG independently interpreted by myself to show:  EKG at 9:42 AM: Normal sinus rhythm, heart rate 68, normal axis, normal intervals, , QRS 91, QTc 428, no acute ST-T segment changes, no evidence of acute arrhythmia or ischemia, left ventricular hypertrophy is present, this is unchanged when compared to prior EKG from 3/6/2023    RADIOLOGY      DX-CHEST-PORTABLE (1 VIEW)   Final Result      No acute cardiac or pulmonary abnormalities are identified.        The radiologist's interpretation of all radiological studies have been reviewed by me.    COURSE & MEDICAL DECISION MAKING    ED Observation Status?  No    INITIAL ASSESSMENT AND PLAN    Patient is a 47-year-old male who presents for evaluation of hypertension and palpitations.  Differential diagnosis includes arrhythmia, uncontrolled hypertension, hypertensive urgency, ACS, pulmonary embolism.  Diagnostic work-up includes labs, EKG and chest x-ray.       ER COURSE AND FINAL DISPOSITION   Patient's initial vitals are notable for hypertension with systolic blood pressure of 190.  I will try giving him an increased dose of losartan to see if this helps his symptoms.  He is treated with losartan 50 mg in the emergency department.  EKG returns and demonstrates no evidence of acute arrhythmia or ischemia.  Left ventricular hypertrophy is present.  Labs returned and are independently interpreted by myself to demonstrate:    -CBC demonstrates no acute findings, slight leukopenia  -D-dimer is within normal limits ruling out pulmonary embolism  -Troponin is within normal limits, patient has a heart score of 3, low suspicion for acute coronary syndrome  -Electrolytes and renal function are within normal limits    In the emergency department patient is noted to have no  episodes of arrhythmia or tachycardia.  Given his abnormal calcium score and uncontrolled blood pressure I discussed the case with cardiologist Dr. Pena.  He advises keeping patient in the hospital for ongoing blood pressure control, recommends carvedilol initiation as patient is experiencing palpitations as well.  Cardiology will follow with the patient.  I discussed the case with Dr. Mclain who has accepted patient for hospitalization.  Patient is in guarded condition.    ADDITIONAL PROBLEM LIST AND RESOURCE UTILIZATION    Additional problems aside from the chief complaint that I have addressed: None    I have discussed management of the patient with the following physicians and UYEN's: Dr. Pena, Dr. Mclain    Discussion of management with other [facility] or appropriate source(s): None    Escalation of care considered, and ultimately not performed: See above.     Barriers to care at this time, including but not limited to: None.     Decision tools and prescription drugs considered including, but not limited to: HEART Score 3 .    Please see review of records as noted above      FINAL IMPRESSION  1. Palpitations    2. Hypertension, unspecified type          I, Max Mercado (Anna), am scribing for, and in the presence of, Janet Mejia M.D..    Electronically signed by: Max Mercado (Anna), 3/12/2023    I, Janet Mejia M.D. personally performed the services described in this documentation, as scribed by Max Mercado in my presence, and it is both accurate and complete.    The note accurately reflects work and decisions made by me.  Janet Mejia M.D.  3/12/2023  1:09 PM

## 2023-03-12 NOTE — H&P
Hospital Medicine History & Physical Note    Date of Service  3/12/2023    Primary Care Physician  Rosalee Vasquez M.D.    Consultants  cardiology    Specialist Names: Dr. Tristan    Code Status  Full Code    Chief Complaint  Chief Complaint   Patient presents with    Palpitations     Pt had an episode of rapid HR, accompanied with SOB.         History of Presenting Illness  Jensen Donald is a 47 y.o. male with history of hypertension, remote history of atrial fibrillation in his 30s which was attributed to dietary supplement and systemic steroids for bodybuilding, otherwise in a good state of health, exercises regularly, until he had COVID-19 infection approximately about 2-2 and half weeks ago with minimal symptoms.  Shortly after recovering from COVID-19, he started to noted spikes in his heart rate as indicated by his smart watch, with heart rate spiking to the 130s, which not associated with exertion or any activity.  He remained asymptomatic from it.  However this morning, he was just walking down strenuously when he started to feel palpitations, with associated lightheadedness, and shortness of breath.  He denied any chest pain.  He had no other complaints.  He then decided to go to the ED.    Notably, he followed with outpatient cardiology last year for a false positive stress test, prompting a coronary calcium CT which gave a score of 217 and for which he was started on aspirin and statin.  For some time, he was not taking any antihypertensives as his hypertension was diet and lifestyle controlled, however on cardiology follow-up he was restarted on losartan due to his blood pressure trending up.  He was restarted on losartan as well as his blood pressure running high.    ED course:  On evaluation, he was noted to have elevated blood pressure of 190/92.  Heart rate was 77.  EKG showed normal sinus rhythm and LVH with no dynamic ischemic changes (my read).  Chest x-ray showed nothing acute  (personally reviewed).  Labs are unremarkable.  CBC and BMP were normal.  Troponin was negative, and D-dimer was low.  Cardiology was consulted who recommended starting him on Coreg in addition to his losartan and titrating medications to normal blood pressure, along with cardiac monitoring.  Patient was subsequently admitted to the CDU.    I discussed the plan of care with patient, family, bedside RN, and ERP .    Review of Systems  ROS    Pertinent positives/negatives as mentioned above.     A complete review of systems was personally done by me. All other systems were negative.       Past Medical History   has a past medical history of A-fib (HCC), Geographic tongue, and Hypertension.    Surgical History  No past surgical history.    Family History  family history includes Diabetes in his mother; Lung Disease in his son.       Social History   reports that he has quit smoking. His smoking use included cigarettes. He has a 0.25 pack-year smoking history. He has never used smokeless tobacco. He reports current alcohol use of about 1.8 oz per week. He reports that he does not currently use drugs after having used the following drugs: Marijuana.    Allergies  Allergies   Allergen Reactions    Diltiazem Hcl      Other reaction(s): Headache    Lisinopril      cough    Other Drug      Can't remember.  Few yrs ago. Anti-anxiety pill       Medications  Prior to Admission Medications   Prescriptions Last Dose Informant Patient Reported? Taking?   Vitamin D, Cholecalciferol, 25 MCG (1000 UT) Cap 3/11/2023 at AM Patient Yes No   Sig: Take 2,000 Units by mouth every day.   aspirin EC (ECOTRIN) 81 MG Tablet Delayed Response 3/11/2023 at AM Patient No No   Sig: Take 1 Tablet by mouth every day.   atorvastatin (LIPITOR) 40 MG Tab 3/11/2023 at PM Patient No No   Sig: Take 1 Tablet by mouth every evening.   ibuprofen (MOTRIN) 400 MG Tab 3/11/2023 at PM Patient Yes No   Sig: Take 1.5 Tablets by mouth every 6 hours as needed for  Mild Pain.   losartan (COZAAR) 25 MG Tab 3/11/2023 at AM Patient No No   Sig: Take 1 Tablet by mouth every day.      Facility-Administered Medications: None       Physical Exam  Temp:  [36.7 °C (98 °F)] 36.7 °C (98 °F)  Pulse:  [58-77] 61  Resp:  [16-20] 20  BP: (152-190)/(75-92) 156/84  SpO2:  [97 %-99 %] 97 %  Blood Pressure: (!) 156/84   Temperature: 36.7 °C (98 °F)   Pulse: 61   Respiration: 20   Pulse Oximetry: 97 %       Physical Exam  Vitals reviewed.   Constitutional:       General: He is not in acute distress.     Appearance: Normal appearance. He is not toxic-appearing or diaphoretic.   HENT:      Head: Normocephalic and atraumatic.      Right Ear: External ear normal.      Left Ear: External ear normal.      Mouth/Throat:      Mouth: Mucous membranes are moist.      Pharynx: No oropharyngeal exudate.   Eyes:      General: No scleral icterus.     Extraocular Movements: Extraocular movements intact.      Conjunctiva/sclera: Conjunctivae normal.      Pupils: Pupils are equal, round, and reactive to light.   Cardiovascular:      Rate and Rhythm: Normal rate and regular rhythm.      Heart sounds: Normal heart sounds. No murmur heard.    No gallop.   Pulmonary:      Effort: Pulmonary effort is normal. No respiratory distress.      Breath sounds: Normal breath sounds. No stridor. No wheezing, rhonchi or rales.   Chest:      Chest wall: No tenderness.   Abdominal:      General: Bowel sounds are normal. There is no distension.      Palpations: Abdomen is soft. There is no mass.      Tenderness: There is no abdominal tenderness. There is no guarding or rebound.   Musculoskeletal:         General: No swelling. Normal range of motion.      Cervical back: Normal range of motion and neck supple.      Right lower leg: No edema.      Left lower leg: No edema.   Lymphadenopathy:      Cervical: No cervical adenopathy.   Skin:     General: Skin is warm and dry.      Coloration: Skin is not jaundiced.      Findings: No rash.    Neurological:      General: No focal deficit present.      Mental Status: He is alert and oriented to person, place, and time.      Cranial Nerves: No cranial nerve deficit.   Psychiatric:         Mood and Affect: Mood normal.         Behavior: Behavior normal.         Thought Content: Thought content normal.         Judgment: Judgment normal.       Laboratory:  Recent Labs     03/12/23  1000   WBC 4.1*   RBC 4.92   HEMOGLOBIN 14.9   HEMATOCRIT 43.9   MCV 89.2   MCH 30.3   MCHC 33.9   RDW 39.7   PLATELETCT 169   MPV 10.6     Recent Labs     03/12/23  1000   SODIUM 137   POTASSIUM 4.0   CHLORIDE 104   CO2 22   GLUCOSE 101*   BUN 11   CREATININE 0.79   CALCIUM 9.3     Recent Labs     03/12/23  1000   ALTSGPT 30   ASTSGOT 12   ALKPHOSPHAT 68   TBILIRUBIN 1.1   GLUCOSE 101*         No results for input(s): NTPROBNP in the last 72 hours.      Recent Labs     03/12/23  1000   TROPONINT 6       Imaging:  DX-CHEST-PORTABLE (1 VIEW)   Final Result      No acute cardiac or pulmonary abnormalities are identified.      EC-ECHOCARDIOGRAM COMPLETE W/O CONT    (Results Pending)       X-ray and EKG reviewed as above.    Assessment/Plan:  Justification for Admission Status  I anticipate this patient is appropriate for observation status at this time because palpitation, cardiac work-up, medication titration      * Palpitations- (present on admission)  Assessment & Plan  - Unclear etiology, started after he recovered from COVID-19 about 2 weeks ago.  EKG showed no arrhythmias or AV blocks.  Patient states that he usually has baseline low heart rate in the 50s.  He did have history of atrial fibrillation in his 30s attributed to synthetic steroids for bodybuilding.  -Will monitor on telemetry to evaluate for arrhythmias.   -Will obtain an updated echocardiogram.  -Check TSH and cortisol levels.  Send for urine drug screen.  -Start Coreg 6.25 mg twice daily per cardiology recommendations, which should also help with better blood  pressure control.  -Await further cardiology inputs.    Coronary artery calcification of native artery- (present on admission)  Assessment & Plan  - He was found to have a coronary calcium score of 217.4, following a false positive exercise stress test last year.  -Resume aspirin, and Lipitor.  Check lipid profile.  -He will also be on Coreg, and Cozaar.  -Monitor on telemetry.    HTN (hypertension)- (present on admission)  Assessment & Plan  - Appears to be uncontrolled.  Initial blood pressure 190/92.  -Needs to optimize blood pressure control.  Start Coreg 12.5 mg twice daily for his palpitations, and resume Cozaar 25 mg daily.  Monitor blood pressure trend closely with as needed IV labetalol for significant hypertension.        VTE prophylaxis: SCDs/TEDs

## 2023-03-12 NOTE — PROGRESS NOTES
2 RN Skin Assessment Completed by , RN and , RN.     Head: WDL  Ears: WDL  Nose: WDL  Mouth: WDL  Neck: WDL  Breasts/Chest: WDL  Shoulder Blades: WDL  Spine: WDL  (R) Arm/Elbow/hand: WDL  (L) Arm/Elbow/hand: WDL  Abdomen:WDL  Groin: WDL  Sacrum/Coccyx/Buttocks: wdl  (R) Leg: WDL  (L) Leg: WDL  (R) Heel/Foot/Toe: wdl  (L) Heel/Foot/Toe: wdl              D

## 2023-03-12 NOTE — ASSESSMENT & PLAN NOTE
- Unclear etiology, started after he recovered from COVID-19 about 2 weeks ago.  EKG showed no arrhythmias or AV blocks.  Patient states that he usually has baseline low heart rate in the 50s.  He did have history of atrial fibrillation in his 30s attributed to synthetic steroids for bodybuilding.  -Will monitor on telemetry to evaluate for arrhythmias.   -Will obtain an updated echocardiogram.  -Check TSH and cortisol levels.  Send for urine drug screen.  -Start Coreg 6.25 mg twice daily per cardiology recommendations, which should also help with better blood pressure control.  -Await further cardiology inputs.

## 2023-03-12 NOTE — PROGRESS NOTES
Pt brought from ED in wheelchair,on arrival pt awake,a&ox4,v/s wnl,mild fever,will recheck,pt not in distress,poc explained,tele with SR with rate of 63/mt.

## 2023-03-12 NOTE — CARE PLAN
The patient is Watcher - Medium risk of patient condition declining or worsening    Shift Goals  Clinical Goals: echo/monitor HR  Patient Goals: Back to normal    Progress made toward(s) clinical / shift goals:  regular    Patient is not progressing towards the following goals:      Problem: Knowledge Deficit - Standard  Goal: Patient and family/care givers will demonstrate understanding of plan of care, disease process/condition, diagnostic tests and medications  Outcome: Progressing

## 2023-03-12 NOTE — CONSULTS
Reason for Consult:  Asked by Dr Spencer Mclain M.D. to see this patient with palpitations    CC:   Chief Complaint   Patient presents with    Palpitations     Pt had an episode of rapid HR, accompanied with SOB.         HPI:     47 year old man with PMH paroxysmal AF, HTN, nonobstructive CAD presents with palpitations. States he is very active and works out daily. He runs without cardiovascular limitations. Compliant with medications and denies adverse effects. Recalls his blood pressure used to be normal but since covid has been very high. Denies toxic social habits.    Medications / Drug list prior to admission:  No current facility-administered medications on file prior to encounter.     Current Outpatient Medications on File Prior to Encounter   Medication Sig Dispense Refill    aspirin EC (ECOTRIN) 81 MG Tablet Delayed Response Take 1 Tablet by mouth every day. 90 Tablet 3    atorvastatin (LIPITOR) 40 MG Tab Take 1 Tablet by mouth every evening. 90 Tablet 3    losartan (COZAAR) 25 MG Tab Take 1 Tablet by mouth every day. 90 Tablet 3    ibuprofen (MOTRIN) 400 MG Tab Take 1.5 Tablets by mouth every 6 hours as needed for Mild Pain. 30 Tablet     Vitamin D, Cholecalciferol, 25 MCG (1000 UT) Cap Take 2,000 Units by mouth every day. 60 Capsule 0       Current list of administered Medications:    Current Facility-Administered Medications:     carvedilol (COREG) tablet 12.5 mg, 12.5 mg, Oral, BID WITH MEALS, Spencer Mclain M.D.    [START ON 3/13/2023] losartan (COZAAR) tablet 25 mg, 25 mg, Oral, Q DAY, Spencer Mclain M.D.    aspirin EC (ECOTRIN) tablet 81 mg, 81 mg, Oral, DAILY, Spencer Mclain M.D.    atorvastatin (LIPITOR) tablet 40 mg, 40 mg, Oral, Nightly, Spencer Mclain M.D.    Vitamin D (Cholecalciferol) CAPS 2,000 Units, 2,000 Units, Oral, DAILY, Spencer Mclain M.D.    Respiratory Therapy Consult, , Nebulization, Continuous RT, Spencer Mclain M.D.    acetaminophen (Tylenol) tablet 650 mg, 650 mg, Oral,  Q6HRS PRN, Spencer Mclain M.D.    ondansetron (ZOFRAN) syringe/vial injection 4 mg, 4 mg, Intravenous, Q4HRS PRN, Spencer Mclain M.D.    ondansetron (ZOFRAN ODT) dispertab 4 mg, 4 mg, Oral, Q4HRS PRN, Spencer Mclain M.D.    promethazine (PHENERGAN) tablet 12.5-25 mg, 12.5-25 mg, Oral, Q4HRS PRN, Spencer Mclain M.D.    promethazine (PHENERGAN) suppository 12.5-25 mg, 12.5-25 mg, Rectal, Q4HRS PRN, Spencer Mclain M.D.    prochlorperazine (COMPAZINE) injection 5-10 mg, 5-10 mg, Intravenous, Q4HRS PRN, Spencer Mclain M.D.    senna-docusate (PERICOLACE or SENOKOT S) 8.6-50 MG per tablet 2 Tablet, 2 Tablet, Oral, BID **AND** polyethylene glycol/lytes (MIRALAX) PACKET 1 Packet, 1 Packet, Oral, QDAY PRN **AND** magnesium hydroxide (MILK OF MAGNESIA) suspension 30 mL, 30 mL, Oral, QDAY PRN **AND** bisacodyl (DULCOLAX) suppository 10 mg, 10 mg, Rectal, QDAY PRN, Spencer Mclain M.D.    labetalol (NORMODYNE/TRANDATE) injection 10 mg, 10 mg, Intravenous, Q4HRS PRN, Spencer Mclain M.D.    Current Outpatient Medications:     aspirin EC (ECOTRIN) 81 MG Tablet Delayed Response, Take 1 Tablet by mouth every day., Disp: 90 Tablet, Rfl: 3    atorvastatin (LIPITOR) 40 MG Tab, Take 1 Tablet by mouth every evening., Disp: 90 Tablet, Rfl: 3    losartan (COZAAR) 25 MG Tab, Take 1 Tablet by mouth every day., Disp: 90 Tablet, Rfl: 3    ibuprofen (MOTRIN) 400 MG Tab, Take 1.5 Tablets by mouth every 6 hours as needed for Mild Pain., Disp: 30 Tablet, Rfl:     Vitamin D, Cholecalciferol, 25 MCG (1000 UT) Cap, Take 2,000 Units by mouth every day., Disp: 60 Capsule, Rfl: 0    Past Medical History:   Diagnosis Date    A-fib (HCC)     4 years ago, one episode only, echo/stress test normal    Geographic tongue     Hypertension        History reviewed. No pertinent surgical history.    Family History   Problem Relation Age of Onset    Diabetes Mother     Lung Disease Son      Patient family history was personally reviewed, no pertinent family  history to current presentation    Social History     Socioeconomic History    Marital status:      Spouse name: Not on file    Number of children: Not on file    Years of education: Not on file    Highest education level: Not on file   Occupational History    Not on file   Tobacco Use    Smoking status: Former     Packs/day: 0.25     Years: 1.00     Pack years: 0.25     Types: Cigarettes    Smokeless tobacco: Never   Vaping Use    Vaping Use: Never used   Substance and Sexual Activity    Alcohol use: Yes     Alcohol/week: 1.8 oz     Types: 1 Glasses of wine, 1 Cans of beer, 1 Shots of liquor per week     Comment: Occ    Drug use: Not Currently     Types: Marijuana    Sexual activity: Yes     Partners: Female     Comment: wife, Fern   Other Topics Concern    Not on file   Social History Narrative    Not on file     Social Determinants of Health     Financial Resource Strain: Not on file   Food Insecurity: Not on file   Transportation Needs: Not on file   Physical Activity: Not on file   Stress: Not on file   Social Connections: Not on file   Intimate Partner Violence: Not on file   Housing Stability: Not on file       ALLERGIES:  Allergies   Allergen Reactions    Diltiazem Hcl      Other reaction(s): Headache    Lisinopril      cough    Other Drug      Can't remember.  Few yrs ago. Anti-anxiety pill       Review of systems:  A complete review of symptoms was reviewed with patient. This is reviewed in H&P and PMH. ALL OTHERS reviewed and negative    Physical exam:  Patient Vitals for the past 24 hrs:   BP Temp Pulse Resp SpO2 Weight   03/12/23 1203 (!) 156/84 -- 61 20 97 % --   03/12/23 1103 (!) 152/75 -- (!) 58 18 98 % --   03/12/23 1018 (!) 190/92 -- 77 16 99 % --   03/12/23 0946 -- -- -- -- -- 95.6 kg (210 lb 12.2 oz)   03/12/23 0935 (!) 176/90 36.7 °C (98 °F) 65 18 99 % --     General: No acute distress.   EYES: no jaundice  HEENT: OP clear   Neck: No bruits No JVD.   CVS:  RRR. S1 + S2. No M/R/G. No  edema.  Resp: CTAB. No wheezing or crackles/rhonchi.  Abdomen: Soft, NT, ND,  Skin: Grossly nothing acute no obvious rashes  Neurological: Alert, Moves all extremities, no cranial nerve defects on limited exam  Extremities: Pulse 2+ in b/l LE. No cyanosis.     Data:  Laboratory studies personally reviewed by me:  Recent Results (from the past 24 hour(s))   EKG    Collection Time: 23  9:42 AM   Result Value Ref Range    Report       [facility] Emergency Dept.    Test Date:  2023  Pt Name:    MERCEDEZ BRIDGES            Department: ER  MRN:        [id number]                      Room:  Gender:     Male                         Technician: [id number]  :        [id number]                   Requested By:ER TRIAGE PROTOCOL  Order #:    [id number]                    Reading MD:    Measurements  Intervals                                Axis  Rate:       68                           P:          82  OR:         137                          QRS:        46  QRSD:       91                           T:          -7  QT:         402  QTc:        428    Interpretive Statements  Sinus rhythm  Probable left ventricular hypertrophy  Compared to ECG 2023 08:27:43  No significant changes     CBC with Differential    Collection Time: 23 10:00 AM   Result Value Ref Range    WBC 4.1 (L) 4.8 - 10.8 K/uL    RBC 4.92 4.70 - 6.10 M/uL    Hemoglobin 14.9 14.0 - 18.0 g/dL    Hematocrit 43.9 42.0 - 52.0 %    MCV 89.2 81.4 - 97.8 fL    MCH 30.3 27.0 - 33.0 pg    MCHC 33.9 33.7 - 35.3 g/dL    RDW 39.7 35.9 - 50.0 fL    Platelet Count 169 164 - 446 K/uL    MPV 10.6 9.0 - 12.9 fL    Neutrophils-Polys 60.50 44.00 - 72.00 %    Lymphocytes 29.00 22.00 - 41.00 %    Monocytes 7.10 0.00 - 13.40 %    Eosinophils 2.70 0.00 - 6.90 %    Basophils 0.50 0.00 - 1.80 %    Immature Granulocytes 0.20 0.00 - 0.90 %    Nucleated RBC 0.00 /100 WBC    Neutrophils (Absolute) 2.46 1.82 - 7.42 K/uL    Lymphs (Absolute) 1.18 1.00 - 4.80  K/uL    Monos (Absolute) 0.29 0.00 - 0.85 K/uL    Eos (Absolute) 0.11 0.00 - 0.51 K/uL    Baso (Absolute) 0.02 0.00 - 0.12 K/uL    Immature Granulocytes (abs) 0.01 0.00 - 0.11 K/uL    NRBC (Absolute) 0.00 K/uL   Complete Metabolic Panel (CMP)    Collection Time: 03/12/23 10:00 AM   Result Value Ref Range    Sodium 137 135 - 145 mmol/L    Potassium 4.0 3.6 - 5.5 mmol/L    Chloride 104 96 - 112 mmol/L    Co2 22 20 - 33 mmol/L    Anion Gap 11.0 7.0 - 16.0    Glucose 101 (H) 65 - 99 mg/dL    Bun 11 8 - 22 mg/dL    Creatinine 0.79 0.50 - 1.40 mg/dL    Calcium 9.3 8.5 - 10.5 mg/dL    AST(SGOT) 12 12 - 45 U/L    ALT(SGPT) 30 2 - 50 U/L    Alkaline Phosphatase 68 30 - 99 U/L    Total Bilirubin 1.1 0.1 - 1.5 mg/dL    Albumin 4.2 3.2 - 4.9 g/dL    Total Protein 6.6 6.0 - 8.2 g/dL    Globulin 2.4 1.9 - 3.5 g/dL    A-G Ratio 1.8 g/dL   Troponins NOW    Collection Time: 03/12/23 10:00 AM   Result Value Ref Range    Troponin T 6 6 - 19 ng/L   D-DIMER    Collection Time: 03/12/23 10:00 AM   Result Value Ref Range    D-Dimer <0.27 0.00 - 0.50 ug/mL (FEU)   CORRECTED CALCIUM    Collection Time: 03/12/23 10:00 AM   Result Value Ref Range    Correct Calcium 9.1 8.5 - 10.5 mg/dL   ESTIMATED GFR    Collection Time: 03/12/23 10:00 AM   Result Value Ref Range    GFR (CKD-EPI) 110 >60 mL/min/1.73 m 2       EKG 3/12/23 sinus 68, LVH    All pertinent features of laboratory and imaging reviewed including primary images where applicable    TTE 3/30/22  CONCLUSIONS  Normal left ventricular size, wall thickness and systolic function.  Left ventricle ejection fraction estimated to be 60%.  No hemodynamically significant valvular heart disease noted.  Agitated saline (bubble) study was performed with and without Valsalva   maneuver. No evidence of right to left shunt by agitated saline study.    CCTA 1/4/23  Coronary calcification:  LMA - 0.0  LCX - 131.6  LAD - 44.3  RCA - 41.5  Total Calcium Score: 217.4  1.  Coronary calcium score of 217.4.  2.   Triple vessel coronary artery disease, with mixed plaque in the mid LCx resulting in approximately 50% stenosis.  3.  Mild, predominantly calcific plaque in the LAD and RCA results in no hemodynamically significant stenosis.    DOS: 4/6/2022   Event Monitor Summary:  Time Monitored 14 days     1. Sinus rhythm with heart rate range from 36 to 142 bpm. Average heart rate 61 bpm.   2. Normal sinus node and AV node conduction normal. No pauses.   3. Rare PACs.   4. Rare PVCs.   5. No sustained rhythm changes. No atrial fibrillation/flutter.   6. Patient triggers were associated with sinus rhythm with heart rate range of 59-95 bpm.     Conclusion: Normal event monitor results     Principal Problem:    Palpitations POA: Yes  Active Problems:    HTN (hypertension) POA: Yes    Coronary artery calcification of native artery POA: Yes  Resolved Problems:    * No resolved hospital problems. *      Assessment / Plan:  47 year old man with PMH paroxysmal AF, HTN, nonobstructive CAD presents with palpitations.     -BP control. Titrate losartan and add carvedilol  -paroxysmal AF potential cause of palpitations. Consider event monitor on discharge. No recurrence on last 4/6/22. Add carvedilol. Chadsvasc 1 aspirin for cva prevention.  -nonobstructive CAD continue aspirin and statin  -will sign off. Please call with further cardiology questions.     I personally discussed his case with Dr Mclain    It is my pleasure to participate in the care of Mr. Donald.  Please do not hesitate to contact me with questions or concerns.    Davidson Tristan MD  Cardiologist [facility]    A total of 65 minutes of time was spent on day of encounter reviewing medical record, performing history and examination, counseling, ordering medication/test/consults and documentation.

## 2023-03-13 VITALS
TEMPERATURE: 97.4 F | OXYGEN SATURATION: 99 % | HEIGHT: 72 IN | RESPIRATION RATE: 17 BRPM | SYSTOLIC BLOOD PRESSURE: 141 MMHG | WEIGHT: 208.78 LBS | BODY MASS INDEX: 28.28 KG/M2 | HEART RATE: 71 BPM | DIASTOLIC BLOOD PRESSURE: 76 MMHG

## 2023-03-13 LAB
ANION GAP SERPL CALC-SCNC: 9 MMOL/L (ref 7–16)
BASOPHILS # BLD AUTO: 0.5 % (ref 0–1.8)
BASOPHILS # BLD: 0.03 K/UL (ref 0–0.12)
BUN SERPL-MCNC: 14 MG/DL (ref 8–22)
CALCIUM SERPL-MCNC: 8.9 MG/DL (ref 8.5–10.5)
CHLORIDE SERPL-SCNC: 105 MMOL/L (ref 96–112)
CO2 SERPL-SCNC: 24 MMOL/L (ref 20–33)
CREAT SERPL-MCNC: 0.92 MG/DL (ref 0.5–1.4)
EOSINOPHIL # BLD AUTO: 0.14 K/UL (ref 0–0.51)
EOSINOPHIL NFR BLD: 2.3 % (ref 0–6.9)
ERYTHROCYTE [DISTWIDTH] IN BLOOD BY AUTOMATED COUNT: 40.3 FL (ref 35.9–50)
GFR SERPLBLD CREATININE-BSD FMLA CKD-EPI: 103 ML/MIN/1.73 M 2
GLUCOSE SERPL-MCNC: 98 MG/DL (ref 65–99)
HCT VFR BLD AUTO: 40.9 % (ref 42–52)
HGB BLD-MCNC: 14 G/DL (ref 14–18)
IMM GRANULOCYTES # BLD AUTO: 0.02 K/UL (ref 0–0.11)
IMM GRANULOCYTES NFR BLD AUTO: 0.3 % (ref 0–0.9)
LYMPHOCYTES # BLD AUTO: 1.79 K/UL (ref 1–4.8)
LYMPHOCYTES NFR BLD: 29.5 % (ref 22–41)
MCH RBC QN AUTO: 30.3 PG (ref 27–33)
MCHC RBC AUTO-ENTMCNC: 34.2 G/DL (ref 33.7–35.3)
MCV RBC AUTO: 88.5 FL (ref 81.4–97.8)
MONOCYTES # BLD AUTO: 0.43 K/UL (ref 0–0.85)
MONOCYTES NFR BLD AUTO: 7.1 % (ref 0–13.4)
NEUTROPHILS # BLD AUTO: 3.65 K/UL (ref 1.82–7.42)
NEUTROPHILS NFR BLD: 60.3 % (ref 44–72)
NRBC # BLD AUTO: 0 K/UL
NRBC BLD-RTO: 0 /100 WBC
PLATELET # BLD AUTO: 197 K/UL (ref 164–446)
PMV BLD AUTO: 10.4 FL (ref 9–12.9)
POTASSIUM SERPL-SCNC: 4.3 MMOL/L (ref 3.6–5.5)
RBC # BLD AUTO: 4.62 M/UL (ref 4.7–6.1)
SODIUM SERPL-SCNC: 138 MMOL/L (ref 135–145)
WBC # BLD AUTO: 6.1 K/UL (ref 4.8–10.8)

## 2023-03-13 PROCEDURE — G0378 HOSPITAL OBSERVATION PER HR: HCPCS

## 2023-03-13 PROCEDURE — 85025 COMPLETE CBC W/AUTO DIFF WBC: CPT

## 2023-03-13 PROCEDURE — 700102 HCHG RX REV CODE 250 W/ 637 OVERRIDE(OP): Performed by: INTERNAL MEDICINE

## 2023-03-13 PROCEDURE — 99239 HOSP IP/OBS DSCHRG MGMT >30: CPT | Performed by: INTERNAL MEDICINE

## 2023-03-13 PROCEDURE — 80048 BASIC METABOLIC PNL TOTAL CA: CPT

## 2023-03-13 PROCEDURE — A9270 NON-COVERED ITEM OR SERVICE: HCPCS | Performed by: INTERNAL MEDICINE

## 2023-03-13 RX ORDER — LOSARTAN POTASSIUM 50 MG/1
50 TABLET ORAL DAILY
Qty: 30 TABLET | Refills: 0 | Status: SHIPPED | OUTPATIENT
Start: 2023-03-13 | End: 2023-05-16 | Stop reason: SDUPTHER

## 2023-03-13 RX ORDER — CARVEDILOL 3.12 MG/1
TABLET ORAL
Qty: 60 TABLET | Refills: 0 | Status: SHIPPED | OUTPATIENT
Start: 2023-03-13 | End: 2023-10-17

## 2023-03-13 RX ORDER — CARVEDILOL 3.12 MG/1
3.12 TABLET ORAL 2 TIMES DAILY WITH MEALS
Status: DISCONTINUED | OUTPATIENT
Start: 2023-03-13 | End: 2023-03-13 | Stop reason: HOSPADM

## 2023-03-13 RX ADMIN — LOSARTAN POTASSIUM 25 MG: 50 TABLET, FILM COATED ORAL at 05:12

## 2023-03-13 RX ADMIN — ASPIRIN 81 MG: 81 TABLET, COATED ORAL at 05:13

## 2023-03-13 RX ADMIN — Medication 2000 UNITS: at 05:13

## 2023-03-13 NOTE — CARE PLAN
Problem: Knowledge Deficit - Standard  Goal: Patient and family/care givers will demonstrate understanding of plan of care, disease process/condition, diagnostic tests and medications  Outcome: Progressing   The patient is Stable - Low risk of patient condition declining or worsening    Shift Goals  Clinical Goals: monitor HR and BP  Patient Goals: Get answers  Family Goals: get him answers    Progress made toward(s) clinical / shift goals:  Pt's voiced understanding of his situation and diagnosis for being in hospitalized.     Patient is not progressing towards the following goals: N/A

## 2023-03-13 NOTE — PROGRESS NOTES
Assumed care from Bhumika RN.  Assessment complete. Plan of care gone over with patient and all concerns. Patient was resting in bed comfortably with wife at bedside. Patient was A&O x 4. Safety precautions in place. Patient had no concerns at this time and educated on how to use the call light.

## 2023-03-13 NOTE — DISCHARGE SUMMARY
Discharge Summary    CHIEF COMPLAINT ON ADMISSION  Chief Complaint   Patient presents with    Palpitations     Pt had an episode of rapid HR, accompanied with SOB.         Reason for Admission  Heart Palpitations      Admission Date  3/12/2023    CODE STATUS  Full Code    HPI & HOSPITAL COURSE  Jensen Donald is a 47 y.o. male with history of hypertension, remote history of atrial fibrillation in his 30s which was attributed to dietary supplement and systemic steroids for bodybuilding, otherwise in a good state of health, exercises regularly, until he had COVID-19 infection approximately about 2-2 and half weeks ago with minimal symptoms.  Shortly after recovering from COVID-19, he started to noted spikes in his heart rate as indicated by his smart watch, with heart rate spiking to the 130s, which not associated with exertion or any activity.  He remained asymptomatic from it.  However in the morning of admission, he was just walking down strenuously when he started to feel palpitations, with associated lightheadedness, and shortness of breath.  He denied any chest pain.  He had no other complaints.  He then decided to go to the ED.     Notably, he followed with outpatient cardiology last year for a false positive stress test, prompting a coronary calcium CT which gave a score of 217 and for which he was started on aspirin and statin.  For some time, he was not taking any antihypertensives as his hypertension was diet and lifestyle controlled, however on cardiology follow-up he was restarted on losartan due to his blood pressure trending up.  He was restarted on losartan as well as his blood pressure was running high.     On evaluation, he was noted to have elevated blood pressure of 190/92.  Heart rate was 77.  EKG showed normal sinus rhythm and LVH with no dynamic ischemic changes.  Chest x-ray showed nothing acute.  Labs are unremarkable.  CBC and BMP were normal.  Troponin was negative, and D-dimer was  low.  Cardiology was consulted who recommended starting him on Coreg in addition to losartan and titrating medications to normal blood pressure, along with cardiac monitoring.  Patient was subsequently admitted to the CDU.    In the CDU, he was monitored on telemetry without any detected arrhythmias or tachycardia.  His blood pressure trend improved.  He had an echocardiogram done which was normal with EF of 60%, RVSP of 25 mmHg, and no change from echocardiogram done in March 2022.  TSH was normal.  Urine drug screen was normal.  Troponin was negative x2.  D-dimer was negative.  Cortisol is normal.    He remained hemodynamically stable and afebrile. I have personally seen and examined the patient on the day of discharge. With his clinical improvement, he was deemed ready to discharge from the hospital as he did not have any further hospitalization needs. Patient felt comfortable going home. The discharge plan was discussed with the patient, with which he was agreeable to.     Therefore, he is discharged in good and stable condition to home with close outpatient follow-up.      Discharge Date  3/13/2023      FOLLOW UP ITEMS POST DISCHARGE  -Continue losartan 50 mg daily.  He will also be on Coreg 3.125 mg daily in the morning, but he is counseled that he can take another dose at night for tachycardia.  -Follow-up with outpatient cardiology, may need to consider putting another Zio patch for extended monitoring.  -Suspect anxiety causing much of his symptoms.  Long COVID might be contributing.  Outpatient follow-up with PCP.  - counseled to seek immediate medical attention, or return to the ED for recurrent or worsening symptoms.      DISCHARGE DIAGNOSES  Principal Problem:    Palpitations POA: Yes  Active Problems:    Anxiety state POA: Yes    HTN (hypertension) POA: Yes    Coronary artery calcification of native artery POA: Yes  Resolved Problems:    * No resolved hospital problems. *      FOLLOW UP  Future  Appointments   Date Time Provider Department Center   4/3/2023 10:20 AM Cem Maldonado M.D. [facility]   4/17/2023  7:45 AM Jae Flowers M.D. [facility] None     No follow-up provider specified.    MEDICATIONS ON DISCHARGE     Medication List        START taking these medications        Instructions   carvedilol 3.125 MG Tabs  Commonly known as: COREG   Take 1 tab every morning.  May take another 1 tablet at night as needed at night for palpitations/fast heart rate >100.            CHANGE how you take these medications        Instructions   losartan 50 MG Tabs  What changed:   medication strength  how much to take  Commonly known as: COZAAR   Take 1 Tablet by mouth every day.  Dose: 50 mg            CONTINUE taking these medications        Instructions   aspirin EC 81 MG Tbec  Commonly known as: ECOTRIN   Take 1 Tablet by mouth every day.  Dose: 81 mg     atorvastatin 40 MG Tabs  Commonly known as: LIPITOR   Take 1 Tablet by mouth every evening.  Dose: 40 mg     ibuprofen 400 MG Tabs  Commonly known as: MOTRIN   Doctor's comments: For 5 days  Take 1.5 Tablets by mouth every 6 hours as needed for Mild Pain.  Dose: 600 mg     Vitamin D (Cholecalciferol) 25 MCG (1000 UT) Caps   Take 2,000 Units by mouth every day.  Dose: 2,000 Units              Allergies  Allergies   Allergen Reactions    Diltiazem Hcl      Other reaction(s): Headache    Lisinopril      cough    Other Drug      Can't remember.  Few yrs ago. Anti-anxiety pill       DIET  Orders Placed This Encounter   Procedures    Diet Order Diet: Regular     Standing Status:   Standing     Number of Occurrences:   1     Order Specific Question:   Diet:     Answer:   Regular [1]       ACTIVITY  As tolerated.  Weight bearing as tolerated    CONSULTATIONS  Cardiology    PROCEDURES  None    LABORATORY  Lab Results   Component Value Date    SODIUM 138 03/13/2023    POTASSIUM 4.3 03/13/2023    CHLORIDE 105 03/13/2023    CO2 24 03/13/2023    GLUCOSE 98 03/13/2023     BUN 14 03/13/2023    CREATININE 0.92 03/13/2023        Lab Results   Component Value Date    WBC 6.1 03/13/2023    HEMOGLOBIN 14.0 03/13/2023    HEMATOCRIT 40.9 (L) 03/13/2023    PLATELETCT 197 03/13/2023        Total time of the discharge process = 42 minutes.

## 2023-03-13 NOTE — PROGRESS NOTES
Assumed pt care from Allison SO. Assessment complete. NAD. Patient A&O x4. Discussed plan of care and concerns addressed. Pt ambulated to the bathroom. Pt education about Carvedilol being held for HR of 49. Patient had no concerns at this time.

## 2023-03-13 NOTE — DISCHARGE INSTRUCTIONS
Hypertension, Adult  Hypertension is another name for high blood pressure. High blood pressure forces your heart to work harder to pump blood. This can cause problems over time.  There are two numbers in a blood pressure reading. There is a top number (systolic) over a bottom number (diastolic). It is best to have a blood pressure that is below 120/80. Healthy choices can help lower your blood pressure, or you may need medicine to help lower it.  What are the causes?  The cause of this condition is not known. Some conditions may be related to high blood pressure.  What increases the risk?  Smoking.  Having type 2 diabetes mellitus, high cholesterol, or both.  Not getting enough exercise or physical activity.  Being overweight.  Having too much fat, sugar, calories, or salt (sodium) in your diet.  Drinking too much alcohol.  Having long-term (chronic) kidney disease.  Having a family history of high blood pressure.  Age. Risk increases with age.  Race. You may be at higher risk if you are .  Gender. Men are at higher risk than women before age 45. After age 65, women are at higher risk than men.  Having obstructive sleep apnea.  Stress.  What are the signs or symptoms?  High blood pressure may not cause symptoms. Very high blood pressure (hypertensive crisis) may cause:  Headache.  Feelings of worry or nervousness (anxiety).  Shortness of breath.  Nosebleed.  A feeling of being sick to your stomach (nausea).  Throwing up (vomiting).  Changes in how you see.  Very bad chest pain.  Seizures.  How is this treated?  This condition is treated by making healthy lifestyle changes, such as:  Eating healthy foods.  Exercising more.  Drinking less alcohol.  Your health care provider may prescribe medicine if lifestyle changes are not enough to get your blood pressure under control, and if:  Your top number is above 130.  Your bottom number is above 80.  Your personal target blood pressure may vary.  Follow  these instructions at home:  Eating and drinking    If told, follow the DASH eating plan. To follow this plan:  Fill one half of your plate at each meal with fruits and vegetables.  Fill one fourth of your plate at each meal with whole grains. Whole grains include whole-wheat pasta, brown rice, and whole-grain bread.  Eat or drink low-fat dairy products, such as skim milk or low-fat yogurt.  Fill one fourth of your plate at each meal with low-fat (lean) proteins. Low-fat proteins include fish, chicken without skin, eggs, beans, and tofu.  Avoid fatty meat, cured and processed meat, or chicken with skin.  Avoid pre-made or processed food.  Eat less than 1,500 mg of salt each day.  Do not drink alcohol if:  Your doctor tells you not to drink.  You are pregnant, may be pregnant, or are planning to become pregnant.  If you drink alcohol:  Limit how much you use to:  0-1 drink a day for women.  0-2 drinks a day for men.  Be aware of how much alcohol is in your drink. In the [place] one drink equals one 12 oz bottle of beer (355 mL), one 5 oz glass of wine (148 mL), or one 1½ oz glass of hard liquor (44 mL).  Lifestyle    Work with your doctor to stay at a healthy weight or to lose weight. Ask your doctor what the best weight is for you.  Get at least 30 minutes of exercise most days of the week. This may include walking, swimming, or biking.  Get at least 30 minutes of exercise that strengthens your muscles (resistance exercise) at least 3 days a week. This may include lifting weights or doing Pilates.  Do not use any products that contain nicotine or tobacco, such as cigarettes, e-cigarettes, and chewing tobacco. If you need help quitting, ask your doctor.  Check your blood pressure at home as told by your doctor.  Keep all follow-up visits as told by your doctor. This is important.  Medicines  Take over-the-counter and prescription medicines only as told by your doctor. Follow directions carefully.  Do not skip doses of  blood pressure medicine. The medicine does not work as well if you skip doses. Skipping doses also puts you at risk for problems.  Ask your doctor about side effects or reactions to medicines that you should watch for.  Contact a doctor if you:  Think you are having a reaction to the medicine you are taking.  Have headaches that keep coming back (recurring).  Feel dizzy.  Have swelling in your ankles.  Have trouble with your vision.  Get help right away if you:  Get a very bad headache.  Start to feel mixed up (confused).  Feel weak or numb.  Feel faint.  Have very bad pain in your:  Chest.  Belly (abdomen).  Throw up more than once.  Have trouble breathing.  Summary  Hypertension is another name for high blood pressure.  High blood pressure forces your heart to work harder to pump blood.  For most people, a normal blood pressure is less than 120/80.  Making healthy choices can help lower blood pressure. If your blood pressure does not get lower with healthy choices, you may need to take medicine.  This information is not intended to replace advice given to you by your health care provider. Make sure you discuss any questions you have with your health care provider.  Document Released: 06/05/2009 Document Revised: 08/28/2019 Document Reviewed: 08/28/2019  Elsevier Patient Education © 2020 [employer]

## 2023-03-13 NOTE — PROGRESS NOTES
Patient to be discharged today - patient aware and agreeable to plan. D/c instructions reviewed with patient - ?'s/concerns answered. 1 iv removed, no meds to beds, no home meds locked up.

## 2023-03-13 NOTE — DISCHARGE PLANNING
HTH/SCP chart review completed. Collaborated with Primary CM Yaima RAMIREZ PT already discharged from unit before being seen by AICHA.

## 2023-03-13 NOTE — CARE PLAN
Problem: Knowledge Deficit - Standard  Goal: Patient and family/care givers will demonstrate understanding of plan of care, disease process/condition, diagnostic tests and medications  Outcome: Progressing     The patient is Stable - Low risk of patient condition declining or worsening    Shift Goals  Clinical Goals: echo/monitor HR  Patient Goals: Back to normal    Progress made toward(s) clinical / shift goals:  Patient understands the importance of voicing his feelings and concerns. Patient understands the care plan and tests that have been set by his care team.     Patient is not progressing towards the following goals:

## 2023-03-16 ENCOUNTER — OFFICE VISIT (OUTPATIENT)
Dept: CARDIOLOGY | Facility: MEDICAL CENTER | Age: 48
End: 2023-03-16
Payer: COMMERCIAL

## 2023-03-16 ENCOUNTER — NON-PROVIDER VISIT (OUTPATIENT)
Dept: CARDIOLOGY | Facility: MEDICAL CENTER | Age: 48
End: 2023-03-16
Payer: COMMERCIAL

## 2023-03-16 VITALS
WEIGHT: 210 LBS | HEIGHT: 72 IN | BODY MASS INDEX: 28.44 KG/M2 | DIASTOLIC BLOOD PRESSURE: 80 MMHG | HEART RATE: 62 BPM | OXYGEN SATURATION: 99 % | SYSTOLIC BLOOD PRESSURE: 132 MMHG | RESPIRATION RATE: 18 BRPM

## 2023-03-16 DIAGNOSIS — I25.84 CORONARY ARTERY DISEASE DUE TO CALCIFIED CORONARY LESION: ICD-10-CM

## 2023-03-16 DIAGNOSIS — I49.3 PVC'S (PREMATURE VENTRICULAR CONTRACTIONS): ICD-10-CM

## 2023-03-16 DIAGNOSIS — I25.10 CORONARY ARTERY DISEASE DUE TO CALCIFIED CORONARY LESION: ICD-10-CM

## 2023-03-16 DIAGNOSIS — R93.1 HIGH CORONARY ARTERY CALCIUM SCORE: ICD-10-CM

## 2023-03-16 DIAGNOSIS — R07.89 CHEST WALL DISCOMFORT: ICD-10-CM

## 2023-03-16 DIAGNOSIS — R07.89 OTHER CHEST PAIN: ICD-10-CM

## 2023-03-16 DIAGNOSIS — I49.1 APC (ATRIAL PREMATURE CONTRACTIONS): ICD-10-CM

## 2023-03-16 DIAGNOSIS — I49.3 PVCS (PREMATURE VENTRICULAR CONTRACTIONS): ICD-10-CM

## 2023-03-16 DIAGNOSIS — Z86.79 ATRIAL FIBRILLATION, CURRENTLY IN SINUS RHYTHM: ICD-10-CM

## 2023-03-16 DIAGNOSIS — I10 ESSENTIAL HYPERTENSION: ICD-10-CM

## 2023-03-16 DIAGNOSIS — I48.0 PAROXYSMAL ATRIAL FIBRILLATION (HCC): ICD-10-CM

## 2023-03-16 DIAGNOSIS — F41.9 ANXIETY: ICD-10-CM

## 2023-03-16 PROCEDURE — 99215 OFFICE O/P EST HI 40 MIN: CPT | Performed by: PHYSICIAN ASSISTANT

## 2023-03-16 ASSESSMENT — FIBROSIS 4 INDEX: FIB4 SCORE: 0.52

## 2023-03-16 NOTE — PROGRESS NOTES
Chief Complaint   Patient presents with    Atrial Fibrillation     F/V Dx: Paroxysmal atrial fibrillation (HCC)       Subjective     Jensen Donald is a 47 y.o. male with a history of hypertension, CAD on CT, and a remote history of atrial fibrillation in his 30s who presents today for hospital follow up.     His primary cardiologist is Dr. Flowers. Last seen 3/6/2023 by Milvia Schulz. At that exam, he was seen after an episode of COVID. Since then, he had been experiencing reproducible chest pain and a fast heart rate. An EKG was performed which did not show any acute findings. He was hypertensive in the office after stopping his losartan.  His losartan was resumed and he was also started on aspirin and a statin. He was to follow up in 6 weeks. 3/12/2023 he presented to the ED with concerns of palpitations with associated lightheadedness and shortness of breath. He was found to be markedly hypertensive with a blood pressure of 190/92. He was started on Coreg in addition to his losartan with a return to normal. An echocardiogram was performed which demonstrated an LVEF of 60% and RVSP of 25mmHg unchanged from prior. He was discharged in stable condition.     Today, he reports continued palpitations. He still does have some chest pain after working out, but none since his last visit. No shortness of breath, dyspnea on exertion, orthopnea or PND. No lower extremity edema. No dizziness or lightheadedness. No syncope or presyncope.      He has recently started working out again.     He reports lots of anxiety surrounding his blood pressure and heart rate.     Past Medical History:   Diagnosis Date    A-fib (HCC)     4 years ago, one episode only, echo/stress test normal    Geographic tongue     Hypertension      History reviewed. No pertinent surgical history.  Family History   Problem Relation Age of Onset    Diabetes Mother     Lung Disease Son      Social History     Socioeconomic History    Marital status:       Spouse name: Not on file    Number of children: Not on file    Years of education: Not on file    Highest education level: Not on file   Occupational History    Not on file   Tobacco Use    Smoking status: Former     Packs/day: 0.25     Years: 1.00     Pack years: 0.25     Types: Cigarettes    Smokeless tobacco: Never   Vaping Use    Vaping Use: Never used   Substance and Sexual Activity    Alcohol use: Yes     Alcohol/week: 1.8 oz     Types: 1 Glasses of wine, 1 Cans of beer, 1 Shots of liquor per week     Comment: Occ    Drug use: Not Currently     Types: Marijuana    Sexual activity: Yes     Partners: Female     Comment: wife, Fern   Other Topics Concern    Not on file   Social History Narrative    Not on file     Social Determinants of Health     Financial Resource Strain: Not on file   Food Insecurity: Not on file   Transportation Needs: Not on file   Physical Activity: Not on file   Stress: Not on file   Social Connections: Not on file   Intimate Partner Violence: Not on file   Housing Stability: Not on file     Allergies   Allergen Reactions    Diltiazem Hcl      Other reaction(s): Headache    Lisinopril      cough    Other Drug      Can't remember.  Few yrs ago. Anti-anxiety pill     Outpatient Encounter Medications as of 3/16/2023   Medication Sig Dispense Refill    losartan (COZAAR) 50 MG Tab Take 1 Tablet by mouth every day. 30 Tablet 0    carvedilol (COREG) 3.125 MG Tab Take 1 tab every morning.  May take another 1 tablet at night as needed at night for palpitations/fast heart rate >100. 60 Tablet 0    aspirin EC (ECOTRIN) 81 MG Tablet Delayed Response Take 1 Tablet by mouth every day. 90 Tablet 3    atorvastatin (LIPITOR) 40 MG Tab Take 1 Tablet by mouth every evening. 90 Tablet 3    Vitamin D, Cholecalciferol, 25 MCG (1000 UT) Cap Take 2,000 Units by mouth every day. 60 Capsule 0    ibuprofen (MOTRIN) 400 MG Tab Take 1.5 Tablets by mouth every 6 hours as needed for Mild Pain. (Patient not  taking: Reported on 3/16/2023) 30 Tablet      No facility-administered encounter medications on file as of 3/16/2023.     Review of Systems   Constitutional: Negative.  Negative for chills, fever and malaise/fatigue.   HENT: Negative.     Eyes: Negative.  Negative for blurred vision and double vision.   Respiratory:  Negative for cough and shortness of breath.    Cardiovascular:  Positive for chest pain and palpitations. Negative for orthopnea, claudication, leg swelling and PND.   Gastrointestinal: Negative.  Negative for abdominal pain, nausea and vomiting.   Genitourinary: Negative.    Musculoskeletal: Negative.  Negative for myalgias.   Skin: Negative.  Negative for rash.   Neurological: Negative.  Negative for dizziness, loss of consciousness, weakness and headaches.   Endo/Heme/Allergies: Negative.  Does not bruise/bleed easily.   Psychiatric/Behavioral:  The patient is nervous/anxious.             Objective     /80 (BP Location: Left arm, Patient Position: Sitting, BP Cuff Size: Adult)   Pulse 62   Resp 18   Ht 1.829 m (6')   Wt 95.3 kg (210 lb)   SpO2 99%   BMI 28.48 kg/m²     Physical Exam  Vitals reviewed.   Constitutional:       General: He is not in acute distress.     Appearance: Normal appearance.   HENT:      Head: Normocephalic and atraumatic.      Right Ear: External ear normal.      Left Ear: External ear normal.   Eyes:      General: No scleral icterus.     Extraocular Movements: Extraocular movements intact.      Conjunctiva/sclera: Conjunctivae normal.      Pupils: Pupils are equal, round, and reactive to light.   Cardiovascular:      Rate and Rhythm: Normal rate and regular rhythm.      Pulses: Normal pulses.      Heart sounds: Normal heart sounds. No murmur heard.    No friction rub. No gallop.   Pulmonary:      Effort: Pulmonary effort is normal.      Breath sounds: Normal breath sounds.   Abdominal:      General: Bowel sounds are normal.      Palpations: Abdomen is soft.       Tenderness: There is no abdominal tenderness.   Musculoskeletal:         General: Normal range of motion.      Cervical back: Normal range of motion and neck supple.      Right lower leg: No edema.      Left lower leg: No edema.   Skin:     General: Skin is warm and dry.      Capillary Refill: Capillary refill takes less than 2 seconds.   Neurological:      General: No focal deficit present.      Mental Status: He is alert and oriented to person, place, and time.   Psychiatric:         Mood and Affect: Mood normal.         Behavior: Behavior normal.         Judgment: Judgment normal.     Lab Results   Component Value Date/Time    CHOLSTRLTOT 132 03/12/2023 12:29 PM    LDL 63 03/12/2023 12:29 PM    HDL 53 03/12/2023 12:29 PM    TRIGLYCERIDE 82 03/12/2023 12:29 PM       Lab Results   Component Value Date/Time    SODIUM 138 03/13/2023 02:16 AM    POTASSIUM 4.3 03/13/2023 02:16 AM    CHLORIDE 105 03/13/2023 02:16 AM    CO2 24 03/13/2023 02:16 AM    GLUCOSE 98 03/13/2023 02:16 AM    BUN 14 03/13/2023 02:16 AM    CREATININE 0.92 03/13/2023 02:16 AM     Lab Results   Component Value Date/Time    ALKPHOSPHAT 68 03/12/2023 10:00 AM    ASTSGOT 12 03/12/2023 10:00 AM    ALTSGPT 30 03/12/2023 10:00 AM    TBILIRUBIN 1.1 03/12/2023 10:00 AM      Cardiovascular imaging and procedures:     EKG 3/12/2023  Personally interpreted by me as sinus rhythm with LVH.    Echocardiogram 3/12/2023  CONCLUSIONS  Compared to the prior study on 3-30-22, no changes  The left ventricular ejection fraction is visually estimated to be 60%.  Normal regional wall motion.  Estimated right ventricular systolic pressure is 25 mmHg.    Echocardiogram 3/3/2022  CONCLUSIONS  Normal left ventricular size, wall thickness and systolic function.  Left ventricle ejection fraction estimated to be 60%.  No hemodynamically significant valvular heart disease noted.  Agitated saline (bubble) study was performed with and without Valsalva   maneuver. No evidence of  right to left shunt by agitated saline study.    Treadmill stress test 7/5/2022   CONCLUSIONS   Positive stress ECG for ischemia with horizontal ST depression in    anterolateral leads may be false positive.   Excellent exercise capacity at 14.9 METS.   Normal blood pressure response.    Cardiac event monitor 4/1/2022  1. Sinus rhythm with heart rate range from 36 to 142 bpm. Average heart rate 61 bpm.   2. Normal sinus node and AV node conduction normal. No pauses.   3. Rare PACs.   4. Rare PVCs.   5. No sustained rhythm changes. No atrial fibrillation/flutter.   6. Patient triggers were associated with sinus rhythm with heart rate range of 59-95 bpm.   Conclusion: Normal event monitor results    Cardiac CT-CTA 1/4/2023  Coronary CTA:  Dominance: Right dominant circulation.  Left Main: Large caliber vessel with a normal takeoff from the left coronary cusp that bifurcates to form a left anterior descending artery and a left circumflex artery. No plaque or stenosis.  LAD and Diagonals: Patent. Multifocal, predominantly calcific plaque in the proximal and mid LAD results in less than 50% stenosis. No ramus intermedius.  LCX and Obtuse Marginals: Patent. Soft plaque in the proximal LCx results in less than 50% stenosis. Mixed plaque in the mid LCx results in approximately 50% stenosis.  RCA, Posterior Descending and Posterolateral Branches: Patent. Minimal calcific plaque in the mid RCA results in no hemodynamically significant stenosis.  Cardiac Structure and Morphology:  Left atrium: Normal in size. No thrombus in the left atrial appendage.  Left ventricle: Normal in size. No stigmata of prior infarction. No abnormal filling defect.  Pericardium: Normal thickness. No pericardial effusion or calcification.  Cardiac valves: No thickening or calcifications in the aortic or mitral valves.  Aorta: No aneurysm of the visualized thoracic aorta.  3D angiographic/MIP images of the vasculature confirm the vascular findings as  described above.  Extracardiac findings:  Lungs: Clear.  Mediastinum: No lymphadenopathy.  Upper abdomen: Unremarkable.  Bones: Unremarkable.  IMPRESSION:  1.  Coronary calcium score of 217.4.  2.  Triple vessel coronary artery disease, with mixed plaque in the mid LCx resulting in approximately 50% stenosis.  3.  Mild, predominantly calcific plaque in the LAD and RCA results in no hemodynamically significant stenosis.        Assessment & Plan     1. Essential hypertension        2. High coronary artery calcium score        3. Coronary artery disease due to calcified coronary lesion        4. PVC's (premature ventricular contractions)  Cardiac Event Monitor      5. Chest wall discomfort  NM-CARDIAC TREADMILL ONLY-NO IMAGING      6. Other chest pain  NM-CARDIAC TREADMILL ONLY-NO IMAGING      7. Paroxysmal atrial fibrillation (HCC)  Cardiac Event Monitor      8. Anxiety            Medical Decision Making: Today's Assessment/Status/Plan:        Hypertension  - Borderline control in office today.  - Recently started on coreg with improvement in BP.  - Continue losartan 50mg daily and coreg 3.125mg daily    CAD on CT  - Recent concerns of chest pain after COVID-19 that have since resolved.  - Treadmill stress test 7/5/2022 thought to be a false positive.   - Cardiac CTA demonstrates triple vessel CAD.   - LVEF and normal wall motion on recent echo.  - Repeat stress testing.    Remote history of paroxysmal atrial fibrillation  - 1 episode in his 30s with no evidence of recurrence, but now with concerns of palpitations.   -Rate is well controlled.   -[id number] Score (HTN 1, Vascular disease 1):  2  - Continue aspirin daily. Consider anticoagulation if recurrence on cardiac event monitor.   -Continue carvedilol 3.125mg daily  - We will repeat cardiac event monitoring to assess for any recurrence.     Anxiety  -We spent a significant time surrounding patient's anxiousness and elevated heart rate and blood pressure.  He  reports that he will often have while thinking about these conditions and will begin experiencing anxiousness.  He states that things improved as he is able to breathe deeply.  -He is very concerned about his anxiety.  -Offered to place him referral to psychiatry, but he declined at this time.  Discussed reaching out to his PCP for anxiolytics.    Follow up as scheduled 4/17/2023 with Dr. Flowers.    Encouraged him to reach out via MyChart or telephone with any questions or concerns.    Thank you for allowing me to participate in the care of Jensen Donald .    Isatu Molina PA-C, Cardiology  [facility], [facility]  [address] [address]  Phone: [phone]  Fax: [phone]    PLEASE NOTE: This Note was created using voice recognition Software. I have made every reasonable attempt to correct obvious errors, but I expect that there are errors of grammar and possibly content that I did not discover before finalizing the note.     I personally spent a total of 45 minutes which includes face-to-face time and non-face-to-face time spent on preparing to see the patient, reviewing hospital notes and tests, obtaining history from the patient, performing a medically appropriate exam, counseling and educating the patient, ordering medications/tests/procedures/referrals as clinically indicated, and documenting information in the electronic medical record.

## 2023-03-16 NOTE — PROGRESS NOTES
Patient enrolled in the 14 day [employer] Holter monitoring program per Isatu Molina PA-C..   >In-Clinic hook-up, serial # [id number].   >Currently in transit to hyth.

## 2023-03-21 ENCOUNTER — TELEPHONE (OUTPATIENT)
Dept: CARDIOLOGY | Facility: MEDICAL CENTER | Age: 48
End: 2023-03-21
Payer: COMMERCIAL

## 2023-03-21 NOTE — TELEPHONE ENCOUNTER
"Called pt to further assess. He stated has been been feeling \"edgy, anxious, jittery\" throughout the day and noticed it's been happening frequently since last OV. He had FV on 03/16/23 and has been taking carvedilol in the morning and needing to take it at night as well to help with BP and elevated HR. He said the second dose of carvedilol helps calm him down. Currently taking meds as below:    Morning:  Carvedilol 3.125mg  Losartan 50mg    Evening:  Carvedilol 3.125mg    Last dose of carvedilol was this morning. His BP was 160/85 and later recheck after sitting down to feel calm, 125/72. No other readings available for today at this time. Pt confirmed he's hydrating and eating well, works out and no new recent stressors. He says for the most part, he will notice his BP go up during mid-day and he can also \"feel it in my ears.\"    Pt also stated his HR is usually in the 60's throughout the day however at night it is in the 40's. Pt stated prior when he was only on losartan, he did not notice any of above symptoms. He is inquiring if he can take an alternative medication to carvedilol/BB as to prevent current symptoms. No other changes to his meds since last OV.    Advised pt to continue hydration at this time and will reach out to AM for further recommendations. He verbalized understanding and was very appreciative.    To Isatu, please review and advise further recommendations. Thank you!  "

## 2023-03-21 NOTE — TELEPHONE ENCOUNTER
AM    Caller: Jensen Donald     Topic/issue: Pt bp reading today is 160/85. He states he is very irritable, anxious and jittery. He is unsure if it is the medication. He is not feeling well. He will sit down and then try to calm himself. He says his BP will go from high and then drop down today was 125/72. He is concerned and would like a call back to discuss.     Callback Number: [phone] (home)      Thank you  Grace RIOS

## 2023-03-23 ASSESSMENT — ENCOUNTER SYMPTOMS
VOMITING: 0
CONSTITUTIONAL NEGATIVE: 1
HEADACHES: 0
FEVER: 0
MYALGIAS: 0
BRUISES/BLEEDS EASILY: 0
BLURRED VISION: 0
SHORTNESS OF BREATH: 0
LOSS OF CONSCIOUSNESS: 0
ORTHOPNEA: 0
EYES NEGATIVE: 1
DIZZINESS: 0
MUSCULOSKELETAL NEGATIVE: 1
PALPITATIONS: 1
COUGH: 0
DOUBLE VISION: 0
CLAUDICATION: 0
NEUROLOGICAL NEGATIVE: 1
CHILLS: 0
ABDOMINAL PAIN: 0
PND: 0
GASTROINTESTINAL NEGATIVE: 1
NAUSEA: 0
NERVOUS/ANXIOUS: 1
WEAKNESS: 0

## 2023-03-27 ENCOUNTER — TELEPHONE (OUTPATIENT)
Dept: CARDIOLOGY | Facility: MEDICAL CENTER | Age: 48
End: 2023-03-27
Payer: COMMERCIAL

## 2023-03-27 NOTE — TELEPHONE ENCOUNTER
AMY    Caller:Valencia Styles    Topic/issue: Jensen is having a stress test on Thursday, he has some questions about being off of his Beta-Blocker  (which is 48 hrs)    Callback Number: [phone]    Thank you,   Maribell NIETO

## 2023-03-29 NOTE — TELEPHONE ENCOUNTER
Returned patient's call, patient is concerned about not taking beta-blocker for stress test because he is worried about his heart. Patient states he is doing well with regards to heart rate at this time. Patient had multiple questions about Covid, current medications, stress test. All questions and concerns answered. Patient appreciative of call.

## 2023-03-30 ENCOUNTER — HOSPITAL ENCOUNTER (OUTPATIENT)
Dept: RADIOLOGY | Facility: MEDICAL CENTER | Age: 48
End: 2023-03-30
Attending: PHYSICIAN ASSISTANT
Payer: COMMERCIAL

## 2023-03-30 DIAGNOSIS — R07.89 CHEST WALL DISCOMFORT: ICD-10-CM

## 2023-03-30 DIAGNOSIS — R07.89 OTHER CHEST PAIN: ICD-10-CM

## 2023-03-30 PROCEDURE — 93017 CV STRESS TEST TRACING ONLY: CPT

## 2023-03-30 PROCEDURE — 93018 CV STRESS TEST I&R ONLY: CPT | Performed by: INTERNAL MEDICINE

## 2023-04-05 ENCOUNTER — TELEPHONE (OUTPATIENT)
Dept: CARDIOLOGY | Facility: MEDICAL CENTER | Age: 48
End: 2023-04-05
Payer: COMMERCIAL

## 2023-04-06 PROCEDURE — 93248 EXT ECG>7D<15D REV&INTERPJ: CPT | Performed by: INTERNAL MEDICINE

## 2023-04-06 PROCEDURE — 93246 EXT ECG>7D<15D RECORDING: CPT | Performed by: INTERNAL MEDICINE

## 2023-04-17 ENCOUNTER — OFFICE VISIT (OUTPATIENT)
Dept: CARDIOLOGY | Facility: MEDICAL CENTER | Age: 48
End: 2023-04-17
Payer: COMMERCIAL

## 2023-04-17 VITALS
WEIGHT: 203 LBS | SYSTOLIC BLOOD PRESSURE: 130 MMHG | OXYGEN SATURATION: 95 % | DIASTOLIC BLOOD PRESSURE: 80 MMHG | HEART RATE: 50 BPM | RESPIRATION RATE: 16 BRPM | BODY MASS INDEX: 27.5 KG/M2 | HEIGHT: 72 IN

## 2023-04-17 DIAGNOSIS — I25.10 CORONARY ARTERY DISEASE INVOLVING NATIVE CORONARY ARTERY OF NATIVE HEART WITHOUT ANGINA PECTORIS: ICD-10-CM

## 2023-04-17 DIAGNOSIS — E78.5 DYSLIPIDEMIA: ICD-10-CM

## 2023-04-17 DIAGNOSIS — R00.2 PALPITATIONS: ICD-10-CM

## 2023-04-17 DIAGNOSIS — I10 ESSENTIAL HYPERTENSION: ICD-10-CM

## 2023-04-17 PROCEDURE — 99214 OFFICE O/P EST MOD 30 MIN: CPT | Performed by: INTERNAL MEDICINE

## 2023-04-17 RX ORDER — ATORVASTATIN CALCIUM 80 MG/1
80 TABLET, FILM COATED ORAL NIGHTLY
Qty: 100 TABLET | Refills: 3 | Status: SHIPPED | OUTPATIENT
Start: 2023-04-17 | End: 2023-10-17

## 2023-04-17 ASSESSMENT — FIBROSIS 4 INDEX: FIB4 SCORE: 0.52

## 2023-04-17 NOTE — PROGRESS NOTES
Cardiology Follow-up Consultation Note    Date of note:    4/17/2023    Primary Care Provider: Rosalee Vasquez M.D.    Name:             Jensen Donald   YOB: 1975  MRN:               [id number]    Chief Complaint   Patient presents with    Atrial Fibrillation     FV DX: Paroxysmal atrial fibrillation (HCC)    Hypertension     FV DX: Essential hypertension    Other     FV DX: High coronary artery calcium score       HISTORY OF PRESENT ILLNESS  Mr. Jensen Donald is a 47 y.o. male who returns to see us for follow-up     Pertinent history:  Paroxysmal A. fib: Had 1 episode of A. fib at the age of 34 while he was in CAD with associated palpitations.  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  No recurrent episodes since then.  Essential hypertension: Diagnosed in 2014.    Migraines with aura with aura being vertigo and blurred vision    Last clinic visit: 3/16/2023 with Isatu Molina P.A.-C    Interim History:  Since his last visit, patient contracted COVID infection in March 2023.  Was admitted to the hospital.  Was having symptoms of palpitations, chest discomfort, elevated blood pressure and migraines.  Saw Isatu on 3/16/2023 who ordered exercise treadmill stress test.    Since then, symptoms have slowly improved.  Palpitations have resolved.  He is currently taking Coreg 3.125 mg once daily due to bradycardia.      Past Medical History:   Diagnosis Date    A-fib (HCC)     4 years ago, one episode only, echo/stress test normal    Geographic tongue     Hypertension          History reviewed. No pertinent surgical history.      Current Outpatient Medications   Medication Sig Dispense Refill    atorvastatin (LIPITOR) 80 MG tablet Take 1 Tablet by mouth every evening. 100 Tablet 3    losartan (COZAAR) 50 MG Tab Take 1 Tablet by mouth every day. 30 Tablet 0    carvedilol (COREG) 3.125 MG Tab Take 1 tab every morning.  May take another 1 tablet at night as  needed at night for palpitations/fast heart rate >100. 60 Tablet 0    aspirin EC (ECOTRIN) 81 MG Tablet Delayed Response Take 1 Tablet by mouth every day. 90 Tablet 3    ibuprofen (MOTRIN) 400 MG Tab Take 600 mg by mouth every 6 hours as needed for Mild Pain. 30 Tablet     Vitamin D, Cholecalciferol, 25 MCG (1000 UT) Cap Take 2,000 Units by mouth every day. 60 Capsule 0     No current facility-administered medications for this visit.         Allergies   Allergen Reactions    Diltiazem Hcl      Other reaction(s): Headache    Lisinopril      cough    Other Drug      Can't remember.  Few yrs ago. Anti-anxiety pill         Family History   Problem Relation Age of Onset    Diabetes Mother     Lung Disease Son          Social History     Socioeconomic History    Marital status:      Spouse name: Not on file    Number of children: Not on file    Years of education: Not on file    Highest education level: Not on file   Occupational History    Not on file   Tobacco Use    Smoking status: Former     Packs/day: 0.25     Years: 1.00     Pack years: 0.25     Types: Cigarettes    Smokeless tobacco: Never   Vaping Use    Vaping Use: Never used   Substance and Sexual Activity    Alcohol use: Yes     Alcohol/week: 1.8 oz     Types: 1 Glasses of wine, 1 Cans of beer, 1 Shots of liquor per week     Comment: Occ    Drug use: Not Currently     Types: Marijuana    Sexual activity: Yes     Partners: Female     Comment: wife, Fern   Other Topics Concern    Not on file   Social History Narrative    Not on file     Social Determinants of Health     Financial Resource Strain: Not on file   Food Insecurity: Not on file   Transportation Needs: Not on file   Physical Activity: Not on file   Stress: Not on file   Social Connections: Not on file   Intimate Partner Violence: Not on file   Housing Stability: Not on file         Physical Exam:  Ambulatory Vitals  /80 (BP Location: Left arm, Patient Position: Sitting, BP Cuff Size: Adult)    Pulse (!) 50   Resp 16   Ht 1.829 m (6')   Wt 92.1 kg (203 lb)   SpO2 95%    Oxygen Therapy:  Pulse Oximetry: 95 %  BP Readings from Last 4 Encounters:   04/17/23 130/80   03/16/23 132/80   03/13/23 (!) 141/76   03/06/23 (!) 178/102       Weight/BMI: Body mass index is 27.53 kg/m².  Wt Readings from Last 4 Encounters:   04/17/23 92.1 kg (203 lb)   03/16/23 95.3 kg (210 lb)   03/12/23 94.7 kg (208 lb 12.4 oz)   03/06/23 99.2 kg (218 lb 9.6 oz)       GEN: Well developed, well nourished and in no acute distress.  HEART: no significant JVD, regular rate and rhythm, normal S1 and S2, no murmurs, no third heart sounds, normal cardiac palpation  LUNG: clear to auscultation bilaterally, no wheezing, no crackles, normal respiratory effort on room air  ABDOMEN: soft, non-tender, non-distended, normal bowel sounds throughout  EXTREMITIES: no peripheral edema noted  VASCULAR: no significantly elevated jugular venous pressure, no carotid bruits noted, radial pulses 2+ and equal      Lab Data Review:  Lab Results   Component Value Date/Time    CHOLSTRLTOT 132 03/12/2023 12:29 PM    LDL 63 03/12/2023 12:29 PM    HDL 53 03/12/2023 12:29 PM    TRIGLYCERIDE 82 03/12/2023 12:29 PM       Lab Results   Component Value Date/Time    SODIUM 138 03/13/2023 02:16 AM    POTASSIUM 4.3 03/13/2023 02:16 AM    CHLORIDE 105 03/13/2023 02:16 AM    CO2 24 03/13/2023 02:16 AM    GLUCOSE 98 03/13/2023 02:16 AM    BUN 14 03/13/2023 02:16 AM    CREATININE 0.92 03/13/2023 02:16 AM     Lab Results   Component Value Date/Time    ALKPHOSPHAT 68 03/12/2023 10:00 AM    ASTSGOT 12 03/12/2023 10:00 AM    ALTSGPT 30 03/12/2023 10:00 AM    TBILIRUBIN 1.1 03/12/2023 10:00 AM      Lab Results   Component Value Date/Time    WBC 6.1 03/13/2023 02:16 AM     Lab Results   Component Value Date/Time    HBA1C 5.2 11/12/2014 08:05 AM    HBA1C 5.0 11/15/2013 06:55 AM       Cardiac Imaging and Procedures Review:    EKG dated 10/10/2022: My personal interpretation is  sinus rhythm    Echo: Echocardiogram performed on 3/12/2023 was personally interpreted by me which shows normal LV size and function, EF 60%.  No wall motion abnormality.  No valve disease    Echo dated 3/30/2022: My personal interpretation is normal left ventricular size and function.  LVEF 60%.  No intracardiac shunt    ETT 3/30/2023:  Abnormal stress ECG  13 METS  Normal blood pressure response    Exercise stress testing (7/5/2022):    CONCLUSIONS   Positive stress ECG for ischemia with horizontal ST depression in    anterolateral leads may be false positive.   Excellent exercise capacity at 14.9 METS.   Normal blood pressure response.    Cardiac Event Monitor 4/6/2023:  Event Monitor Summary:  Time Monitored 14 days     1. Sinus rhythm with heart rate range from 38 to 145 bpm. Average heart rate 64 bpm.   2. Normal sinus node and AV node conduction normal. No pauses.   3. Rare PACs.   4. Rare PVCs.   5. No sustained rhythm changes. No atrial fibrillation/flutter.   6. Patient triggers were associated with sinus rhythm.     Conclusion: Normal event monitor results     Holter Monitor (4/1/2022):   Event Monitor Summary:  Time Monitored 14 days     1. Sinus rhythm with heart rate range from 36 to 142 bpm. Average heart rate 61 bpm.   2. Normal sinus node and AV node conduction normal. No pauses.   3. Rare PACs.   4. Rare PVCs.   5. No sustained rhythm changes. No atrial fibrillation/flutter.   6. Patient triggers were associated with sinus rhythm with heart rate range of 59-95 bpm.     Conclusion: Normal event monitor results     Cardiac event monitor (3/14/2022):   1.  No significant tiffany or tachyarrhythmias were detected.   2.  Rare PACs and rare PVCs were detected.   3.  There were 6 patient triggered events recorded.  Most patient events correlated with normal sinus rhythm.  One event possibly correlated with a PVC, although this may have been artifact.       Radiology test Review:  CTA Heart  1/4/2023:  Coronary calcification:  LMA - 0.0  LCX - 131.6  LAD - 44.3  RCA - 41.5     Total Calcium Score: 217.4   Percentile: Calcium score is above the 90th percentile for the patient's age and sex.    CTA neck 2/1/2022: Within normal limits      Assessment & Plan     1. Essential hypertension        2. Coronary artery disease involving native coronary artery of native heart without angina pectoris  atorvastatin (LIPITOR) 80 MG tablet    Lipid Profile      3. Palpitations        4. Dyslipidemia  atorvastatin (LIPITOR) 80 MG tablet    Lipid Profile          Doing well from a cardiovascular perspective.  Symptoms of palpitations, chest discomfort and elevated blood pressure have slowly improved post COVID infection.  BP within goal at home.  Continue losartan 50 mg daily.    For coronary artery disease, reviewed lipid panel.  Goal LDL 50 which we discussed.  Increase Lipitor to 40 mg/80 mg on alternate days.  Repeat lipid panel in 3 months to monitor LDL.        All of patient's excellent questions were answered to the best of my knowledge and to his satisfaction.  It was a pleasure seeing Mr. Jensen Donald in my clinic today. Return in about 6 months (around 10/17/2023). Patient is aware to call the cardiology clinic with any questions or concerns.      Jae Flowers MD  [facility]  [place] [facility], [facility]  [address] [address]  Phone: [phone]  Fax: [phone]    Please note that this dictation was created using voice recognition software. I have made every reasonable attempt to correct obvious errors, but it is possible there are errors of grammar and possibly content that I did not discover before finalizing the note.

## 2023-05-07 ENCOUNTER — PATIENT MESSAGE (OUTPATIENT)
Dept: CARDIOLOGY | Facility: MEDICAL CENTER | Age: 48
End: 2023-05-07
Payer: COMMERCIAL

## 2023-05-07 DIAGNOSIS — I10 ESSENTIAL HYPERTENSION: ICD-10-CM

## 2023-05-09 ENCOUNTER — OFFICE VISIT (OUTPATIENT)
Dept: MEDICAL GROUP | Facility: PHYSICIAN GROUP | Age: 48
End: 2023-05-09
Payer: COMMERCIAL

## 2023-05-09 VITALS
RESPIRATION RATE: 16 BRPM | TEMPERATURE: 99.3 F | SYSTOLIC BLOOD PRESSURE: 132 MMHG | WEIGHT: 202.8 LBS | HEIGHT: 72 IN | BODY MASS INDEX: 27.47 KG/M2 | HEART RATE: 60 BPM | DIASTOLIC BLOOD PRESSURE: 66 MMHG | OXYGEN SATURATION: 99 %

## 2023-05-09 DIAGNOSIS — I10 PRIMARY HYPERTENSION: ICD-10-CM

## 2023-05-09 DIAGNOSIS — I25.10 CORONARY ARTERY DISEASE INVOLVING NATIVE CORONARY ARTERY OF NATIVE HEART WITHOUT ANGINA PECTORIS: ICD-10-CM

## 2023-05-09 DIAGNOSIS — J00 ACUTE RHINITIS: ICD-10-CM

## 2023-05-09 DIAGNOSIS — E78.00 ELEVATED LDL CHOLESTEROL LEVEL: ICD-10-CM

## 2023-05-09 PROCEDURE — 99214 OFFICE O/P EST MOD 30 MIN: CPT | Performed by: FAMILY MEDICINE

## 2023-05-09 ASSESSMENT — FIBROSIS 4 INDEX: FIB4 SCORE: 0.52

## 2023-05-09 NOTE — LETTER
[facility]  Rosalee Vasquez M.D.  [address]  [place] [address]  Fax: [phone]   Authorization for Release/Disclosure of   Protected Health Information   Name: MERCEDEZ BRIDGES : 1975 SSN: [id number]   Address: [facility] [place] [address] Phone:    [phone] (home)    I authorize the entity listed below to release/disclose the PHI below to:   [facility] [employer]Rosalee Vasquez M.D. and Rosalee Vasquez M.D.   Provider or Entity Name: [facility]     Address   [facility] [address] [address]  [place] [address]   Phone: [phone]      Fax: [phone]     Reason for request: continuity of care   Information to be released:    [ XXX ] LAST COLONOSCOPY,  including any PATH REPORT and follow-up  [  ] LAST FIT/COLOGUARD RESULT [  ] LAST DEXA  [  ] LAST MAMMOGRAM  [  ] LAST PAP  [  ] LAST LABS [  ] RETINA EXAM REPORT  [  ] IMMUNIZATION RECORDS  [  ] Release all info      [  ] Check here and initial the line next to each item to release ALL health information INCLUDING  _____ Care and treatment for drug and / or alcohol abuse  _____ HIV testing, infection status, or AIDS  _____ Genetic Testing    DATES OF SERVICE OR TIME PERIOD TO BE DISCLOSED: _____________  I understand and acknowledge that:  * This Authorization may be revoked at any time by you in writing, except if your health information has already been used or disclosed.  * Your health information that will be used or disclosed as a result of you signing this authorization could be re-disclosed by the recipient. If this occurs, your re-disclosed health information may no longer be protected by State or Federal laws.  * You may refuse to sign this Authorization. Your refusal will not affect your ability to obtain treatment.  * This Authorization becomes effective upon signing and will  on (date) __________.      If no date is indicated, this Authorization will  one (1) year from the  signature date.    Name: Jensen Donald  Signature: Continuity of care Date:   5/9/2023     PLEASE FAX REQUESTED RECORDS BACK TO: [phone]

## 2023-05-09 NOTE — PROGRESS NOTES
Subjective:     CC:   Chief Complaint   Patient presents with    Follow-Up       HPI:   Jensen presents today for follow-up I have not seen him in over a year.  Patient states he had COVID in after COVID his blood pressure started going up.  Patient had to see a cardiologist also due to fact he was having palpitations.  Patient's calcium score is elevated also on stress test patient has had some signs of coronary artery disease without angina pectoris.  Patient is seeing cardiology for follow-up also is on atorvastatin for his increased cholesterol.  Patient states he is doing well otherwise.  Patient states that he has been having a little bit of a sore throat and some dry eyes at times.  And this been going on for less than a month.    Past Medical History:   Diagnosis Date    A-fib (HCC)     4 years ago, one episode only, echo/stress test normal    Geographic tongue     Hypertension        Social History     Tobacco Use    Smoking status: Former     Packs/day: 0.25     Years: 1.00     Pack years: 0.25     Types: Cigarettes    Smokeless tobacco: Never   Vaping Use    Vaping Use: Never used   Substance Use Topics    Alcohol use: Yes     Alcohol/week: 1.8 oz     Types: 1 Glasses of wine, 1 Cans of beer, 1 Shots of liquor per week     Comment: Occ    Drug use: Not Currently     Types: Marijuana       Current Outpatient Medications Ordered in Epic   Medication Sig Dispense Refill    atorvastatin (LIPITOR) 80 MG tablet Take 1 Tablet by mouth every evening. 100 Tablet 3    losartan (COZAAR) 50 MG Tab Take 1 Tablet by mouth every day. 30 Tablet 0    carvedilol (COREG) 3.125 MG Tab Take 1 tab every morning.  May take another 1 tablet at night as needed at night for palpitations/fast heart rate >100. 60 Tablet 0    aspirin EC (ECOTRIN) 81 MG Tablet Delayed Response Take 1 Tablet by mouth every day. 90 Tablet 3    ibuprofen (MOTRIN) 400 MG Tab Take 600 mg by mouth every 6 hours as needed for Mild Pain. 30 Tablet      Vitamin D, Cholecalciferol, 25 MCG (1000 UT) Cap Take 2,000 Units by mouth every day. 60 Capsule 0     No current Epic-ordered facility-administered medications on file.       Allergies:  Diltiazem hcl, Lisinopril, and Other drug    Health Maintenance: Completed    ROS:  Gen: no fevers/chills, no changes in weight  Eyes: no changes in vision  ENT: no sore throat, no hearing loss, no bloody nose  Pulm: no sob, no cough  CV: no chest pain, no palpitations  GI: no nausea/vomiting, no diarrhea  : no dysuria  Neuro: no headaches, no numbness/tingling  Heme/Lymph: no easy bruising    Objective:     Exam:  /66 (BP Location: Left arm, Patient Position: Sitting, BP Cuff Size: Large adult)   Pulse 60   Temp 37.4 °C (99.3 °F) (Temporal)   Resp 16   Ht 1.829 m (6')   Wt 92 kg (202 lb 12.8 oz)   SpO2 99%   BMI 27.50 kg/m²  Body mass index is 27.5 kg/m².    Gen: Alert and oriented, No apparent distress.  Skin: Warm and dry.  No obvious lesions.  Eyes: Sclera wnl Pupils normal in size  ENT: Canals wnl and TM are not red, there is fluid behind both TMs mouth no exudates noted  Lungs: Normal effort, CTA bilaterally, no wheezes, rhonchi, or rales  CV: Regular rate and rhythm. No murmurs, rubs, or gallops.  Musculoskeletal: Normal gait. No extremity cyanosis, clubbing, or edema.  Neuro: Oriented to person, place and time  Psych: Mood is wnl       Assessment & Plan:     47 y.o. male with the following -     1. Acute rhinitis  I would recommend patient start using Flonase nasal spray he has use it in the past but only as needed inform patient he needs to use it every day with everything blossoming right now and the weather changed it could cause problems with a runny nose and his sinuses.  Patient to follow-up if he continues having problems    2. Primary hypertension  His blood pressure looks at goal patient to continue present meds and follow-up with cardiology as planned.  Patient also has carvedilol listed but he was  told by his provider and cardiology to only take it as needed so he is not taking it regularly.  I did asked patient if he has plenty of losartan since there is only 30 tablets listed but patient states he has plenty of refills    3. Elevated LDL cholesterol level  Patient is taking atorvastatin should continue this    4. Coronary artery disease involving native coronary artery of native heart without angina pectoris  Patient is seeing cardiology for this       Return in about 4 months (around 9/9/2023), or if symptoms worsen or fail to improve.    Please note that this dictation was created using voice recognition software. I have made every reasonable attempt to correct obvious errors, but I expect that there are errors of grammar and possibly content that I did not discover before finalizing the note.

## 2023-05-16 RX ORDER — LOSARTAN POTASSIUM 50 MG/1
50 TABLET ORAL DAILY
Qty: 90 TABLET | Refills: 3 | Status: SHIPPED | OUTPATIENT
Start: 2023-05-16 | End: 2023-05-16

## 2023-05-16 RX ORDER — LOSARTAN POTASSIUM 50 MG/1
50 TABLET ORAL DAILY
Qty: 100 TABLET | Refills: 3 | Status: SHIPPED | OUTPATIENT
Start: 2023-05-16

## 2023-05-16 NOTE — PATIENT COMMUNICATION
Rx refilled for one year supply of losartan 50 mg daily to preferred pharmacy on file. BMP last done 03/12/2023. [employer] msg sent to pt.

## 2023-07-20 ENCOUNTER — TELEPHONE (OUTPATIENT)
Dept: CARDIOLOGY | Facility: MEDICAL CENTER | Age: 48
End: 2023-07-20
Payer: COMMERCIAL

## 2023-07-20 NOTE — TELEPHONE ENCOUNTER
KAITLYNN    Caller: Allison from [employer]    Topic/issue: Patient has a hearing today and [employer] put in a request for something which is in the patient's chart dated 07-. Their fax number is: [phone].    Callback Number: [phone]    Thank you,  -Anahy BEAR

## 2023-07-20 NOTE — TELEPHONE ENCOUNTER
Called [phone]  Was transferred to [facility]. No answer, left  to call [phone]

## 2023-10-03 ENCOUNTER — OFFICE VISIT (OUTPATIENT)
Dept: URGENT CARE | Facility: PHYSICIAN GROUP | Age: 48
End: 2023-10-03
Payer: COMMERCIAL

## 2023-10-03 VITALS
HEART RATE: 65 BPM | BODY MASS INDEX: 28.66 KG/M2 | SYSTOLIC BLOOD PRESSURE: 130 MMHG | HEIGHT: 72 IN | OXYGEN SATURATION: 100 % | RESPIRATION RATE: 18 BRPM | DIASTOLIC BLOOD PRESSURE: 90 MMHG | TEMPERATURE: 99.1 F | WEIGHT: 211.6 LBS

## 2023-10-03 DIAGNOSIS — M53.3 SACRAL PAIN: ICD-10-CM

## 2023-10-03 DIAGNOSIS — L24.3 IRRITANT CONTACT DERMATITIS DUE TO COSMETICS: ICD-10-CM

## 2023-10-03 PROCEDURE — 3080F DIAST BP >= 90 MM HG: CPT | Performed by: PHYSICIAN ASSISTANT

## 2023-10-03 PROCEDURE — 99213 OFFICE O/P EST LOW 20 MIN: CPT | Mod: 25 | Performed by: PHYSICIAN ASSISTANT

## 2023-10-03 PROCEDURE — 3075F SYST BP GE 130 - 139MM HG: CPT | Performed by: PHYSICIAN ASSISTANT

## 2023-10-03 RX ORDER — ATORVASTATIN CALCIUM 40 MG/1
40 TABLET, FILM COATED ORAL EVERY EVENING
COMMUNITY
Start: 2023-09-03 | End: 2023-10-17

## 2023-10-03 RX ORDER — PANTOPRAZOLE SODIUM 20 MG/1
TABLET, DELAYED RELEASE ORAL
COMMUNITY
End: 2023-10-17

## 2023-10-03 RX ORDER — TRIAMCINOLONE ACETONIDE 1 MG/G
CREAM TOPICAL
Qty: 30 G | Refills: 0 | Status: SHIPPED | OUTPATIENT
Start: 2023-10-03 | End: 2024-01-30

## 2023-10-03 RX ORDER — LOSARTAN POTASSIUM 25 MG/1
1 TABLET ORAL
COMMUNITY
End: 2023-10-17

## 2023-10-03 RX ORDER — SUMATRIPTAN 100 MG/1
TABLET, FILM COATED ORAL
COMMUNITY
End: 2023-11-03

## 2023-10-03 RX ORDER — MECLIZINE HYDROCHLORIDE 25 MG/1
1 TABLET ORAL 3 TIMES DAILY PRN
COMMUNITY
End: 2023-10-17

## 2023-10-03 RX ORDER — PREDNISONE 20 MG/1
TABLET ORAL
Qty: 5 TABLET | Refills: 0 | Status: SHIPPED | OUTPATIENT
Start: 2023-10-03 | End: 2024-01-30

## 2023-10-03 RX ORDER — PANTOPRAZOLE SODIUM 40 MG/1
TABLET, DELAYED RELEASE ORAL
COMMUNITY
End: 2023-10-17

## 2023-10-03 ASSESSMENT — ENCOUNTER SYMPTOMS
FEVER: 0
CHILLS: 0
SHORTNESS OF BREATH: 0
WHEEZING: 0

## 2023-10-03 ASSESSMENT — FIBROSIS 4 INDEX: FIB4 SCORE: 0.53

## 2023-10-04 NOTE — PROGRESS NOTES
Subjective:   Jensen Donald is a 48 y.o. male who presents today with   Chief Complaint   Patient presents with    Rash     On whole body. Had a massage and now a rash        Rash  This is a new problem. Episode onset: 4 days. The problem is unchanged. The rash is diffuse. Pertinent negatives include no fever or shortness of breath. Past treatments include nothing. The treatment provided no relief.     Patient states that the rash started a day or 2 after he got a massage where they did use a lot of oils.    Patient also notes he would like to have a referral placed today for [facility] as he states he has been having tailbone pain for about a month.  No recent injury or trauma.  States he did see a chiropractor but would like a referral to [facility].  He states the tailbone pain is only noticeable with direct palpation or when sitting.  He does not notice it when walking around.    PMH:  has a past medical history of A-fib (HCC), Geographic tongue, and Hypertension.  MEDS:   Current Outpatient Medications:     predniSONE (DELTASONE) 20 MG Tab, Take 1 tab once daily for 5 days., Disp: 5 Tablet, Rfl: 0    triamcinolone acetonide (KENALOG) 0.1 % Cream, Apply thin layer to affected area twice daily for up to 2 weeks., Disp: 30 g, Rfl: 0    atorvastatin (LIPITOR) 80 MG tablet, Take 1 Tablet by mouth every evening., Disp: 100 Tablet, Rfl: 3    aspirin EC (ECOTRIN) 81 MG Tablet Delayed Response, Take 1 Tablet by mouth every day., Disp: 90 Tablet, Rfl: 3    ibuprofen (MOTRIN) 400 MG Tab, Take 600 mg by mouth every 6 hours as needed for Mild Pain., Disp: 30 Tablet, Rfl:     atorvastatin (LIPITOR) 40 MG Tab, Take 40 mg by mouth every evening. (Patient not taking: Reported on 10/3/2023), Disp: , Rfl:     losartan (COZAAR) 25 MG Tab, Take 1 Tablet by mouth every day. (Patient not taking: Reported on 10/3/2023), Disp: , Rfl:     meclizine (ANTIVERT) 25 MG Tab, Take 1 Tablet by mouth 3 times  a day as needed. (Patient not taking: Reported on 10/3/2023), Disp: , Rfl:     pantoprazole (PROTONIX) 20 MG tablet, TAKE 1 TABLET BY MOUTH EVERY DAY 30 MINUTES BEFORE BREAKFAST MEAL (Patient not taking: Reported on 10/3/2023), Disp: , Rfl:     pantoprazole (PROTONIX) 40 MG Tablet Delayed Response, TAKE 1 TABLET BY MOUTH 30 MINUTES BEFORE BREAKFAST DAILY (Patient not taking: Reported on 10/3/2023), Disp: , Rfl:     sumatriptan (IMITREX) 100 MG tablet, TAKE 1/2 TO 1 TABLET BY MOUTH AT ONSET OF HEADACHE. MAY REPEAT DOSE IN 2 HOURS IF UNRELIEVED. DO NOT EXCEED MORE THAN 2 TABLETS IN 24 HOURS (Patient not taking: Reported on 10/3/2023), Disp: , Rfl:     losartan (COZAAR) 50 MG Tab, Take 1 Tablet by mouth every day. (Patient not taking: Reported on 10/3/2023), Disp: 100 Tablet, Rfl: 3    carvedilol (COREG) 3.125 MG Tab, Take 1 tab every morning.  May take another 1 tablet at night as needed at night for palpitations/fast heart rate >100., Disp: 60 Tablet, Rfl: 0    Vitamin D, Cholecalciferol, 25 MCG (1000 UT) Cap, Take 2,000 Units by mouth every day. (Patient not taking: Reported on 10/3/2023), Disp: 60 Capsule, Rfl: 0  ALLERGIES:   Allergies   Allergen Reactions    Diltiazem Hcl      Other reaction(s): Headache    Lisinopril      cough  Other reaction(s): cough    Other Drug      Can't remember.  Few yrs ago. Anti-anxiety pill     SURGHX: No past surgical history on file.  SOCHX:  reports that he has quit smoking. His smoking use included cigarettes. He has a 0.3 pack-year smoking history. He has never used smokeless tobacco. He reports current alcohol use of about 1.8 oz of alcohol per week. He reports that he does not currently use drugs after having used the following drugs: Marijuana.  FH: Reviewed with patient, not pertinent to this visit.     Review of Systems   Constitutional:  Negative for chills and fever.   Respiratory:  Negative for shortness of breath and wheezing.    Skin:  Positive for itching and rash.       Objective:   BP (!) 130/90   Pulse 65   Temp 37.3 °C (99.1 °F) (Temporal)   Resp 18   Ht 1.829 m (6')   Wt 96 kg (211 lb 9.6 oz)   SpO2 100%   BMI 28.70 kg/m²   Physical Exam  Vitals and nursing note reviewed.   Constitutional:       General: He is not in acute distress.     Appearance: Normal appearance. He is well-developed. He is not ill-appearing or toxic-appearing.   HENT:      Head: Normocephalic and atraumatic.      Right Ear: Hearing normal.      Left Ear: Hearing normal.   Cardiovascular:      Rate and Rhythm: Normal rate.   Pulmonary:      Effort: Pulmonary effort is normal.   Musculoskeletal:      Comments: Normal movement in all 4 extremities.   Patient notes tenderness to palpation to the sacral area/tailbone when he palpates the area.     Skin:     General: Skin is warm and dry.      Comments: Diffuse erythematous/urticarial rash throughout the body highly concentrated to the legs as well as the torso.  No pustules or vesicles.  No drainage or discharge.   Neurological:      Mental Status: He is alert.      Coordination: Coordination normal.   Psychiatric:         Mood and Affect: Mood normal.       Assessment/Plan:   Assessment    1. Irritant contact dermatitis due to cosmetics  - predniSONE (DELTASONE) 20 MG Tab; Take 1 tab once daily for 5 days.  Dispense: 5 Tablet; Refill: 0  - triamcinolone acetonide (KENALOG) 0.1 % Cream; Apply thin layer to affected area twice daily for up to 2 weeks.  Dispense: 30 g; Refill: 0    2. Sacral pain  - Referral to Orthopedics    Other orders  - atorvastatin (LIPITOR) 40 MG Tab; Take 40 mg by mouth every evening. (Patient not taking: Reported on 10/3/2023)  - losartan (COZAAR) 25 MG Tab; Take 1 Tablet by mouth every day. (Patient not taking: Reported on 10/3/2023)  - meclizine (ANTIVERT) 25 MG Tab; Take 1 Tablet by mouth 3 times a day as needed. (Patient not taking: Reported on 10/3/2023)  - pantoprazole (PROTONIX) 20 MG tablet; TAKE 1 TABLET BY MOUTH  EVERY DAY 30 MINUTES BEFORE BREAKFAST MEAL (Patient not taking: Reported on 10/3/2023)  - pantoprazole (PROTONIX) 40 MG Tablet Delayed Response; TAKE 1 TABLET BY MOUTH 30 MINUTES BEFORE BREAKFAST DAILY (Patient not taking: Reported on 10/3/2023)  - sumatriptan (IMITREX) 100 MG tablet; TAKE 1/2 TO 1 TABLET BY MOUTH AT ONSET OF HEADACHE. MAY REPEAT DOSE IN 2 HOURS IF UNRELIEVED. DO NOT EXCEED MORE THAN 2 TABLETS IN 24 HOURS (Patient not taking: Reported on 10/3/2023)    Rash appears consistent with contact dermatitis secondary to likely the oils that were used when he was having his massage the other day.  We will place referral to [facility] per his request for sacral pain he has been having for a month now.  No acute concerning findings regarding this at this time.  Suggest that patient use over-the-counter Claritin or Zyrtec per 's instructions along with steroid and topical steroid to more concentrated areas of itching and rash.    Differential diagnosis, natural history, supportive care, and indications for immediate follow-up discussed.   Patient given instructions and understanding of medications and treatment.    If not improving in 3-5 days, F/U with PCP or return to  if symptoms worsen.    Patient agreeable to plan.      Please note that this dictation was created using voice recognition software. I have made every reasonable attempt to correct obvious errors, but I expect that there are errors of grammar and possibly content that I did not discover before finalizing the note.    Erick Ivey PA-C

## 2023-10-17 ENCOUNTER — OFFICE VISIT (OUTPATIENT)
Dept: CARDIOLOGY | Facility: MEDICAL CENTER | Age: 48
End: 2023-10-17
Attending: INTERNAL MEDICINE
Payer: COMMERCIAL

## 2023-10-17 VITALS
RESPIRATION RATE: 16 BRPM | OXYGEN SATURATION: 99 % | HEIGHT: 72 IN | HEART RATE: 58 BPM | DIASTOLIC BLOOD PRESSURE: 76 MMHG | SYSTOLIC BLOOD PRESSURE: 128 MMHG | WEIGHT: 229 LBS | BODY MASS INDEX: 31.02 KG/M2

## 2023-10-17 DIAGNOSIS — I10 PRIMARY HYPERTENSION: ICD-10-CM

## 2023-10-17 DIAGNOSIS — E78.2 MIXED HYPERLIPIDEMIA: ICD-10-CM

## 2023-10-17 DIAGNOSIS — I25.10 CORONARY ARTERY DISEASE INVOLVING NATIVE CORONARY ARTERY OF NATIVE HEART WITHOUT ANGINA PECTORIS: ICD-10-CM

## 2023-10-17 DIAGNOSIS — I48.0 PAF (PAROXYSMAL ATRIAL FIBRILLATION) (HCC): ICD-10-CM

## 2023-10-17 PROCEDURE — 99212 OFFICE O/P EST SF 10 MIN: CPT | Performed by: INTERNAL MEDICINE

## 2023-10-17 PROCEDURE — 3074F SYST BP LT 130 MM HG: CPT | Performed by: INTERNAL MEDICINE

## 2023-10-17 PROCEDURE — 99214 OFFICE O/P EST MOD 30 MIN: CPT | Performed by: INTERNAL MEDICINE

## 2023-10-17 PROCEDURE — 3078F DIAST BP <80 MM HG: CPT | Performed by: INTERNAL MEDICINE

## 2023-10-17 RX ORDER — ATORVASTATIN CALCIUM 80 MG/1
80 TABLET, FILM COATED ORAL EVERY EVENING
Qty: 90 TABLET | Refills: 3 | Status: SHIPPED | OUTPATIENT
Start: 2023-10-17

## 2023-10-17 ASSESSMENT — ENCOUNTER SYMPTOMS
ORTHOPNEA: 0
SHORTNESS OF BREATH: 0
DIZZINESS: 0
COUGH: 0
PND: 0
LOSS OF CONSCIOUSNESS: 0
PALPITATIONS: 0
MYALGIAS: 0

## 2023-10-17 ASSESSMENT — FIBROSIS 4 INDEX: FIB4 SCORE: 0.53

## 2023-10-17 NOTE — PROGRESS NOTES
Cardiology Initial Consultation Note    Date of note:    10/17/2023    Primary Care Provider: Rosalee Vasquez M.D.  Referring Provider: No ref. provider found    Patient Name: Jensen Donald   YOB: 1975  MRN:              [id number]    Chief Complaint   Patient presents with    Hypertension       History of Present Illness: Mr. Jensen Donadl is a 48-year-old man with past medical history significant for remote history of atrial fibrillation, hypertension, CAD, dyslipidemia who presents to the cardiology office for follow-up.    Patient is known to Dr. Flowers and was last seen in the office in April 2023.  During that visit, patient was stable from a cardiovascular standpoint without palpitations or chest pain.  He was continued on cardiac medications and asked to follow-up in 6 months.    The patient continues to do well from a cardiovascular standpoint.  He states that he has been compliant with his cardiac medications.  He exercises at the gym almost on a daily basis.  Has not experienced exertional chest pain or dyspnea.  He denies having lower extremity edema, orthopnea or PND.  No episodes of syncope.    Regarding his history of atrial fibrillation.  He tells me that he had 1 episode of atrial fibrillation approximately 10 to 15 years ago.  This was in the setting of synthetic steroid use/abuse while he was strength training/weightlifting.  Since then, he has not had recurrence of atrial fibrillation.  He has had several cardiac monitors performed over the past few years which have all been negative for atrial fibrillation.    Cardiovascular Risk Factors:  1. Smoking status: Denies  2. Type II Diabetes Mellitus: Denies  Lab Results   Component Value Date/Time    HBA1C 5.2 11/12/2014 08:05 AM    HBA1C 5.0 11/15/2013 06:55 AM     3. Hypertension: Yes  4. Dyslipidemia: Yes  Cholesterol,Tot   Date Value Ref Range Status   03/12/2023 132 100 - 199 mg/dL Final     LDL   Date Value Ref  Range Status   03/12/2023 63 <100 mg/dL Final     HDL   Date Value Ref Range Status   03/12/2023 53 >=40 mg/dL Final     Triglycerides   Date Value Ref Range Status   03/12/2023 82 0 - 149 mg/dL Final     5. Family history of early Coronary Artery Disease in a first degree relative (Male less than 55 years of age; Female less than 65 years of age): Denies      Review of Systems:  Review of Systems   Constitutional:  Negative for malaise/fatigue.   Respiratory:  Negative for cough and shortness of breath.    Cardiovascular:  Negative for chest pain, palpitations, orthopnea, leg swelling and PND.   Musculoskeletal:  Negative for joint pain and myalgias.   Neurological:  Negative for dizziness and loss of consciousness.       Past Medical History:   Diagnosis Date    A-fib (HCC)     4 years ago, one episode only, echo/stress test normal    Geographic tongue     Hypertension          History reviewed. No pertinent surgical history.      Current Outpatient Medications   Medication Sig Dispense Refill    atorvastatin (LIPITOR) 80 MG tablet Take 1 Tablet by mouth every evening. 90 Tablet 3    sumatriptan (IMITREX) 100 MG tablet       predniSONE (DELTASONE) 20 MG Tab Take 1 tab once daily for 5 days. 5 Tablet 0    triamcinolone acetonide (KENALOG) 0.1 % Cream Apply thin layer to affected area twice daily for up to 2 weeks. 30 g 0    losartan (COZAAR) 50 MG Tab Take 1 Tablet by mouth every day. 100 Tablet 3    aspirin EC (ECOTRIN) 81 MG Tablet Delayed Response Take 1 Tablet by mouth every day. 90 Tablet 3    ibuprofen (MOTRIN) 400 MG Tab Take 600 mg by mouth every 6 hours as needed for Mild Pain. 30 Tablet     Vitamin D, Cholecalciferol, 25 MCG (1000 UT) Cap Take 2,000 Units by mouth every day. 60 Capsule 0     No current facility-administered medications for this visit.         Allergies   Allergen Reactions    Diltiazem Hcl      Other reaction(s): Headache    Bee Venom     Lisinopril      cough  Other reaction(s): cough     Other Drug      Can't remember.  Few yrs ago. Anti-anxiety pill         Family History   Problem Relation Age of Onset    Diabetes Mother     Lung Disease Son          Social History     Socioeconomic History    Marital status:      Spouse name: Not on file    Number of children: Not on file    Years of education: Not on file    Highest education level: Not on file   Occupational History    Not on file   Tobacco Use    Smoking status: Former     Current packs/day: 0.25     Average packs/day: 0.3 packs/day for 1 year (0.3 ttl pk-yrs)     Types: Cigarettes    Smokeless tobacco: Never   Vaping Use    Vaping Use: Never used   Substance and Sexual Activity    Alcohol use: Yes     Alcohol/week: 1.8 oz     Types: 1 Glasses of wine, 1 Cans of beer, 1 Shots of liquor per week     Comment: Occ    Drug use: Not Currently     Types: Marijuana    Sexual activity: Yes     Partners: Female     Comment: wife, Fern   Other Topics Concern    Not on file   Social History Narrative    Not on file     Social Determinants of Health     Financial Resource Strain: Not on file   Food Insecurity: Not on file   Transportation Needs: Not on file   Physical Activity: Not on file   Stress: Not on file   Social Connections: Not on file   Intimate Partner Violence: Not on file   Housing Stability: Not on file         Physical Exam:  Ambulatory Vitals  /76 (BP Location: Left arm, Patient Position: Sitting)   Pulse (!) 58   Resp 16   Ht 1.829 m (6')   Wt 104 kg (229 lb)   SpO2 99%    Oxygen Therapy:  Pulse Oximetry: 99 %  BP Readings from Last 4 Encounters:   10/17/23 128/76   10/03/23 (!) 130/90   05/09/23 132/66   04/17/23 130/80       Weight/BMI: Body mass index is 31.06 kg/m².  Wt Readings from Last 4 Encounters:   10/17/23 104 kg (229 lb)   10/03/23 96 kg (211 lb 9.6 oz)   05/09/23 92 kg (202 lb 12.8 oz)   04/17/23 92.1 kg (203 lb)         General: Not in acute distress, appears comfortable  HEENT: OP clear   Neck:  No carotid  bruits, No JVD appreciated  CVS:  RRR, Normal S1, S2. No murmurs, rubs or gallops  Resp: Normal respiratory effort, lungs CTA bilaterally. No rales or rhonchi  Abdomen: Soft, non-distended, non-tender to palpation, no guarding or rigidity  Skin: No obvious rashes, no cyanosis  Neurological: Alert and oriented x3, moves all extremities, no focal neurologic deficits  Psychiatric: Appropriate affect  Extremities:   Extremities warm, 2+ bilateral radial pulses.  2+ bilateral dp pulses, no lower extremity edema bilaterally      Lab Data Review:  Lab Results   Component Value Date/Time    CHOLSTRLTOT 132 03/12/2023 12:29 PM    LDL 63 03/12/2023 12:29 PM    HDL 53 03/12/2023 12:29 PM    TRIGLYCERIDE 82 03/12/2023 12:29 PM       Lab Results   Component Value Date/Time    SODIUM 138 03/13/2023 02:16 AM    POTASSIUM 4.3 03/13/2023 02:16 AM    CHLORIDE 105 03/13/2023 02:16 AM    CO2 24 03/13/2023 02:16 AM    GLUCOSE 98 03/13/2023 02:16 AM    BUN 14 03/13/2023 02:16 AM    CREATININE 0.92 03/13/2023 02:16 AM     Lab Results   Component Value Date/Time    ALKPHOSPHAT 68 03/12/2023 10:00 AM    ASTSGOT 12 03/12/2023 10:00 AM    ALTSGPT 30 03/12/2023 10:00 AM    TBILIRUBIN 1.1 03/12/2023 10:00 AM      Lab Results   Component Value Date/Time    WBC 6.1 03/13/2023 02:16 AM    HEMOGLOBIN 14.0 03/13/2023 02:16 AM     Lab Results   Component Value Date/Time    HBA1C 5.2 11/12/2014 08:05 AM    HBA1C 5.0 11/15/2013 06:55 AM         Cardiac Imaging and Procedures Review:    EKG dated 03/2023:   Sinus rhythm, LVH, nonspecific ST-T changes    Echo 3/2023:  Compared to the prior study on 3-30-22, no changes  The left ventricular ejection fraction is visually estimated to be 60%.  Normal regional wall motion.  Estimated right ventricular systolic pressure is 25 mmHg.    Coronary CTA 1/2023:   Coronary calcification:  LMA - 0.0  LCX - 131.6  LAD - 44.3  RCA - 41.5     Total Calcium Score: 217.4     Coronary CTA:  Dominance: Right dominant  circulation.     Left Main: Large caliber vessel with a normal takeoff from the left coronary cusp that bifurcates to form a left anterior descending artery and a left circumflex artery. No plaque or stenosis.     LAD and Diagonals: Patent. Multifocal, predominantly calcific plaque in the proximal and mid LAD results in less than 50% stenosis. No ramus intermedius.     LCX and Obtuse Marginals: Patent. Soft plaque in the proximal LCx results in less than 50% stenosis. Mixed plaque in the mid LCx results in approximately 50% stenosis.     RCA, Posterior Descending and Posterolateral Branches: Patent. Minimal calcific plaque in the mid RCA results in no hemodynamically significant stenosis.    1.  Coronary calcium score of 217.4.  2.  Triple vessel coronary artery disease, with mixed plaque in the mid LCx resulting in approximately 50% stenosis.  3.  Mild, predominantly calcific plaque in the LAD and RCA results in no hemodynamically significant stenosis.    Cardiac Monitor 04/2023:  Event Monitor Summary:  Time Monitored 14 days     1. Sinus rhythm with heart rate range from 38 to 145 bpm. Average heart rate 64 bpm.   2. Normal sinus node and AV node conduction normal. No pauses.   3. Rare PACs.   4. Rare PVCs.   5. No sustained rhythm changes. No atrial fibrillation/flutter.   6. Patient triggers were associated with sinus rhythm.     Cardiac Monitor 04/2022:  Event Monitor Summary:  Time Monitored 14 days     1. Sinus rhythm with heart rate range from 36 to 142 bpm. Average heart rate 61 bpm.   2. Normal sinus node and AV node conduction normal. No pauses.   3. Rare PACs.   4. Rare PVCs.   5. No sustained rhythm changes. No atrial fibrillation/flutter.   6. Patient triggers were associated with sinus rhythm with heart rate range of 59-95 bpm.     Assessment & Plan     1. Coronary artery disease involving native coronary artery of native heart without angina pectoris  atorvastatin (LIPITOR) 80 MG tablet      2. PAF  (paroxysmal atrial fibrillation) (HCC)        3. Primary hypertension        4. Mixed hyperlipidemia              Medical Decision Making:  Mr. Jensen Donald is a 48-year-old man with past medical history significant for remote history of atrial fibrillation, hypertension, CAD, dyslipidemia who presents to the cardiology office for follow-up.    1. Coronary artery disease involving native coronary artery of native heart without angina pectoris  - Review of Coronary CTA performed earlier this year shows presence of <50% stenosis involving LAD and 50% stenosis of mid LCX.   - Currently without chest pain. Exercises daily at the gym and has not experienced exertional chest pain or dyspnea.  - Continue atorvastatin 80mg daily. LDL currently at goal. Repeat lipid panel in 1 year  - Not currently on BB due to bradycardia.    2. PAF (paroxysmal atrial fibrillation) (HCC)  - Remote history of AF > 10 years ago. Occurred in the setting of strength training and synthetic steroid use. He has not had recurrence since. Had cardiac monitoring performed in 2023 and 2022 which shows no evidence of AF. Not currently on OAC. Certainly, if he has recurrence of AF, given history of HTN and CAD, would need to consider starting OAC.    3. Primary hypertension  - Blood pressure mildly elevated during OV but improved on repeat. /76 mmHg. Currently at goal BP < 130/80mmHg. No changes to BP meds. He has only been maintained on Losartan 50mg daily. Previously on Coreg 3.125mg BID but discontinued by Dr. Flowers due to resting bradycardia.  - Advised to maintain BP log at home and check BP regularly. If BP consistently elevated, he has been instructed to call our office for uptitration of Losartan.    4. Mixed hyperlipidemia  - Continue atorvastatin 80mg daily. LDL at goal and well controlled. Discussed lifestyle modifications including exercise and dietary changes.  Recommend at least 150 minutes of moderate intensity exercise or 75  minutes of vigorous aerobic activity on a weekly basis.  Recommend dietary changes including incorporating heart healthy, AHA/Mediterranean diet.      It was a pleasure seeing Mr. Jensen Donald in the office today. Return in about 6 months (around 4/17/2024) for Coronary artery disease. Patient is aware to call the cardiology clinic with any questions or concerns.      Filemon Oviedo MD, [facility]  Cardiologist, [facility] Medicine, [facility].  [address] [address]  Phone: [phone]  Fax: [phone]    Please note that this dictation was created using voice recognition software. I have made every reasonable attempt to correct obvious errors, but it is possible there are errors of grammar and possibly content that I did not discover before finalizing the note.

## 2023-11-02 ENCOUNTER — APPOINTMENT (OUTPATIENT)
Dept: RADIOLOGY | Facility: MEDICAL CENTER | Age: 48
End: 2023-11-02
Attending: STUDENT IN AN ORGANIZED HEALTH CARE EDUCATION/TRAINING PROGRAM
Payer: COMMERCIAL

## 2023-11-02 ENCOUNTER — HOSPITAL ENCOUNTER (EMERGENCY)
Facility: MEDICAL CENTER | Age: 48
End: 2023-11-02
Attending: STUDENT IN AN ORGANIZED HEALTH CARE EDUCATION/TRAINING PROGRAM
Payer: COMMERCIAL

## 2023-11-02 VITALS
BODY MASS INDEX: 28.46 KG/M2 | TEMPERATURE: 97 F | HEART RATE: 61 BPM | SYSTOLIC BLOOD PRESSURE: 164 MMHG | RESPIRATION RATE: 17 BRPM | WEIGHT: 209.88 LBS | OXYGEN SATURATION: 98 % | DIASTOLIC BLOOD PRESSURE: 79 MMHG

## 2023-11-02 DIAGNOSIS — N45.1 EPIDIDYMITIS: ICD-10-CM

## 2023-11-02 DIAGNOSIS — N50.812 PAIN IN LEFT TESTICLE: ICD-10-CM

## 2023-11-02 DIAGNOSIS — N43.0 ENCYSTED HYDROCELE: ICD-10-CM

## 2023-11-02 LAB
APPEARANCE UR: CLEAR
BILIRUB UR QL STRIP.AUTO: NEGATIVE
C TRACH DNA SPEC QL NAA+PROBE: NEGATIVE
COLOR UR: YELLOW
GLUCOSE UR STRIP.AUTO-MCNC: NEGATIVE MG/DL
KETONES UR STRIP.AUTO-MCNC: NEGATIVE MG/DL
LEUKOCYTE ESTERASE UR QL STRIP.AUTO: NEGATIVE
MICRO URNS: NORMAL
N GONORRHOEA DNA SPEC QL NAA+PROBE: NEGATIVE
NITRITE UR QL STRIP.AUTO: NEGATIVE
PH UR STRIP.AUTO: 7 [PH] (ref 5–8)
PROT UR QL STRIP: NEGATIVE MG/DL
RBC UR QL AUTO: NEGATIVE
SP GR UR STRIP.AUTO: 1.02
SPECIMEN SOURCE: NORMAL
UROBILINOGEN UR STRIP.AUTO-MCNC: 0.2 MG/DL

## 2023-11-02 PROCEDURE — 81003 URINALYSIS AUTO W/O SCOPE: CPT

## 2023-11-02 PROCEDURE — 87491 CHLMYD TRACH DNA AMP PROBE: CPT

## 2023-11-02 PROCEDURE — 700102 HCHG RX REV CODE 250 W/ 637 OVERRIDE(OP): Performed by: STUDENT IN AN ORGANIZED HEALTH CARE EDUCATION/TRAINING PROGRAM

## 2023-11-02 PROCEDURE — 76870 US EXAM SCROTUM: CPT

## 2023-11-02 PROCEDURE — 99284 EMERGENCY DEPT VISIT MOD MDM: CPT

## 2023-11-02 PROCEDURE — A9270 NON-COVERED ITEM OR SERVICE: HCPCS | Performed by: STUDENT IN AN ORGANIZED HEALTH CARE EDUCATION/TRAINING PROGRAM

## 2023-11-02 PROCEDURE — 87591 N.GONORRHOEAE DNA AMP PROB: CPT

## 2023-11-02 RX ORDER — ACETAMINOPHEN 325 MG/1
650 TABLET ORAL ONCE
Status: COMPLETED | OUTPATIENT
Start: 2023-11-02 | End: 2023-11-02

## 2023-11-02 RX ORDER — IBUPROFEN 600 MG/1
600 TABLET ORAL ONCE
Status: COMPLETED | OUTPATIENT
Start: 2023-11-02 | End: 2023-11-02

## 2023-11-02 RX ORDER — NAPROXEN 500 MG/1
500 TABLET ORAL 2 TIMES DAILY WITH MEALS
Qty: 60 TABLET | Refills: 0 | Status: SHIPPED | OUTPATIENT
Start: 2023-11-02 | End: 2024-01-30

## 2023-11-02 RX ADMIN — ACETAMINOPHEN 650 MG: 325 TABLET, FILM COATED ORAL at 17:36

## 2023-11-02 RX ADMIN — IBUPROFEN 600 MG: 600 TABLET, FILM COATED ORAL at 17:36

## 2023-11-02 ASSESSMENT — PAIN DESCRIPTION - PAIN TYPE
TYPE: ACUTE PAIN
TYPE: ACUTE PAIN

## 2023-11-02 ASSESSMENT — FIBROSIS 4 INDEX: FIB4 SCORE: 0.53

## 2023-11-02 NOTE — ED TRIAGE NOTES
Chief Complaint   Patient presents with    Testicle Pain     L sided testicle pain x 1-2 wks. Blood in sperm 1 wk ago. Denies hematuria.         BP (!) 158/85   Pulse 62   Temp 36.1 °C (97 °F) (Temporal)   Resp 16   SpO2 98%

## 2023-11-03 ENCOUNTER — OFFICE VISIT (OUTPATIENT)
Dept: MEDICAL GROUP | Facility: PHYSICIAN GROUP | Age: 48
End: 2023-11-03
Payer: COMMERCIAL

## 2023-11-03 VITALS
BODY MASS INDEX: 28.06 KG/M2 | DIASTOLIC BLOOD PRESSURE: 68 MMHG | HEIGHT: 72 IN | OXYGEN SATURATION: 99 % | TEMPERATURE: 98.7 F | WEIGHT: 207.2 LBS | RESPIRATION RATE: 16 BRPM | SYSTOLIC BLOOD PRESSURE: 116 MMHG | HEART RATE: 61 BPM

## 2023-11-03 DIAGNOSIS — I10 PRIMARY HYPERTENSION: ICD-10-CM

## 2023-11-03 DIAGNOSIS — E78.00 ELEVATED LDL CHOLESTEROL LEVEL: ICD-10-CM

## 2023-11-03 DIAGNOSIS — N43.3 BILATERAL HYDROCELE: ICD-10-CM

## 2023-11-03 PROCEDURE — 99213 OFFICE O/P EST LOW 20 MIN: CPT | Performed by: FAMILY MEDICINE

## 2023-11-03 PROCEDURE — 3074F SYST BP LT 130 MM HG: CPT | Performed by: FAMILY MEDICINE

## 2023-11-03 PROCEDURE — 3078F DIAST BP <80 MM HG: CPT | Performed by: FAMILY MEDICINE

## 2023-11-03 ASSESSMENT — FIBROSIS 4 INDEX: FIB4 SCORE: 0.53

## 2023-11-03 NOTE — ED NOTES
Pt resting in gurney, denies needs at this time.  Pain is tolerable.  Pt updated on POC.  Pt ambulates to restroom with steady gait.

## 2023-11-03 NOTE — ED NOTES
Checked on bed, connected to monitor,  with unlabored respirations. Vital signs monitored  Denied any new complaints. No current needs identified.  Gurney in low position, side rail up for pt safety. Call light within reach.

## 2023-11-03 NOTE — DISCHARGE INSTRUCTIONS
You are seen evaluated for testicular pain, there are small bilateral hydroceles, noted on your ultrasound no normal Doppler flow, on examination today your epididymis was also tender and concerning for possible mild epididymitis though this was not appreciated on your ultrasound try taking as scheduled NSAIDs for the next 3 days follow-up with your PCP if you have severe worsening pain please return to the ER follow-up with urologist as needed and return with other concerns

## 2023-11-03 NOTE — PROGRESS NOTES
Subjective:     CC:   Chief Complaint   Patient presents with    Follow-Up       HPI:   Jensen presents today for follow-up from the ER yesterday.  Patient states he had left lower leg discomfort after running and it is getting better but he was seen there also he had left testicular pain and upon ejaculation had blood.  He did go to the ER for this.  I did inform patient that the cardiologist had ordered a lipid panel on him and earlier this year he has not gotten it done as of yet I would recommend he go ahead and get this test done.    Past Medical History:   Diagnosis Date    A-fib (HCC)     4 years ago, one episode only, echo/stress test normal    Geographic tongue     Hypertension        Social History     Tobacco Use    Smoking status: Former     Current packs/day: 0.25     Average packs/day: 0.3 packs/day for 1 year (0.3 ttl pk-yrs)     Types: Cigarettes    Smokeless tobacco: Former   Vaping Use    Vaping Use: Never used   Substance Use Topics    Alcohol use: Yes     Alcohol/week: 1.8 oz     Types: 1 Glasses of wine, 1 Cans of beer, 1 Shots of liquor per week     Comment: Occ    Drug use: Not Currently     Types: Marijuana       Current Outpatient Medications Ordered in Epic   Medication Sig Dispense Refill    naproxen (NAPROSYN) 500 MG Tab Take 1 Tablet by mouth 2 times a day with meals. 60 Tablet 0    atorvastatin (LIPITOR) 80 MG tablet Take 1 Tablet by mouth every evening. 90 Tablet 3    triamcinolone acetonide (KENALOG) 0.1 % Cream Apply thin layer to affected area twice daily for up to 2 weeks. 30 g 0    losartan (COZAAR) 50 MG Tab Take 1 Tablet by mouth every day. 100 Tablet 3    aspirin EC (ECOTRIN) 81 MG Tablet Delayed Response Take 1 Tablet by mouth every day. 90 Tablet 3    ibuprofen (MOTRIN) 400 MG Tab Take 600 mg by mouth every 6 hours as needed for Mild Pain. 30 Tablet     Vitamin D, Cholecalciferol, 25 MCG (1000 UT) Cap Take 2,000 Units by mouth every day. 60 Capsule 0    predniSONE  (DELTASONE) 20 MG Tab Take 1 tab once daily for 5 days. 5 Tablet 0     No current Epic-ordered facility-administered medications on file.       Allergies:  Diltiazem hcl, Bee venom, Lisinopril, and Other drug    Health Maintenance: Completed    ROS:  Gen: no fevers/chills  Eyes: no changes in vision  ENT: no sore throat, no hearing loss, no bloody nose  Pulm: no sob, no cough  CV: no chest pain, no palpitations  GI: no nausea/vomiting, no diarrhea  : no dysuria  Neuro: no headaches, no numbness/tingling  Heme/Lymph: no easy bruising    Objective:     Exam:  /68 (BP Location: Right arm, Patient Position: Sitting, BP Cuff Size: Adult)   Pulse 61   Temp 37.1 °C (98.7 °F) (Temporal)   Resp 16   Ht 1.829 m (6')   Wt 94 kg (207 lb 3.2 oz)   SpO2 99%   BMI 28.10 kg/m²  Body mass index is 28.1 kg/m².    Gen: Alert and oriented, No apparent distress.  Skin: Warm and dry.  No obvious lesions.  Eyes: Sclera wnl Pupils normal in size  Lungs: Normal effort, CTA bilaterally, no wheezes, rhonchi, or rales  CV: Regular rate and rhythm. No murmurs, rubs, or gallops.  Musculoskeletal: Normal gait. No extremity cyanosis, clubbing, or edema.  On straight leg and palpation of that leg there is no tenderness noted  Neuro: Oriented to person, place and time  Psych: Mood is wnl         Assessment & Plan:     48 y.o. male with the following -     1. Bilateral hydrocele  I did go over his ultrasound of his scrotum would recommend referral to urology patient very agreeable with the plan  - Referral to Urology    2. Primary hypertension  Patient states sometimes he feels slightly lightheaded but his blood pressure today looks good I did offer to have him decrease his blood pressure medication but would need to see him back in 2 weeks patient at this time would like to continue the present dose and will contact me if he feels he is having more issues with dizziness.    3. Elevated LDL cholesterol level  Patient is supposed to get  his lipid panel done I reminded patient of this       Return if symptoms worsen or fail to improve.    Please note that this dictation was created using voice recognition software. I have made every reasonable attempt to correct obvious errors, but I expect that there are errors of grammar and possibly content that I did not discover before finalizing the note.

## 2023-11-03 NOTE — ED NOTES
Pt ambulated to the room with steady gait and without assistance. Call light within reach. No further complaints at this time. Chart up for ERP.

## 2023-11-03 NOTE — ED NOTES
Pt called for room from triage 3x without response. Pt unable to be located in restroom, phlebotomy waiting area, or outside of lobby. Will call again in 10 minutes.

## 2023-11-03 NOTE — ED PROVIDER NOTES
CHIEF COMPLAINT  Chief Complaint   Patient presents with    Testicle Pain     L sided testicle pain x 1-2 wks. Blood in sperm 1 wk ago. Denies hematuria.        LIMITATION TO HISTORY   Select: None    HPI    Jensen Donald is a 48 y.o. male who presents to the Emergency Department evaluation of an achy left testicular pain for the past week.  Patient stated after intercourse approximately 10 days ago he ejaculated and noted some blood in his semen at that time, he then developed an ache in his left testicle which has been present for the past 1 to 2 weeks.  It is a little bit better with Tylenol and aspirin no other alleviating or exacerbating factors.  States he is sexually monogamous and and is not concerned about STDs.  Denies increased urinary frequency urgency.  Denies any testicular trauma or other complaints    OUTSIDE HISTORIAN(S):  Select: None available    EXTERNAL RECORDS REVIEWED  Select: Other patient is noted to be hypertensive in the emergency department review of records reveal that the patient is following with cardiology for hypertension atrial fibrillation coronary artery disease dyslipidemia, blood pressure during that visit 1 was 130/90, he is on losartan for blood pressure control    PAST MEDICAL HISTORY  Past Medical History:   Diagnosis Date    A-fib (HCC)     4 years ago, one episode only, echo/stress test normal    Geographic tongue     Hypertension      .    SURGICAL HISTORY  History reviewed. No pertinent surgical history.      FAMILY HISTORY  Family History   Problem Relation Age of Onset    Diabetes Mother     Lung Disease Son           SOCIAL HISTORY  Social History     Socioeconomic History    Marital status:      Spouse name: Not on file    Number of children: Not on file    Years of education: Not on file    Highest education level: Not on file   Occupational History    Not on file   Tobacco Use    Smoking status: Former     Current packs/day: 0.25     Average  packs/day: 0.3 packs/day for 1 year (0.3 ttl pk-yrs)     Types: Cigarettes    Smokeless tobacco: Former   Vaping Use    Vaping Use: Never used   Substance and Sexual Activity    Alcohol use: Yes     Alcohol/week: 1.8 oz     Types: 1 Glasses of wine, 1 Cans of beer, 1 Shots of liquor per week     Comment: Occ    Drug use: Not Currently     Types: Marijuana    Sexual activity: Yes     Partners: Female     Comment: wife, Fern   Other Topics Concern    Not on file   Social History Narrative    Not on file     Social Determinants of Health     Financial Resource Strain: Not on file   Food Insecurity: Not on file   Transportation Needs: Not on file   Physical Activity: Not on file   Stress: Not on file   Social Connections: Not on file   Intimate Partner Violence: Not on file   Housing Stability: Not on file         CURRENT MEDICATIONS  No current facility-administered medications on file prior to encounter.     Current Outpatient Medications on File Prior to Encounter   Medication Sig Dispense Refill    atorvastatin (LIPITOR) 80 MG tablet Take 1 Tablet by mouth every evening. 90 Tablet 3    sumatriptan (IMITREX) 100 MG tablet       predniSONE (DELTASONE) 20 MG Tab Take 1 tab once daily for 5 days. 5 Tablet 0    triamcinolone acetonide (KENALOG) 0.1 % Cream Apply thin layer to affected area twice daily for up to 2 weeks. 30 g 0    losartan (COZAAR) 50 MG Tab Take 1 Tablet by mouth every day. 100 Tablet 3    aspirin EC (ECOTRIN) 81 MG Tablet Delayed Response Take 1 Tablet by mouth every day. 90 Tablet 3    ibuprofen (MOTRIN) 400 MG Tab Take 600 mg by mouth every 6 hours as needed for Mild Pain. 30 Tablet     Vitamin D, Cholecalciferol, 25 MCG (1000 UT) Cap Take 2,000 Units by mouth every day. 60 Capsule 0           ALLERGIES  Allergies   Allergen Reactions    Diltiazem Hcl      Other reaction(s): Headache    Bee Venom     Lisinopril      cough  Other reaction(s): cough    Other Drug      Can't remember.  Few yrs ago.  Anti-anxiety pill       PHYSICAL EXAM  VITAL SIGNS:BP (!) 164/79   Pulse 61   Temp 36.1 °C (97 °F) (Temporal)   Resp 17   Wt 95.2 kg (209 lb 14.1 oz)   SpO2 98%   BMI 28.46 kg/m²       VITALS - vital signs documented prior to this note have been reviewed and noted,  GENERAL - awake, alert, oriented, GCS 15, no apparent distress, non-toxic  appearing  HEENT - normocephalic, atraumatic, pupils equal, sclera anicteric, mucus  membranes moist  NECK - supple, no meningismus, full active range of motion, trachea midline  CARDIOVASCULAR - regular rate/rhythm, no murmurs/gallops/rubs  PULMONARY - no respiratory distress, speaking in full sentences, clear to  auscultation bilaterally, no wheezing/ronchi/rales, no accessory muscle use  GASTROINTESTINAL - soft, non-tender, non-distended, no rebound, guarding,  or peritonitis  GENITOURINARY -uncircumcised male normal external genitalia pain with palpation of his left epididymis, normal lie normal cremasteric reflex no inguinal lymphadenopathy  NEUROLOGIC - Awake alert, normal mental status, speech fluid, cognition  normal, moves all extremities  MUSCULOSKELETAL - no obvious asymmetry or deformities present  EXTREMITIES - warm, well-perfused, no cyanosis or significant edema  DERMATOLOGIC - warm, dry, no rashes, no jaundice  PSYCHIATRIC - normal affect, normal insight, normal concentration    DIAGNOSTIC STUDIES / PROCEDURES    LABS  Labs Reviewed   URINALYSIS    Narrative:     Release to patient->Immediate   CHLAMYDIA/GC, PCR (URINE)    Narrative:     Release to patient->Immediate       Urinalysis negative for infection GC chlamydia negative  RADIOLOGY  I have independently interpreted the diagnostic imaging associated with this visit and am waiting the final reading from the radiologist.   My preliminary interpretation is as follows: Ultrasound does demonstrate bilateral Doppler flow  Radiologist interpretation:   [id number]   Final Result         1.  Small left  epididymal head cyst.   2.  Small bilateral hydroceles with debris and septation on the left.           COURSE & MEDICAL DECISION MAKING    ED COURSE:    ED Observation Status? Yes;I am placing the patient in to an observation status due to a diagnostic uncertainty as well as therapeutic intensity. Patient placed in observation status at 5:27 PM 11/2/2023    Observation plan is as follows: Labs ultrasound    INTERVENTIONS BY ME:  Medications   acetaminophen (Tylenol) tablet 650 mg (650 mg Oral Given 11/2/23 1736)   ibuprofen (Motrin) tablet 600 mg (600 mg Oral Given 11/2/23 1736)     Reevaluation at 2308 patient is resting comfortably no acute distress, did discuss all results of ultrasound as well as pertinent follow-up.  Response on recheck: Patient reports pain is improved.      Patient discharged from ED observation at 11:08 PM 11/02/23  .      @HTN/IDDM FOLLOW UP:  The patient has known hypertension and is being followed by their primary care doctor@    INITIAL ASSESSMENT, COURSE AND PLAN  Care Narrative:  patient presented for evaluation of the testicular pain.  On examination, he has a normal lie normal cremasteric reflex however does have tenderness with palpation of his epididymis and did report blood in his semen after ejaculation approximately 1 week ago, concerning for possible mild epididymitis.   urinalysis was obtained was negative for signs of infection ultrasound was ordered to exclude underlying testicular torsion, which fortunately did not reveal torsion though did demonstrate a small epididymal head cyst with bilateral hydroceles.  Recommend the patient try NSAIDs for the next 72 hours follow-up with his PCP or urology or return with any other new or worsening symptoms he was discharged in stable condition             ADDITIONAL PROBLEM LIST  Elevated blood pressure reading, patient has known history of hypertension follows with cardiology for this  DISPOSITION AND DISCUSSIONS    Escalation of  care considered, and ultimately not performed:blood analysis    Decision tools and prescription drugs considered including, but not limited to: Pain Medications discussed with the patient the use of oral opioids though after a discussion they did feel comfortable using over-the-counter Tylenol and ibuprofen thus narcotics will be deferred .    FINAL DIAGNOSIS  1. Pain in left testicle    2. Epididymitis    3. Encysted hydrocele             Electronically signed by: Baldo Gonsalves DO ,11:08 PM 11/02/23

## 2023-11-03 NOTE — ED NOTES
Bedside report received from JUDAH Marks  Assumed patient care. Verified patient identification.  Checked on bed, connected to monitor,  with unlabored respirations. Discussed plan of care.   Gurney in low position, side rail up for pt safety. Call light within reach.   No needs identified at the moment. Instructed to use call light when needed.    Contraptions: None   Alert and Oriented: Yes   Ambulatory:Yes   Oxygen: None   Pending: Ultrasound Testicle

## 2023-11-03 NOTE — ED NOTES
Pt notified that it will be at least another hour until the US will be done. Pt appreciative of update.

## 2023-11-06 ENCOUNTER — APPOINTMENT (OUTPATIENT)
Dept: MEDICAL GROUP | Facility: PHYSICIAN GROUP | Age: 48
End: 2023-11-06
Payer: COMMERCIAL

## 2023-12-08 ENCOUNTER — TELEPHONE (OUTPATIENT)
Dept: CARDIOLOGY | Facility: MEDICAL CENTER | Age: 48
End: 2023-12-08
Payer: COMMERCIAL

## 2023-12-08 NOTE — TELEPHONE ENCOUNTER
Last OV: 10/17/2023  Proposed Surgery: epididymis, excisions of spermatocele  Surgery Date: 12/20/2023  Requesting Office Name: [facility] [place]   Fax Number: [phone]  Preference of Location (default is surgery center unless specified by Cardiologist or [facility])  Prior Clearance Addressed: No      Anticoags/Antiplatelets: Aspirin  Outstanding Cardiac Imaging : No  Stent, Cardiac Devices, or Catheterization: No  Ablation, TAVR/Valve (including open heart), Cardioversion: No  Recent Cardiac Hospitalization: Yes  Date:  3/12/2023            When: Greater than 6 months since hospitalization.   History (cardiac history):   Past Medical History:   Diagnosis Date    A-fib (HCC)     4 years ago, one episode only, echo/stress test normal    Geographic tongue     Hypertension              Surgical Clearance Letter Sent: YES   **Scan clearance request letter into [facility].**

## 2023-12-08 NOTE — LETTER
PROCEDURE/SURGERY CLEARANCE FORM      Encounter Date: 12/8/2023    Patient: Jensen Donald  YOB: 1975    CARDIOLOGIST:  Filemon CASTRO M.D.    REFERRING DOCTOR:  No ref. provider found    The above patient is cleared to have the following procedure/surgery:  epididymis, excisions of spermatocele                                            PROCEDURE/SURGERY CLEARANCE FORM    Date: 12/8/2023   Patient Name: Jensen Donald    Dear Surgeon or Proceduralist,      Thank you for your request for cardiac stratification of our mutual patient Jensen Donald 1975. We have reviewed their [facility] records; and to the best of our understanding this patient has not had stenting, ablation, cardiothoracic surgery or hospitalization for cardiovascular reasons in the past 6 months.  Jensen Donald has been seen within the past 18 months and is considered to have non-modifiable cardiac risk for this low-risk procedure/surgery. They may proceed from a cardiovascular standpoint and may hold their antiplatelet/anticoagulation as briefly as possible. Please have patient resume this medication when hemodynamically stable to do so.     Aspirin or Prasugrel   - hold 7 days prior to procedure/surgery, resume when hemodynamically stable      Clopidrogrel or Ticagrelor  - hold 7 days for all neurological procedures, hold 5 days prior to all other procedure/surgery,  resume when hemodynamically stable     Warfarin - hold 7 days for all neurological procedures, hold 5 days prior to all other procedure/surgery and coordinate with [facility] ([phone]) INR testing and dose management.      Pradaxa/Xarelto/Eliquis/Savesya - hold 1 day prior to procedure for low bleeding risk procedure, 2 days for high bleeding risk procedure, or consider holding 3 days or longer for patients with reduced kidney function (CrCl <30mL/min) or spinal/cranial surgeries/procedures.      If they  have a mechanical heart valve, please coordinate with [facility] ([phone]) the proper management of their anticoagulant in the periprocedural or perioperative period.      Some patients have higher risk for cardiovascular complications or holding medication. If our patient has had prior complications of holding antiplatelet or anticoagulants in the past and we have seen them after these events, we have addressed these concerns with the patient. They are at an unknown degree of increased risk for recurrent complication.  You may hold anticoagulation/antiplatelets for the procedure or surgery if the benefits of the procedure or surgery outweigh this nonmodifiable risk.      If Jensen Donald 1975 has new symptoms of heart failure decompensation, unstable arrythmia, or angina please reach out and we will assess the patient.      If you have other patient-specific concerns, please feel free to reach out to the patient's cardiologist directly at [phone].     Thank you,       [facility]          Electronically signed         MD Signature   Filemon CASTRO M.D.

## 2023-12-15 ENCOUNTER — HOSPITAL ENCOUNTER (OUTPATIENT)
Dept: LAB | Facility: MEDICAL CENTER | Age: 48
End: 2023-12-15
Attending: INTERNAL MEDICINE
Payer: COMMERCIAL

## 2023-12-15 ENCOUNTER — HOSPITAL ENCOUNTER (OUTPATIENT)
Dept: LAB | Facility: MEDICAL CENTER | Age: 48
End: 2023-12-15
Attending: STUDENT IN AN ORGANIZED HEALTH CARE EDUCATION/TRAINING PROGRAM
Payer: COMMERCIAL

## 2023-12-15 DIAGNOSIS — I25.10 CORONARY ARTERY DISEASE INVOLVING NATIVE CORONARY ARTERY OF NATIVE HEART WITHOUT ANGINA PECTORIS: ICD-10-CM

## 2023-12-15 DIAGNOSIS — E78.5 DYSLIPIDEMIA: ICD-10-CM

## 2023-12-15 LAB
ANION GAP SERPL CALC-SCNC: 9 MMOL/L (ref 7–16)
APPEARANCE UR: CLEAR
APTT PPP: 34.1 SEC (ref 24.7–36)
BILIRUB UR QL STRIP.AUTO: NEGATIVE
BUN SERPL-MCNC: 26 MG/DL (ref 8–22)
CALCIUM SERPL-MCNC: 9.1 MG/DL (ref 8.5–10.5)
CHLORIDE SERPL-SCNC: 104 MMOL/L (ref 96–112)
CHOLEST SERPL-MCNC: 132 MG/DL (ref 100–199)
CO2 SERPL-SCNC: 26 MMOL/L (ref 20–33)
COLOR UR: YELLOW
CREAT SERPL-MCNC: 1.2 MG/DL (ref 0.5–1.4)
FASTING STATUS PATIENT QL REPORTED: NORMAL
GFR SERPLBLD CREATININE-BSD FMLA CKD-EPI: 74 ML/MIN/1.73 M 2
GLUCOSE SERPL-MCNC: 94 MG/DL (ref 65–99)
GLUCOSE UR STRIP.AUTO-MCNC: NEGATIVE MG/DL
HDLC SERPL-MCNC: 63 MG/DL
INR PPP: 0.97 (ref 0.87–1.13)
KETONES UR STRIP.AUTO-MCNC: NEGATIVE MG/DL
LDLC SERPL CALC-MCNC: 58 MG/DL
LEUKOCYTE ESTERASE UR QL STRIP.AUTO: NEGATIVE
MICRO URNS: NORMAL
NITRITE UR QL STRIP.AUTO: NEGATIVE
PH UR STRIP.AUTO: 6 [PH] (ref 5–8)
POTASSIUM SERPL-SCNC: 4.7 MMOL/L (ref 3.6–5.5)
PROT UR QL STRIP: NEGATIVE MG/DL
PROTHROMBIN TIME: 13 SEC (ref 12–14.6)
RBC UR QL AUTO: NEGATIVE
SODIUM SERPL-SCNC: 139 MMOL/L (ref 135–145)
SP GR UR STRIP.AUTO: 1.02
TRIGL SERPL-MCNC: 57 MG/DL (ref 0–149)
UROBILINOGEN UR STRIP.AUTO-MCNC: 0.2 MG/DL

## 2023-12-15 PROCEDURE — 85610 PROTHROMBIN TIME: CPT

## 2023-12-15 PROCEDURE — 85730 THROMBOPLASTIN TIME PARTIAL: CPT

## 2023-12-15 PROCEDURE — 36415 COLL VENOUS BLD VENIPUNCTURE: CPT

## 2023-12-15 PROCEDURE — 85025 COMPLETE CBC W/AUTO DIFF WBC: CPT

## 2023-12-15 PROCEDURE — 80048 BASIC METABOLIC PNL TOTAL CA: CPT

## 2023-12-15 PROCEDURE — 80061 LIPID PANEL: CPT

## 2023-12-15 PROCEDURE — 87086 URINE CULTURE/COLONY COUNT: CPT

## 2023-12-15 PROCEDURE — 81003 URINALYSIS AUTO W/O SCOPE: CPT

## 2023-12-16 LAB
BASOPHILS # BLD AUTO: 1.1 % (ref 0–1.8)
BASOPHILS # BLD: 0.04 K/UL (ref 0–0.12)
EOSINOPHIL # BLD AUTO: 0.11 K/UL (ref 0–0.51)
EOSINOPHIL NFR BLD: 3.1 % (ref 0–6.9)
ERYTHROCYTE [DISTWIDTH] IN BLOOD BY AUTOMATED COUNT: 38.5 FL (ref 35.9–50)
HCT VFR BLD AUTO: 43.1 % (ref 42–52)
HGB BLD-MCNC: 14.6 G/DL (ref 14–18)
IMM GRANULOCYTES # BLD AUTO: 0.01 K/UL (ref 0–0.11)
IMM GRANULOCYTES NFR BLD AUTO: 0.3 % (ref 0–0.9)
LYMPHOCYTES # BLD AUTO: 1.41 K/UL (ref 1–4.8)
LYMPHOCYTES NFR BLD: 39.6 % (ref 22–41)
MCH RBC QN AUTO: 29.8 PG (ref 27–33)
MCHC RBC AUTO-ENTMCNC: 33.9 G/DL (ref 32.3–36.5)
MCV RBC AUTO: 88 FL (ref 81.4–97.8)
MONOCYTES # BLD AUTO: 0.25 K/UL (ref 0–0.85)
MONOCYTES NFR BLD AUTO: 7 % (ref 0–13.4)
NEUTROPHILS # BLD AUTO: 1.74 K/UL (ref 1.82–7.42)
NEUTROPHILS NFR BLD: 48.9 % (ref 44–72)
NRBC # BLD AUTO: 0 K/UL
NRBC BLD-RTO: 0 /100 WBC (ref 0–0.2)
PLATELET # BLD AUTO: 200 K/UL (ref 164–446)
PMV BLD AUTO: 11.7 FL (ref 9–12.9)
RBC # BLD AUTO: 4.9 M/UL (ref 4.7–6.1)
WBC # BLD AUTO: 3.6 K/UL (ref 4.8–10.8)

## 2023-12-18 LAB
BACTERIA UR CULT: NORMAL
SIGNIFICANT IND 70042: NORMAL
SITE SITE: NORMAL
SOURCE SOURCE: NORMAL

## 2024-01-30 ENCOUNTER — OFFICE VISIT (OUTPATIENT)
Dept: URGENT CARE | Facility: PHYSICIAN GROUP | Age: 49
End: 2024-01-30
Payer: COMMERCIAL

## 2024-01-30 VITALS
BODY MASS INDEX: 28.71 KG/M2 | HEIGHT: 72 IN | HEART RATE: 55 BPM | DIASTOLIC BLOOD PRESSURE: 80 MMHG | RESPIRATION RATE: 16 BRPM | OXYGEN SATURATION: 100 % | SYSTOLIC BLOOD PRESSURE: 118 MMHG | TEMPERATURE: 98.3 F | WEIGHT: 212 LBS

## 2024-01-30 DIAGNOSIS — H10.32 ACUTE CONJUNCTIVITIS OF LEFT EYE, UNSPECIFIED ACUTE CONJUNCTIVITIS TYPE: ICD-10-CM

## 2024-01-30 DIAGNOSIS — Z77.098 CHEMICAL EXPOSURE OF EYE: ICD-10-CM

## 2024-01-30 DIAGNOSIS — H57.12 PAIN AROUND LEFT EYE: ICD-10-CM

## 2024-01-30 PROCEDURE — 3079F DIAST BP 80-89 MM HG: CPT | Performed by: NURSE PRACTITIONER

## 2024-01-30 PROCEDURE — 3074F SYST BP LT 130 MM HG: CPT | Performed by: NURSE PRACTITIONER

## 2024-01-30 PROCEDURE — 99213 OFFICE O/P EST LOW 20 MIN: CPT | Performed by: NURSE PRACTITIONER

## 2024-01-30 RX ORDER — POLYMYXIN B SULFATE AND TRIMETHOPRIM 1; 10000 MG/ML; [USP'U]/ML
1 SOLUTION OPHTHALMIC EVERY 4 HOURS
Qty: 10 ML | Refills: 0 | Status: SHIPPED | OUTPATIENT
Start: 2024-01-30 | End: 2024-02-04

## 2024-01-30 ASSESSMENT — FIBROSIS 4 INDEX: FIB4 SCORE: 0.53

## 2024-01-31 NOTE — PROGRESS NOTES
Jensen Donald is a 48 y.o. male who presents for Eye Problem (Sprayed in the left eye with pain gun.  Today about 1 pm ) and Congestion      HPI  This is a new problem. Jensen Donald is a 48 y.o. patient who presents to urgent care with c/o: he was using a paint gun today. Some of the paint got into his eye. He rinsed his eye out with water. Now eye feels irritated and congested.     ROS See HPI    Allergies:       Allergies   Allergen Reactions    Diltiazem Hcl      Other reaction(s): Headache    Bee Venom     Lisinopril      cough  Other reaction(s): cough    Other Drug      Can't remember.  Few yrs ago. Anti-anxiety pill       PMSFS Hx:  Past Medical History:   Diagnosis Date    A-fib (HCC)     4 years ago, one episode only, echo/stress test normal    Geographic tongue     Hypertension      No past surgical history on file.  Family History   Problem Relation Age of Onset    Diabetes Mother     Lung Disease Son      Social History     Tobacco Use    Smoking status: Former     Current packs/day: 0.25     Average packs/day: 0.3 packs/day for 1 year (0.3 ttl pk-yrs)     Types: Cigarettes    Smokeless tobacco: Former   Substance Use Topics    Alcohol use: Yes     Alcohol/week: 1.8 oz     Types: 1 Glasses of wine, 1 Cans of beer, 1 Shots of liquor per week     Comment: Occ       Problems:   Patient Active Problem List   Diagnosis    Mixed hyperlipidemia    Cyst of joint of ankle or foot    HTN (hypertension)    abnormal electrocardiogram (ECG) (EKG)    Pain in both knees    Chronic left shoulder pain    Gastroesophageal reflux disease    Obesity (BMI 30-39.9)    Acute right-sided low back pain without sciatica    Dysfunction of both eustachian tubes    Soft tissue mass    Anxiety state    Migraine with aura and without status migrainosus, not intractable    Weight loss    Blurred vision, bilateral    Vertigo    Palpitation    Hyperkalemia    Chest wall discomfort    Elevated LDL cholesterol level     Leukopenia    Palpitations    Coronary artery disease involving native coronary artery of native heart without angina pectoris    Acute rhinitis    PAF (paroxysmal atrial fibrillation) ([facility])       Medications:   Current Outpatient Medications on File Prior to Visit   Medication Sig Dispense Refill    atorvastatin (LIPITOR) 80 MG tablet Take 1 Tablet by mouth every evening. 90 Tablet 3    losartan (COZAAR) 50 MG Tab Take 1 Tablet by mouth every day. 100 Tablet 3    aspirin EC (ECOTRIN) 81 MG Tablet Delayed Response Take 1 Tablet by mouth every day. 90 Tablet 3    naproxen (NAPROSYN) 500 MG Tab Take 1 Tablet by mouth 2 times a day with meals. (Patient not taking: Reported on 1/30/2024) 60 Tablet 0    predniSONE (DELTASONE) 20 MG Tab Take 1 tab once daily for 5 days. (Patient not taking: Reported on 1/30/2024) 5 Tablet 0    triamcinolone acetonide (KENALOG) 0.1 % Cream Apply thin layer to affected area twice daily for up to 2 weeks. (Patient not taking: Reported on 1/30/2024) 30 g 0    ibuprofen (MOTRIN) 400 MG Tab Take 600 mg by mouth every 6 hours as needed for Mild Pain. (Patient not taking: Reported on 1/30/2024) 30 Tablet     Vitamin D, Cholecalciferol, 25 MCG (1000 UT) Cap Take 2,000 Units by mouth every day. (Patient not taking: Reported on 1/30/2024) 60 Capsule 0     No current facility-administered medications on file prior to visit.        Objective:     /80   Pulse (!) 55   Temp 36.8 °C (98.3 °F) (Temporal)   Resp 16   Ht 1.829 m (6')   Wt 96.2 kg (212 lb)   SpO2 100%   BMI 28.75 kg/m²     Physical Exam  Vitals and nursing note reviewed.   Constitutional:       General: He is not in acute distress.     Appearance: He is well-developed. He is not toxic-appearing.   Eyes:      General: Lids are normal. Lids are everted, no foreign bodies appreciated.         Left eye: Discharge (watery) present.     Conjunctiva/sclera:      Left eye: Left conjunctiva is injected.      Pupils: Pupils are equal,  round, and reactive to light.      Left eye: No corneal abrasion or fluorescein uptake.      Slit lamp exam:     Left eye: No foreign body.      Comments: Eye rinses copiously with normal saline.    Cardiovascular:      Rate and Rhythm: Normal rate and regular rhythm.   Pulmonary:      Effort: Pulmonary effort is normal.   Skin:     General: Skin is warm and dry.   Neurological:      Mental Status: He is alert.   Psychiatric:         Speech: Speech normal.         Behavior: Behavior normal. Behavior is cooperative.     Visual acuity:   OD 20/25  OS20/25  OD: 20/20    Assessment /Associated Orders:      1. Acute conjunctivitis of left eye, unspecified acute conjunctivitis type  polymixin-trimethoprim (POLYTRIM) 52382-9.1 UNIT/ML-% Solution      2. Pain around left eye        3. Chemical exposure of eye              Medical Decision Making:    Jensen is a very pleasant 48 y.o. male who is clinically stable at today's acute urgent care visit.  No acute distress noted.  VSS. Appropriate for outpatient care at this time.   Acute problem today with uncertain prognosis. Overall eye appears inflammed. No abrasion. No FB   Educated in proper administration of  prescription medication(s) ordered today including safety, possible SE, risks, benefits, rationale and alternatives to therapy.   Cool compress prn pain   Warm compress BID for 10 min for 2-3 days  Keep well hydrated.     Discussed Dx, management options (risks,benefits, and alternatives to planned treatment), natural progression and supportive care.  Expressed understanding and the treatment plan was agreed upon.   Questions were encouraged and answered   Return to urgent care prn if new or worsening sx or if there is no improvement in condition prn.    Educated in Red flags and indications to immediately call 911 or present to the Emergency Department.       Please note that this dictation was created using voice recognition software. I have worked with consultants  from the vendor as well as technical experts from [employer] [facility] to optimize the interface. I have made every reasonable attempt to correct obvious errors, but I expect that there are errors of grammar and possibly content that I did not discover before finalizing the note.  This note was electronically signed by provider

## 2024-02-26 ENCOUNTER — HOSPITAL ENCOUNTER (OUTPATIENT)
Dept: RADIOLOGY | Facility: MEDICAL CENTER | Age: 49
End: 2024-02-26
Payer: COMMERCIAL

## 2024-02-26 DIAGNOSIS — M53.3 DISORDER OF SACRUM: ICD-10-CM

## 2024-02-26 PROCEDURE — 72220 X-RAY EXAM SACRUM TAILBONE: CPT

## 2024-02-26 PROCEDURE — 72170 X-RAY EXAM OF PELVIS: CPT

## 2024-02-28 DIAGNOSIS — I25.10 CORONARY ARTERY DISEASE INVOLVING NATIVE CORONARY ARTERY OF NATIVE HEART WITHOUT ANGINA PECTORIS: ICD-10-CM

## 2024-02-29 RX ORDER — ASPIRIN 81 MG/1
81 TABLET, COATED ORAL
Qty: 90 TABLET | Refills: 2 | Status: SHIPPED | OUTPATIENT
Start: 2024-02-29

## 2024-02-29 NOTE — TELEPHONE ENCOUNTER
Is the patient due for a refill? Yes    Was the patient seen the past year? Yes    Date of last office visit: 10/17/2023    Does the patient have an upcoming appointment?  No   If yes, When?     Provider to refill:VIDHYA    Does the patients insurance require a 100 day supply?  No

## 2024-03-29 ENCOUNTER — HOSPITAL ENCOUNTER (OUTPATIENT)
Dept: RADIOLOGY | Facility: MEDICAL CENTER | Age: 49
End: 2024-03-29
Payer: COMMERCIAL

## 2024-03-29 DIAGNOSIS — M46.1 SACROILIITIS, NOT ELSEWHERE CLASSIFIED (HCC): ICD-10-CM

## 2024-03-29 PROCEDURE — 72195 MRI PELVIS W/O DYE: CPT

## 2024-05-30 DIAGNOSIS — I10 ESSENTIAL HYPERTENSION: ICD-10-CM

## 2024-05-30 RX ORDER — LOSARTAN POTASSIUM 50 MG/1
50 TABLET ORAL DAILY
Qty: 100 TABLET | Refills: 0 | Status: SHIPPED | OUTPATIENT
Start: 2024-05-30

## 2024-05-30 NOTE — TELEPHONE ENCOUNTER
Is the patient due for a refill? Yes    Was the patient seen the past year? Yes    Date of last office visit: 10/17/2023    Does the patient have an upcoming appointment?  No    Provider to refill: VIDHYA    Does the patients insurance require a 100 day supply?  No

## 2024-06-22 ENCOUNTER — OFFICE VISIT (OUTPATIENT)
Dept: URGENT CARE | Facility: PHYSICIAN GROUP | Age: 49
End: 2024-06-22
Payer: COMMERCIAL

## 2024-06-22 VITALS
TEMPERATURE: 98.6 F | WEIGHT: 214 LBS | DIASTOLIC BLOOD PRESSURE: 82 MMHG | HEIGHT: 72 IN | OXYGEN SATURATION: 98 % | HEART RATE: 60 BPM | BODY MASS INDEX: 28.99 KG/M2 | RESPIRATION RATE: 14 BRPM | SYSTOLIC BLOOD PRESSURE: 136 MMHG

## 2024-06-22 DIAGNOSIS — J03.80 ACUTE BACTERIAL TONSILLITIS: ICD-10-CM

## 2024-06-22 DIAGNOSIS — B96.89 ACUTE BACTERIAL TONSILLITIS: ICD-10-CM

## 2024-06-22 PROCEDURE — 3075F SYST BP GE 130 - 139MM HG: CPT | Performed by: NURSE PRACTITIONER

## 2024-06-22 PROCEDURE — 3079F DIAST BP 80-89 MM HG: CPT | Performed by: NURSE PRACTITIONER

## 2024-06-22 PROCEDURE — 99213 OFFICE O/P EST LOW 20 MIN: CPT | Performed by: NURSE PRACTITIONER

## 2024-06-22 RX ORDER — AMOXICILLIN AND CLAVULANATE POTASSIUM 875; 125 MG/1; MG/1
1 TABLET, FILM COATED ORAL 2 TIMES DAILY
Qty: 14 TABLET | Refills: 0 | Status: SHIPPED | OUTPATIENT
Start: 2024-06-22 | End: 2024-06-29

## 2024-06-22 RX ORDER — METHYLPREDNISOLONE 4 MG/1
TABLET ORAL
Qty: 21 TABLET | Refills: 0 | Status: SHIPPED | OUTPATIENT
Start: 2024-06-22

## 2024-06-22 ASSESSMENT — FIBROSIS 4 INDEX: FIB4 SCORE: 0.53

## 2024-06-22 ASSESSMENT — PAIN SCALES - GENERAL: PAINLEVEL: NO PAIN

## 2024-06-23 NOTE — PROGRESS NOTES
Verbal consent was acquired by the patient to use [employer] ambient listening note generation during this visit.    Subjective:     HPI:   History of Present Illness  The patient is a 48-year-old male who presents for evaluation of sore throat.    The patient reports a severe sore throat, likening the sensation to a swallowing a nail, which commenced last Friday and has progressively worsened. He attempted to alleviate the discomfort by gargling with hot water, which provided temporary relief. However, his wife observed the presence of white spots in his throat. Concurrently, he developed significant congestion and a sensation of tightness in his chest, which intensifies when he coughs. He also reports dry mouth, itchiness, and scratchy throat. He has experienced cold chills, mild fever, body aches, and chills, but denies any recent exposure to sick individuals. He recently returned from a vacation to [place]. He tested negative for COVID-19 a few days ago.     He denies any allergies to antibiotics.      Objective:     Exam:  /82   Pulse 60   Temp 37 °C (98.6 °F) (Temporal)   Resp 14   Ht 1.829 m (6')   Wt 97.1 kg (214 lb)   SpO2 98%   BMI 29.02 kg/m²  Body mass index is 29.02 kg/m².    Physical Exam  Vitals and nursing note reviewed.   Constitutional:       General: He is not in acute distress.     Appearance: Normal appearance. He is normal weight. He is ill-appearing.   HENT:      Head: Normocephalic and atraumatic.      Right Ear: Tympanic membrane, ear canal and external ear normal.      Left Ear: Tympanic membrane, ear canal and external ear normal.      Nose: Congestion present.      Mouth/Throat:      Mouth: Mucous membranes are moist.      Pharynx: Uvula midline. Pharyngeal swelling and posterior oropharyngeal erythema present. No oropharyngeal exudate or uvula swelling.      Tonsils: Tonsillar exudate present. 1+ on the right. 1+ on the left.   Eyes:      Extraocular Movements:  Extraocular movements intact.      Conjunctiva/sclera: Conjunctivae normal.      Pupils: Pupils are equal, round, and reactive to light.   Cardiovascular:      Rate and Rhythm: Normal rate and regular rhythm.      Pulses: Normal pulses.      Heart sounds: Normal heart sounds.   Pulmonary:      Effort: Pulmonary effort is normal.      Breath sounds: Normal breath sounds.   Musculoskeletal:         General: Normal range of motion.      Cervical back: Normal range of motion and neck supple. Tenderness present.   Lymphadenopathy:      Cervical: Cervical adenopathy present.   Skin:     General: Skin is warm and dry.      Capillary Refill: Capillary refill takes less than 2 seconds.   Neurological:      General: No focal deficit present.      Mental Status: He is alert and oriented to person, place, and time.           Assessment & Plan:     1. Acute bacterial tonsillitis  amoxicillin-clavulanate (AUGMENTIN) 875-125 MG Tab    methylPREDNISolone (MEDROL DOSEPAK) 4 MG Tablet Therapy Pack          Assessment & Plan  1. Acute bacterial tonsillitis.  The prescribed treatment plan includes the administration of Augmentin and a steroid pack to alleviate throat inflammation. Continue salt water gargles and tea with honey.  Cepacol throat lozenges were recommended.  Patient will return to clinic for reevaluation if he is not improving in 72 hours, sooner if worse.             JESS Guerra.P.R.N.      Please note that this dictation was created using voice recognition software. I have made every reasonable attempt to correct obvious errors, but I expect that there are errors of grammar and possibly content that I did not discover before finalizing the note.

## 2024-08-08 ENCOUNTER — OFFICE VISIT (OUTPATIENT)
Dept: CARDIOLOGY | Facility: MEDICAL CENTER | Age: 49
End: 2024-08-08
Attending: INTERNAL MEDICINE
Payer: COMMERCIAL

## 2024-08-08 VITALS
WEIGHT: 221 LBS | HEIGHT: 72 IN | BODY MASS INDEX: 29.93 KG/M2 | DIASTOLIC BLOOD PRESSURE: 68 MMHG | RESPIRATION RATE: 16 BRPM | SYSTOLIC BLOOD PRESSURE: 112 MMHG | HEART RATE: 52 BPM | OXYGEN SATURATION: 99 %

## 2024-08-08 DIAGNOSIS — I48.0 PAROXYSMAL ATRIAL FIBRILLATION (HCC): ICD-10-CM

## 2024-08-08 DIAGNOSIS — E78.2 MIXED HYPERLIPIDEMIA: ICD-10-CM

## 2024-08-08 DIAGNOSIS — I25.10 CORONARY ARTERY DISEASE INVOLVING NATIVE CORONARY ARTERY OF NATIVE HEART WITHOUT ANGINA PECTORIS: ICD-10-CM

## 2024-08-08 DIAGNOSIS — I10 ESSENTIAL HYPERTENSION: ICD-10-CM

## 2024-08-08 LAB — EKG IMPRESSION: NORMAL

## 2024-08-08 PROCEDURE — 3078F DIAST BP <80 MM HG: CPT | Performed by: INTERNAL MEDICINE

## 2024-08-08 PROCEDURE — G2211 COMPLEX E/M VISIT ADD ON: HCPCS | Performed by: INTERNAL MEDICINE

## 2024-08-08 PROCEDURE — 93005 ELECTROCARDIOGRAM TRACING: CPT | Performed by: INTERNAL MEDICINE

## 2024-08-08 PROCEDURE — 99214 OFFICE O/P EST MOD 30 MIN: CPT | Performed by: INTERNAL MEDICINE

## 2024-08-08 PROCEDURE — 99211 OFF/OP EST MAY X REQ PHY/QHP: CPT | Performed by: INTERNAL MEDICINE

## 2024-08-08 PROCEDURE — 3074F SYST BP LT 130 MM HG: CPT | Performed by: INTERNAL MEDICINE

## 2024-08-08 PROCEDURE — 93010 ELECTROCARDIOGRAM REPORT: CPT | Performed by: INTERNAL MEDICINE

## 2024-08-08 RX ORDER — ATORVASTATIN CALCIUM 80 MG/1
80 TABLET, FILM COATED ORAL EVERY EVENING
Qty: 90 TABLET | Refills: 3 | Status: SHIPPED | OUTPATIENT
Start: 2024-08-08

## 2024-08-08 RX ORDER — LOSARTAN POTASSIUM 50 MG/1
50 TABLET ORAL DAILY
Qty: 100 TABLET | Refills: 3 | Status: SHIPPED | OUTPATIENT
Start: 2024-08-08

## 2024-08-08 RX ORDER — ASPIRIN 81 MG/1
81 TABLET ORAL
Qty: 90 TABLET | Refills: 3 | Status: SHIPPED | OUTPATIENT
Start: 2024-08-08

## 2024-08-08 ASSESSMENT — FIBROSIS 4 INDEX: FIB4 SCORE: 0.54

## 2024-08-08 NOTE — PROGRESS NOTES
Cardiology Follow Up Consultation Note    Date of note:    8/20/2024  Primary Care Provider: Rosalee Vasquez M.D.  Referring Provider: No ref. provider found    Patient Name: Jensen Donald   YOB: 1975  MRN:              [id number]    Chief Complaint   Patient presents with    Follow-Up     F/v Dx: Coronary artery disease involving native coronary artery of native heart without angina pectoris    Atrial Fibrillation     PAF (paroxysmal atrial fibrillation) (HCC)      Hyperlipidemia     Mixed hyperlipidemia       History of Present Illness: Mr. Jensen Donald is a 49-year-old man with past medical history significant for remote history of atrial fibrillation, hypertension, CAD, dyslipidemia who presents to the cardiology office for follow-up.    Patient was last seen in the office back in December 2023.  During that visit he was stable.  Was doing well with atorvastatin therapy given his known history of CAD and hyperlipidemia.    Interval history:  Today, he has no major cardiac complaints.  Has not had any exertional chest pain or dyspnea.  He exercises 3-4 times a week which includes interval training and running.  No exertional symptoms.  Takes atorvastatin 80 mg daily without myalgias or muscle related side effects.      Cardiovascular Risk Factors:  1. Smoking status: Denies  2. Type II Diabetes Mellitus: Denies  Lab Results   Component Value Date/Time    HBA1C 5.2 11/12/2014 08:05 AM    HBA1C 5.0 11/15/2013 06:55 AM     3. Hypertension: Yes  4. Dyslipidemia: Yes  Cholesterol,Tot   Date Value Ref Range Status   03/12/2023 132 100 - 199 mg/dL Final     LDL   Date Value Ref Range Status   03/12/2023 63 <100 mg/dL Final     HDL   Date Value Ref Range Status   03/12/2023 53 >=40 mg/dL Final     Triglycerides   Date Value Ref Range Status   03/12/2023 82 0 - 149 mg/dL Final     5. Family history of early Coronary Artery Disease in a first degree relative (Male less than 55 years of  age; Female less than 65 years of age): Denies      Review of Systems:  As per HPI.  Review of systems assessed and are negative except as stated above.      Past Medical History:   Diagnosis Date    A-fib (HCC)     4 years ago, one episode only, echo/stress test normal    Geographic tongue     Hypertension          History reviewed. No pertinent surgical history.      Current Outpatient Medications   Medication Sig Dispense Refill    aspirin (ASPIRIN LOW DOSE) 81 MG EC tablet Take 1 Tablet by mouth every day. 90 Tablet 3    atorvastatin (LIPITOR) 80 MG tablet Take 1 Tablet by mouth every evening. 90 Tablet 3    losartan (COZAAR) 50 MG Tab Take 1 Tablet by mouth every day. 100 Tablet 3     No current facility-administered medications for this visit.         Allergies   Allergen Reactions    Diltiazem Hcl      Other reaction(s): Headache    Bee Venom     Lisinopril      cough  Other reaction(s): cough    Other Drug      Can't remember.  Few yrs ago. Anti-anxiety pill         Family History   Problem Relation Age of Onset    Diabetes Mother     Lung Disease Son          Social History     Socioeconomic History    Marital status:      Spouse name: Not on file    Number of children: Not on file    Years of education: Not on file    Highest education level: Not on file   Occupational History    Not on file   Tobacco Use    Smoking status: Former     Current packs/day: 0.25     Average packs/day: 0.3 packs/day for 1 year (0.3 ttl pk-yrs)     Types: Cigarettes    Smokeless tobacco: Former   Vaping Use    Vaping status: Never Used   Substance and Sexual Activity    Alcohol use: Yes     Alcohol/week: 1.8 oz     Types: 1 Glasses of wine, 1 Cans of beer, 1 Shots of liquor per week     Comment: Occ    Drug use: Not Currently     Types: Marijuana    Sexual activity: Yes     Partners: Female     Comment: wife, Fern   Other Topics Concern    Not on file   Social History Narrative    Not on file     Social Determinants of  Health     Financial Resource Strain: Not on file   Food Insecurity: Not on file   Transportation Needs: Not on file   Physical Activity: Not on file   Stress: Not on file   Social Connections: Not on file   Intimate Partner Violence: Not on file   Housing Stability: Not on file         Physical Exam:  Ambulatory Vitals  /68 (BP Location: Left arm, Patient Position: Sitting, BP Cuff Size: Adult)   Pulse (!) 52   Resp 16   Ht 1.829 m (6')   Wt 100 kg (221 lb)   SpO2 99%    Oxygen Therapy:  Pulse Oximetry: 99 %  BP Readings from Last 4 Encounters:   08/08/24 112/68   06/22/24 136/82   01/30/24 118/80   11/03/23 116/68       Weight/BMI: Body mass index is 29.97 kg/m².  Wt Readings from Last 4 Encounters:   08/08/24 100 kg (221 lb)   06/22/24 97.1 kg (214 lb)   01/30/24 96.2 kg (212 lb)   11/03/23 94 kg (207 lb 3.2 oz)         General: Not in acute distress, appears comfortable  HEENT: OP clear   Neck:  No carotid bruits, No JVD appreciated  CVS:  RRR, Normal S1, S2. No murmurs, rubs or gallops  Resp: Normal respiratory effort, lungs CTA bilaterally. No rales or rhonchi  Abdomen: Soft, non-distended, non-tender to palpation  Neurological: Alert and oriented x3, moves all extremities, no focal neurologic deficits  Psychiatric: Appropriate affect  Extremities:   Extremities warm, no edema      Lab Data Review:  Lab Results   Component Value Date/Time    CHOLSTRLTOT 132 12/15/2023 09:18 AM    LDL 58 12/15/2023 09:18 AM    HDL 63 12/15/2023 09:18 AM    TRIGLYCERIDE 57 12/15/2023 09:18 AM       Lab Results   Component Value Date/Time    SODIUM 139 12/15/2023 09:10 AM    POTASSIUM 4.7 12/15/2023 09:10 AM    CHLORIDE 104 12/15/2023 09:10 AM    CO2 26 12/15/2023 09:10 AM    GLUCOSE 94 12/15/2023 09:10 AM    BUN 26 (H) 12/15/2023 09:10 AM    CREATININE 1.20 12/15/2023 09:10 AM     Lab Results   Component Value Date/Time    ALKPHOSPHAT 68 03/12/2023 10:00 AM    ASTSGOT 12 03/12/2023 10:00 AM    ALTSGPT 30 03/12/2023  10:00 AM    TBILIRUBIN 1.1 03/12/2023 10:00 AM      Lab Results   Component Value Date/Time    WBC 3.6 (L) 12/15/2023 09:10 AM    HEMOGLOBIN 14.6 12/15/2023 09:10 AM     Lab Results   Component Value Date/Time    HBA1C 5.2 11/12/2014 08:05 AM    HBA1C 5.0 11/15/2013 06:55 AM         Cardiac Imaging and Procedures Review:    EKG dated 03/2023:   Sinus rhythm, LVH, nonspecific ST-T changes    Echo 3/2023:  Compared to the prior study on 3-30-22, no changes  The left ventricular ejection fraction is visually estimated to be 60%.  Normal regional wall motion.  Estimated right ventricular systolic pressure is 25 mmHg.    Coronary CTA 1/2023:   Coronary calcification:  LMA - 0.0  LCX - 131.6  LAD - 44.3  RCA - 41.5     Total Calcium Score: 217.4     Coronary CTA:  Dominance: Right dominant circulation.     Left Main: Large caliber vessel with a normal takeoff from the left coronary cusp that bifurcates to form a left anterior descending artery and a left circumflex artery. No plaque or stenosis.     LAD and Diagonals: Patent. Multifocal, predominantly calcific plaque in the proximal and mid LAD results in less than 50% stenosis. No ramus intermedius.     LCX and Obtuse Marginals: Patent. Soft plaque in the proximal LCx results in less than 50% stenosis. Mixed plaque in the mid LCx results in approximately 50% stenosis.     RCA, Posterior Descending and Posterolateral Branches: Patent. Minimal calcific plaque in the mid RCA results in no hemodynamically significant stenosis.    1.  Coronary calcium score of 217.4.  2.  Triple vessel coronary artery disease, with mixed plaque in the mid LCx resulting in approximately 50% stenosis.  3.  Mild, predominantly calcific plaque in the LAD and RCA results in no hemodynamically significant stenosis.    Cardiac Monitor 04/2023:  Event Monitor Summary:  Time Monitored 14 days     1. Sinus rhythm with heart rate range from 38 to 145 bpm. Average heart rate 64 bpm.   2. Normal sinus  node and AV node conduction normal. No pauses.   3. Rare PACs.   4. Rare PVCs.   5. No sustained rhythm changes. No atrial fibrillation/flutter.   6. Patient triggers were associated with sinus rhythm.     Cardiac Monitor 04/2022:  Event Monitor Summary:  Time Monitored 14 days     1. Sinus rhythm with heart rate range from 36 to 142 bpm. Average heart rate 61 bpm.   2. Normal sinus node and AV node conduction normal. No pauses.   3. Rare PACs.   4. Rare PVCs.   5. No sustained rhythm changes. No atrial fibrillation/flutter.   6. Patient triggers were associated with sinus rhythm with heart rate range of 59-95 bpm.       Assessment & Plan     1. Coronary artery disease involving native coronary artery of native heart without angina pectoris  aspirin (ASPIRIN LOW DOSE) 81 MG EC tablet    atorvastatin (LIPITOR) 80 MG tablet    Lipid Profile      2. Mixed hyperlipidemia  Lipid Profile      3. Essential hypertension  losartan (COZAAR) 50 MG Tab      4. Paroxysmal atrial fibrillation (HCC)  EKG            Medical Decision Making:  Mr. Jensen Donald is a 49-year-old man with past medical history significant for remote history of atrial fibrillation, hypertension, CAD, dyslipidemia who presents to the cardiology office for follow-up.    1. Coronary artery disease involving native coronary artery of native heart without angina pectoris  -Multivessel CAD with <50% stenosis involving LAD and 50% stenosis of mid LCX on coronary CTA.  -Stable without exertional symptoms.  Needs aggressive cardiovascular risk factor modification.  Continue high intensity atorvastatin 80 mg daily.  -Continue aspirin 81 mg daily.  -Discussed lifestyle modifications including exercise and dietary changes.  Recommend at least 150 minutes of moderate intensity exercise or 75 minutes of vigorous aerobic activity on a weekly basis.  Recommend dietary changes including incorporating heart healthy, AHA/Mediterranean diet.    2. Primary  hypertension  -BP well-controlled and at goal of less than 130/80 mmHg.  Continue losartan 50 mg daily    3. Mixed hyperlipidemia  -Excellent LDL control.  Continue atorvastatin 80 mg daily.  Given relatively young age and CAD history, goal LDL should be less than 70.  We will repeat lipid panel in the next month to ensure LDL remains stable.    4. PAF (paroxysmal atrial fibrillation) (HCC)  -Remote history of atrial fibrillation over a decade ago.  Does not appear that he has had a recurrence.  In the event that he has a recurrence of atrial fibrillation will need to be started on anticoagulation.  Continue to monitor for now.    () Today's E/M visit is associated with medical care services that serve as the continuing focal point for all needed health care services and/or with medical care services that  are part of ongoing care related to a patient's single, serious condition, or a complex condition: This includes  furnishing services to patients on an ongoing basis that result in care that is personalized  to the patient. The services result in a comprehensive, longitudinal, and continuous  relationship with the patient and involve delivery of team-based care that is accessible, coordinated with other practitioners and providers, and integrated with the broader health care landscape.       It was a pleasure seeing Mr. Jensen Donald in the office today. Return in about 1 year (around 8/8/2025) for Coronary artery disease, Hypertension. Patient is aware to call the cardiology clinic with any questions or concerns.      Filemon Oviedo MD, [facility]  Cardiologist, [facility]  [facility], [address]  [address] [address]  Phone: [phone]  Fax: [phone]    Please note that this dictation was created using voice recognition software. I have made every reasonable attempt to correct obvious errors, but it is possible there are errors  of grammar and possibly content that I did not discover before finalizing the note.

## 2024-09-11 ENCOUNTER — PATIENT MESSAGE (OUTPATIENT)
Dept: CARDIOLOGY | Facility: MEDICAL CENTER | Age: 49
End: 2024-09-11
Payer: COMMERCIAL

## 2024-09-12 ENCOUNTER — OFFICE VISIT (OUTPATIENT)
Dept: URGENT CARE | Facility: PHYSICIAN GROUP | Age: 49
End: 2024-09-12
Payer: COMMERCIAL

## 2024-09-12 ENCOUNTER — APPOINTMENT (OUTPATIENT)
Dept: TELEHEALTH | Facility: TELEMEDICINE | Age: 49
End: 2024-09-12
Payer: COMMERCIAL

## 2024-09-12 VITALS
DIASTOLIC BLOOD PRESSURE: 80 MMHG | HEART RATE: 87 BPM | WEIGHT: 217.4 LBS | RESPIRATION RATE: 20 BRPM | HEIGHT: 72 IN | TEMPERATURE: 99.8 F | OXYGEN SATURATION: 99 % | SYSTOLIC BLOOD PRESSURE: 138 MMHG | BODY MASS INDEX: 29.45 KG/M2

## 2024-09-12 DIAGNOSIS — I10 PRIMARY HYPERTENSION: ICD-10-CM

## 2024-09-12 DIAGNOSIS — I48.0 PAF (PAROXYSMAL ATRIAL FIBRILLATION) (HCC): ICD-10-CM

## 2024-09-12 DIAGNOSIS — U07.1 COVID-19: ICD-10-CM

## 2024-09-12 PROBLEM — R00.2 PALPITATION: Status: RESOLVED | Noted: 2022-03-09 | Resolved: 2024-09-12

## 2024-09-12 PROBLEM — H69.93 DYSFUNCTION OF BOTH EUSTACHIAN TUBES: Status: RESOLVED | Noted: 2018-11-26 | Resolved: 2024-09-12

## 2024-09-12 PROBLEM — J00 ACUTE RHINITIS: Status: RESOLVED | Noted: 2023-05-09 | Resolved: 2024-09-12

## 2024-09-12 PROBLEM — R07.89 CHEST WALL DISCOMFORT: Status: RESOLVED | Noted: 2022-03-30 | Resolved: 2024-09-12

## 2024-09-12 PROCEDURE — 3075F SYST BP GE 130 - 139MM HG: CPT | Performed by: FAMILY MEDICINE

## 2024-09-12 PROCEDURE — 3079F DIAST BP 80-89 MM HG: CPT | Performed by: FAMILY MEDICINE

## 2024-09-12 PROCEDURE — 99214 OFFICE O/P EST MOD 30 MIN: CPT | Performed by: FAMILY MEDICINE

## 2024-09-12 ASSESSMENT — FIBROSIS 4 INDEX: FIB4 SCORE: 0.54

## 2024-09-12 NOTE — PROGRESS NOTES
Subjective:      49 y.o. male presents to urgent care for COVID-19. He started feeling unwell Tuesday and tested positive for COVID on a home test yesterday. He is experiencing fever, headache, body aches, sore throat, and cough. No tobacco product use.  He has a history of childhood asthma but has not needed medication for years.  He is vaccinated against COVID.  No known sick contacts.    He does have a history of atrial fibrillation and hypertension.  He is taking losartan and aspirin for these conditions.    He denies any other questions or concerns at this time.    Current problem list, medication, and past medical/surgical history were reviewed in Epic.    ROS  See HPI     Objective:      /80   Pulse 87   Temp 37.7 °C (99.8 °F) (Temporal)   Resp 20   Ht 1.829 m (6')   Wt 98.6 kg (217 lb 6.4 oz)   SpO2 99%   BMI 29.48 kg/m²     Physical Exam  Constitutional:       General: He is not in acute distress.     Appearance: He is not diaphoretic.   Cardiovascular:      Rate and Rhythm: Normal rate and regular rhythm.      Heart sounds: Normal heart sounds.   Pulmonary:      Effort: Pulmonary effort is normal. No respiratory distress.      Breath sounds: Normal breath sounds.   Neurological:      Mental Status: He is alert.   Psychiatric:         Mood and Affect: Affect normal.         Judgment: Judgment normal.       Assessment/Plan:     1. COVID-19  2. PAF (paroxysmal atrial fibrillation) (HCC)  3. Primary hypertension  I encouraged mask use, frequent handwashing, wiping down hard surfaces, etc. Tylenol and Ibuprofen were recommended for symptomatic relief. Upon chart review GFR was found to be >60.  Paxlovid (nirmatrelvir and ritonavir) is an FDA emergency use authorization drug intended for COVID-positive patients at high risk for severe disease including hospital and death; not FDA approved. Patient meets qualifications for this medication due to their history of hypertension and afib. Some adverse  effects include: Hypersensitivity reaction, anaphylaxis, hepatotoxicity, hepatitis, jaundice, diarrhea and / or others up to and including death. Patient is currently without indication of need for higher level of care. Patient/Guardian was given precautionary signs/symptoms that mandate immediate follow up and evaluation in the ED. The patient and/or guardian demonstrated a good understanding and agreed with the treatment plan.  - Nirmatrelvir&Ritonavir 300/100 20 x 150 MG & 10 x 100MG Tablet Therapy Pack; Take 300 mg nirmatrelvir (two 150 mg tablets) with 100 mg ritonavir (one 100 mg tablet) by mouth, with all three tablets taken together twice daily for 5 days.  Dispense: 30 Each; Refill: 0      Instructed to return to Urgent Care or nearest Emergency Department if symptoms fail to improve, for any change in condition, further concerns, or new concerning symptoms. Patient states understanding of the plan of care and discharge instructions.    Shantal Jimenez M.D.

## 2024-12-17 ENCOUNTER — HOSPITAL ENCOUNTER (EMERGENCY)
Facility: MEDICAL CENTER | Age: 49
End: 2024-12-17
Attending: EMERGENCY MEDICINE
Payer: COMMERCIAL

## 2024-12-17 ENCOUNTER — OFFICE VISIT (OUTPATIENT)
Dept: URGENT CARE | Facility: PHYSICIAN GROUP | Age: 49
End: 2024-12-17
Payer: COMMERCIAL

## 2024-12-17 ENCOUNTER — HOSPITAL ENCOUNTER (OUTPATIENT)
Dept: RADIOLOGY | Facility: MEDICAL CENTER | Age: 49
End: 2024-12-17
Attending: PHYSICIAN ASSISTANT
Payer: COMMERCIAL

## 2024-12-17 VITALS
OXYGEN SATURATION: 99 % | TEMPERATURE: 99.4 F | DIASTOLIC BLOOD PRESSURE: 88 MMHG | SYSTOLIC BLOOD PRESSURE: 148 MMHG | RESPIRATION RATE: 18 BRPM | HEART RATE: 59 BPM | WEIGHT: 223.3 LBS | BODY MASS INDEX: 30.25 KG/M2 | HEIGHT: 72 IN

## 2024-12-17 VITALS
HEART RATE: 59 BPM | DIASTOLIC BLOOD PRESSURE: 95 MMHG | HEIGHT: 72 IN | WEIGHT: 221.12 LBS | OXYGEN SATURATION: 96 % | SYSTOLIC BLOOD PRESSURE: 165 MMHG | TEMPERATURE: 99.8 F | BODY MASS INDEX: 29.95 KG/M2 | RESPIRATION RATE: 16 BRPM

## 2024-12-17 DIAGNOSIS — S76.012A STRAIN OF LEFT HIP, INITIAL ENCOUNTER: ICD-10-CM

## 2024-12-17 DIAGNOSIS — M25.552 LEFT HIP PAIN: ICD-10-CM

## 2024-12-17 DIAGNOSIS — R10.30 LOWER ABDOMINAL PAIN: Primary | ICD-10-CM

## 2024-12-17 LAB
APPEARANCE UR: CLEAR
BILIRUB UR STRIP-MCNC: NEGATIVE MG/DL
COLOR UR AUTO: YELLOW
CRP SERPL HS-MCNC: 0.71 MG/DL (ref 0–0.75)
ERYTHROCYTE [DISTWIDTH] IN BLOOD BY AUTOMATED COUNT: 37.7 FL (ref 35.9–50)
GLUCOSE UR STRIP.AUTO-MCNC: NEGATIVE MG/DL
HCT VFR BLD AUTO: 40.5 % (ref 42–52)
HGB BLD-MCNC: 14.1 G/DL (ref 14–18)
KETONES UR STRIP.AUTO-MCNC: NEGATIVE MG/DL
LEUKOCYTE ESTERASE UR QL STRIP.AUTO: NEGATIVE
MCH RBC QN AUTO: 30.1 PG (ref 27–33)
MCHC RBC AUTO-ENTMCNC: 34.8 G/DL (ref 32.3–36.5)
MCV RBC AUTO: 86.5 FL (ref 81.4–97.8)
NITRITE UR QL STRIP.AUTO: NEGATIVE
PH UR STRIP.AUTO: 8.5 [PH] (ref 5–8)
PLATELET # BLD AUTO: 207 K/UL (ref 164–446)
PMV BLD AUTO: 10.2 FL (ref 9–12.9)
PROT UR QL STRIP: NEGATIVE MG/DL
RBC # BLD AUTO: 4.68 M/UL (ref 4.7–6.1)
RBC UR QL AUTO: ABNORMAL
SP GR UR STRIP.AUTO: 1.01
UROBILINOGEN UR STRIP-MCNC: 0.2 MG/DL
WBC # BLD AUTO: 4.6 K/UL (ref 4.8–10.8)

## 2024-12-17 PROCEDURE — 36415 COLL VENOUS BLD VENIPUNCTURE: CPT

## 2024-12-17 PROCEDURE — 73502 X-RAY EXAM HIP UNI 2-3 VIEWS: CPT | Mod: LT

## 2024-12-17 PROCEDURE — 81002 URINALYSIS NONAUTO W/O SCOPE: CPT | Performed by: PHYSICIAN ASSISTANT

## 2024-12-17 PROCEDURE — 99283 EMERGENCY DEPT VISIT LOW MDM: CPT

## 2024-12-17 PROCEDURE — 3077F SYST BP >= 140 MM HG: CPT | Performed by: PHYSICIAN ASSISTANT

## 2024-12-17 PROCEDURE — 99214 OFFICE O/P EST MOD 30 MIN: CPT | Performed by: PHYSICIAN ASSISTANT

## 2024-12-17 PROCEDURE — 85027 COMPLETE CBC AUTOMATED: CPT

## 2024-12-17 PROCEDURE — 3079F DIAST BP 80-89 MM HG: CPT | Performed by: PHYSICIAN ASSISTANT

## 2024-12-17 PROCEDURE — 86140 C-REACTIVE PROTEIN: CPT

## 2024-12-17 RX ORDER — NAPROXEN 500 MG/1
500 TABLET ORAL 2 TIMES DAILY WITH MEALS
Qty: 20 TABLET | Refills: 0 | Status: SHIPPED | OUTPATIENT
Start: 2024-12-17 | End: 2024-12-27

## 2024-12-17 ASSESSMENT — ENCOUNTER SYMPTOMS
SHORTNESS OF BREATH: 0
CONSTIPATION: 0
FEVER: 1
MYALGIAS: 0
CHILLS: 0
ABDOMINAL PAIN: 0
DIAPHORESIS: 1
VOMITING: 0
EYE PAIN: 0
COUGH: 0
SORE THROAT: 0
DIARRHEA: 0
NAUSEA: 0
HEADACHES: 0

## 2024-12-17 ASSESSMENT — FIBROSIS 4 INDEX
FIB4 SCORE: 0.54
FIB4 SCORE: 0.54

## 2024-12-17 NOTE — ED PROVIDER NOTES
"ED Provider Note    CHIEF COMPLAINT  Chief Complaint   Patient presents with    Hip Pain     \"Just started hurting\" couple of days ago         EXTERNAL RECORDS REVIEWED  Other I reviewed a note from the urgent care from earlier today when the patient presented with left hip pain.  There was some concern as he is had some chills and diaphoresis that they wanted him to be evaluated in the emergency department.    HPI/ROS    Jensen Donald is a 49 y.o. male who presents with atraumatic left hip pain.  He states this happened a couple of days ago.  He states has been so severe he has felt nauseated and a little sweaty.  He states this is atraumatic in nature.  The pain is located in the lateral and anterior aspect of the left hip.  He does not have any abdominal pain.  He has not had any rhinorrhea nor cough.    PAST MEDICAL HISTORY   has a past medical history of A-fib (HCC), Geographic tongue, and Hypertension.    SURGICAL HISTORY  patient denies any surgical history    FAMILY HISTORY  Family History   Problem Relation Age of Onset    Diabetes Mother     Lung Disease Son        SOCIAL HISTORY  Social History     Tobacco Use    Smoking status: Former     Current packs/day: 0.25     Average packs/day: 0.3 packs/day for 1 year (0.3 ttl pk-yrs)     Types: Cigarettes    Smokeless tobacco: Former   Vaping Use    Vaping status: Never Used   Substance and Sexual Activity    Alcohol use: Yes     Alcohol/week: 1.8 oz     Types: 1 Glasses of wine, 1 Cans of beer, 1 Shots of liquor per week    Drug use: Not Currently    Sexual activity: Yes     Partners: Female     Comment: wife, Fern       CURRENT MEDICATIONS  Home Medications    **Home medications have not yet been reviewed for this encounter**         ALLERGIES  Allergies   Allergen Reactions    Diltiazem Hcl      Other reaction(s): Headache    Bee Venom     Lisinopril      cough  Other reaction(s): cough    Other Drug      Can't remember.  Few yrs ago. Anti-anxiety " pill       PHYSICAL EXAM  VITAL SIGNS: BP (!) 165/95   Pulse (!) 59   Temp 37.7 °C (99.8 °F) (Temporal)   Resp 16   Ht 1.829 m (6')   Wt 100 kg (221 lb 1.9 oz)   SpO2 96%   BMI 29.99 kg/m²    In general the patient does not appear toxic    HEENT unremarkable    Pulmonary the patient's lungs are clear to auscultation bilaterally    Cardiovascular S1-S2 with a regular rate and rhythm    GI abdomen soft    Extremities patient does have some pain over the greater trochanter on the left as well as anterior over the superior part of his pelvis.  He does not have any pain with internal/external rotation of the hip at the left lower extremity    Skin no erythema    Neurologic examination is grossly intact    EKG/LABS  Results for orders placed or performed during the hospital encounter of 12/17/24   CBC WITHOUT DIFFERENTIAL    Collection Time: 12/17/24  3:12 PM   Result Value Ref Range    WBC 4.6 (L) 4.8 - 10.8 K/uL    RBC 4.68 (L) 4.70 - 6.10 M/uL    Hemoglobin 14.1 14.0 - 18.0 g/dL    Hematocrit 40.5 (L) 42.0 - 52.0 %    MCV 86.5 81.4 - 97.8 fL    MCH 30.1 27.0 - 33.0 pg    MCHC 34.8 32.3 - 36.5 g/dL    RDW 37.7 35.9 - 50.0 fL    Platelet Count 207 164 - 446 K/uL    MPV 10.2 9.0 - 12.9 fL   CRP QUANTITIVE (NON-CARDIAC)    Collection Time: 12/17/24  3:12 PM   Result Value Ref Range    Stat C-Reactive Protein 0.71 0.00 - 0.75 mg/dL     *Note: Due to a large number of results and/or encounters for the requested time period, some results have not been displayed. A complete set of results can be found in Results Review.       I have independently interpreted this EKG    RADIOLOGY/PROCEDURES   I have independently interpreted the diagnostic imaging associated with this visit and am waiting the final reading from the radiologist.   My preliminary interpretation is as follows: Reviewed an x-ray performed from urgent care and there is no acute fracture nor abnormality      COURSE & MEDICAL DECISION MAKING    This is a  49-year-old male who presents with left hip discomfort.  He was sent from urgent care for potential infectious standpoint.  The patient's CBC and C-reactive protein does not support infection.  Clinically do not see any evidence of infection.  I will prescribe naproxen as I suspect this could be from arthritic change after reviewing his x-ray.  I would like him to follow-up with orthopedics if he is not better in 4 to 5 days.    Impression    Left hip pain    Disposition  The patient will be discharged in stable condition  Electronically signed by: Doug English M.D., 12/17/2024 2:42 PM

## 2024-12-17 NOTE — ED TRIAGE NOTES
"Chief Complaint   Patient presents with    Hip Pain     \"Just started hurting\" couple of days ago       BP (!) 165/95   Pulse (!) 59   Temp 37.7 °C (99.8 °F) (Temporal)   Resp 16   Ht 1.829 m (6')   Wt 100 kg (221 lb 1.9 oz)   SpO2 96%   BMI 29.99 kg/m²     Pt ambulated to ED by self for c/o Left Hip pain started few days ago, states pain is getting progressively worse.  Pt denies any recent injuries/illnesses.  Pt was seen at , radiology results posted.    "

## 2024-12-17 NOTE — PROGRESS NOTES
From: Salvador Cohen  To: Ashleigh Kamara NP  Sent: 3/2/2020 8:36 AM CST  Subject: Other    This message is being sent by Debora Cohen on behalf of Salvador Cohen    Good Morning Delfino Frost was out last week with a mysterious fever starting Thursday about 1100 and all day Friday. We took him to urgent care right down the street from us. But all of his tests came back negative. Could you please fill out the [facility] paperwork for this visit so I can turn it into work for missing Thursday (half day) and Friday. Thanks!   Subjective:   Jensen Donald is a 49 y.o. male who presents for Other (Hip area pain goes up to his stomach, sweats started this morning)      Was out of town and sleeping in different bed, slept in different position. Symptoms started out mild 3-4 days ago and pain has been getting worse. Has been driving a lot which may have aggravated it.  This morning noted more significant pain with weight bearing.  Heat and cold helps.  Woke up with subjective fevers and chills today.      Review of Systems   Constitutional:  Positive for diaphoresis, fever and malaise/fatigue. Negative for chills.   HENT:  Negative for congestion, ear pain and sore throat.    Eyes:  Negative for pain.   Respiratory:  Negative for cough and shortness of breath.    Cardiovascular:  Negative for chest pain.   Gastrointestinal:  Negative for abdominal pain, constipation, diarrhea, nausea and vomiting.   Genitourinary:  Negative for dysuria.   Musculoskeletal:  Positive for joint pain. Negative for myalgias.   Skin:  Negative for rash.   Neurological:  Negative for headaches.       Medications, Allergies, and current problem list reviewed today in Epic.     Objective:     BP (!) 148/88   Pulse (!) 59   Temp 37.4 °C (99.4 °F)   Resp 18   Ht 1.829 m (6')   Wt 101 kg (223 lb 4.8 oz)   SpO2 99%     Physical Exam  Vitals reviewed.   Constitutional:       Appearance: Normal appearance. He is not ill-appearing or toxic-appearing.   HENT:      Head: Normocephalic and atraumatic.      Right Ear: External ear normal.      Left Ear: External ear normal.      Nose: Nose normal.      Mouth/Throat:      Mouth: Mucous membranes are moist.   Eyes:      Conjunctiva/sclera: Conjunctivae normal.   Cardiovascular:      Rate and Rhythm: Normal rate.   Pulmonary:      Effort: Pulmonary effort is normal.   Musculoskeletal:      Comments: Pain with internal rotation, adduction, and flexion both active and passive. Weightbearing with mild limp   Skin:      General: Skin is warm and dry.      Capillary Refill: Capillary refill takes less than 2 seconds.   Neurological:      Mental Status: He is alert and oriented to person, place, and time.         RADIOLOGY RESULTS   DX-HIP-COMPLETE - UNILATERAL 2+ LEFT    Result Date: 12/17/2024 12/17/2024 11:15 AM HISTORY/REASON FOR EXAM:  Atraumatic Pelvic/Hip Pain; progressive soreness of hip.  LEFT hip pain. TECHNIQUE/EXAM DESCRIPTION AND NUMBER OF VIEWS:  2 views of the LEFT hip. COMPARISON: 2/26/2024 FINDINGS: Pelvis and sacrum are intact. Degenerative change of lower lumbar spine. Visualized LEFT proximal femur is intact and normally located. Mild loss of joint space of both hips with osteophyte formation. Mildly prominent stool in the rectum.     1.  Mild degenerative change of lower lumbar spine and both hips. 2.  No LEFT hip or pelvic fracture.           Assessment/Plan:     Diagnosis and associated orders:     1. Lower abdominal pain  POCT Urinalysis      2. Strain of left hip, initial encounter  DX-HIP-COMPLETE - UNILATERAL 2+ LEFT         Comments/MDM:     Patient describes worsening insidious onset left-sided hip pain, today the pain is significantly worse and he has new onset of subjective fevers, chills and diaphoresis.  He does have some mild arthritic changes to the hip but no significant bony injury.  I discussed with him a broad differential such as hip flexor strain, acetabular impingement however he was quite concerned about the fevers and chills and in the context of his worsening hip pain this could represent septic arthritis or more serious etiology.  As such I have discussed with him if he has this concern would recommend ER evaluation as the timing is quite concerning for an infectious process.  I contacted the transfer center and he will plan on presenting to the [facility] ER for further evaluation.  I will leave any further testing or diagnosis at the discretion of the ERP.         Differential  diagnosis, natural history, supportive care, and indications for immediate follow-up discussed.    Advised the patient to follow-up with the primary care physician for recheck, reevaluation, and consideration of further management.    Please note that this dictation was created using voice recognition software. I have made a reasonable attempt to correct obvious errors, but I expect that there are errors of grammar and possibly content that I did not discover before finalizing the note.    This note was electronically signed by Quintin Lassiter PA-C

## 2024-12-17 NOTE — DISCHARGE INSTRUCTIONS
Take the naproxen as prescribed.  Follow-up with orthopedics if not better in 5 to 7 days and return if you are acutely worse.

## 2025-01-08 ENCOUNTER — OFFICE VISIT (OUTPATIENT)
Dept: CARDIOLOGY | Facility: MEDICAL CENTER | Age: 50
End: 2025-01-08
Attending: INTERNAL MEDICINE
Payer: COMMERCIAL

## 2025-01-08 VITALS
SYSTOLIC BLOOD PRESSURE: 128 MMHG | HEIGHT: 72 IN | OXYGEN SATURATION: 100 % | WEIGHT: 218 LBS | HEART RATE: 62 BPM | DIASTOLIC BLOOD PRESSURE: 78 MMHG | RESPIRATION RATE: 16 BRPM | BODY MASS INDEX: 29.53 KG/M2

## 2025-01-08 DIAGNOSIS — R53.83 FATIGUE, UNSPECIFIED TYPE: ICD-10-CM

## 2025-01-08 DIAGNOSIS — I48.0 PAF (PAROXYSMAL ATRIAL FIBRILLATION) (HCC): ICD-10-CM

## 2025-01-08 DIAGNOSIS — I10 ESSENTIAL HYPERTENSION: ICD-10-CM

## 2025-01-08 DIAGNOSIS — E78.2 MIXED HYPERLIPIDEMIA: ICD-10-CM

## 2025-01-08 PROCEDURE — 99211 OFF/OP EST MAY X REQ PHY/QHP: CPT | Performed by: INTERNAL MEDICINE

## 2025-01-08 PROCEDURE — 3078F DIAST BP <80 MM HG: CPT | Performed by: INTERNAL MEDICINE

## 2025-01-08 PROCEDURE — 99214 OFFICE O/P EST MOD 30 MIN: CPT | Performed by: INTERNAL MEDICINE

## 2025-01-08 PROCEDURE — 93005 ELECTROCARDIOGRAM TRACING: CPT | Mod: TC | Performed by: INTERNAL MEDICINE

## 2025-01-08 PROCEDURE — 3074F SYST BP LT 130 MM HG: CPT | Performed by: INTERNAL MEDICINE

## 2025-01-08 ASSESSMENT — FIBROSIS 4 INDEX: FIB4 SCORE: 0.52

## 2025-01-08 NOTE — PROGRESS NOTES
Chief Complaint   Patient presents with    Coronary Artery Disease     Follow up        Subjective     Jensen Donald is a 49 y.o. male who presents today as an add-on for recent onset fatigue.  He is a patient of Dr.Khieu gonzalez with a history of lone A-fib hypertension and hyperlipidemia who is very active and fit.  He fell off of his dietary and exercise routines over the recent past and began to feel fatigued.  Did not notice any other specific symptoms heart rate irregularities etc.  Taking his medications.  Scheduled this visit to ensure he did not have an underlying episode of A-fib.  Since getting back into his routine the past 3 to 4 days he notes he is feeling much better and reportedly back to baseline.    Past Medical History:   Diagnosis Date    A-fib (HCC)     4 years ago, one episode only, echo/stress test normal    Geographic tongue     Hypertension      History reviewed. No pertinent surgical history.  Family History   Problem Relation Age of Onset    Diabetes Mother     Lung Disease Son      Social History     Socioeconomic History    Marital status:      Spouse name: Not on file    Number of children: Not on file    Years of education: Not on file    Highest education level: Not on file   Occupational History    Not on file   Tobacco Use    Smoking status: Former     Current packs/day: 0.25     Average packs/day: 0.3 packs/day for 1 year (0.3 ttl pk-yrs)     Types: Cigarettes    Smokeless tobacco: Former   Vaping Use    Vaping status: Never Used   Substance and Sexual Activity    Alcohol use: Not Currently     Comment: occ    Drug use: Not Currently    Sexual activity: Yes     Partners: Female     Comment: wife, Fern   Other Topics Concern    Not on file   Social History Narrative    Not on file     Social Drivers of Health     Financial Resource Strain: Not on file   Food Insecurity: Not on file   Transportation Needs: Not on file   Physical Activity: Not on file    Stress: Not on file   Social Connections: Not on file   Intimate Partner Violence: Not on file   Housing Stability: Not on file     Allergies   Allergen Reactions    Diltiazem Hcl      Other reaction(s): Headache    Bee Venom     Lisinopril      cough  Other reaction(s): cough    Other Drug      Can't remember.  Few yrs ago. Anti-anxiety pill     Outpatient Encounter Medications as of 1/8/2025   Medication Sig Dispense Refill    aspirin (ASPIRIN LOW DOSE) 81 MG EC tablet Take 1 Tablet by mouth every day. 90 Tablet 3    atorvastatin (LIPITOR) 80 MG tablet Take 1 Tablet by mouth every evening. 90 Tablet 3    losartan (COZAAR) 50 MG Tab Take 1 Tablet by mouth every day. 100 Tablet 3    [DISCONTINUED] Nirmatrelvir&Ritonavir 300/100 20 x 150 MG & 10 x 100MG Tablet Therapy Pack Take 300 mg nirmatrelvir (two 150 mg tablets) with 100 mg ritonavir (one 100 mg tablet) by mouth, with all three tablets taken together twice daily for 5 days. (Patient not taking: Reported on 1/8/2025) 30 Each 0     No facility-administered encounter medications on file as of 1/8/2025.     Review of Systems   All other systems reviewed and are negative.             Objective     /78 (BP Location: Left arm, Patient Position: Sitting)   Pulse 62   Resp 16   Ht 1.829 m (6')   Wt 98.9 kg (218 lb)   SpO2 100%   BMI 29.57 kg/m²     Physical Exam  Vitals and nursing note reviewed.   Constitutional:       General: He is not in acute distress.     Appearance: Normal appearance.   HENT:      Head: Normocephalic and atraumatic.      Right Ear: External ear normal.      Left Ear: External ear normal.      Nose: Nose normal.   Eyes:      Conjunctiva/sclera: Conjunctivae normal.   Cardiovascular:      Rate and Rhythm: Normal rate and regular rhythm.      Pulses: Normal pulses.      Heart sounds: No murmur heard.  Pulmonary:      Effort: Pulmonary effort is normal. No respiratory distress.      Breath sounds: Normal breath sounds.   Abdominal:       "General: There is no distension.      Palpations: Abdomen is soft.   Musculoskeletal:      Cervical back: No rigidity or tenderness.      Right lower leg: No edema.      Left lower leg: No edema.   Skin:     General: Skin is warm and dry.      Capillary Refill: Capillary refill takes 2 to 3 seconds.   Neurological:      General: No focal deficit present.      Mental Status: He is alert and oriented to person, place, and time.   Psychiatric:         Mood and Affect: Mood normal.         Behavior: Behavior normal.         Thought Content: Thought content normal.       LABS:  Lab Results   Component Value Date/Time    CHOLSTRLTOT 132 12/15/2023 09:18 AM    LDL 58 12/15/2023 09:18 AM    HDL 63 12/15/2023 09:18 AM    TRIGLYCERIDE 57 12/15/2023 09:18 AM       Lab Results   Component Value Date/Time    WBC 4.6 (L) 12/17/2024 03:12 PM    RBC 4.68 (L) 12/17/2024 03:12 PM    HEMOGLOBIN 14.1 12/17/2024 03:12 PM    HEMATOCRIT 40.5 (L) 12/17/2024 03:12 PM    MCV 86.5 12/17/2024 03:12 PM    NEUTSPOLYS 48.90 12/15/2023 09:10 AM    LYMPHOCYTES 39.60 12/15/2023 09:10 AM    MONOCYTES 7.00 12/15/2023 09:10 AM    EOSINOPHILS 3.10 12/15/2023 09:10 AM    BASOPHILS 1.10 12/15/2023 09:10 AM    HYPOCHROMIA 1+ 11/15/2013 06:55 AM     Lab Results   Component Value Date/Time    SODIUM 139 12/15/2023 09:10 AM    POTASSIUM 4.7 12/15/2023 09:10 AM    CHLORIDE 104 12/15/2023 09:10 AM    CO2 26 12/15/2023 09:10 AM    GLUCOSE 94 12/15/2023 09:10 AM    BUN 26 (H) 12/15/2023 09:10 AM    CREATININE 1.20 12/15/2023 09:10 AM         Lab Results   Component Value Date/Time    ALKPHOSPHAT 68 03/12/2023 10:00 AM    ASTSGOT 12 03/12/2023 10:00 AM    ALTSGPT 30 03/12/2023 10:00 AM    TBILIRUBIN 1.1 03/12/2023 10:00 AM      Lab Results   Component Value Date/Time    BNPBTYPENAT 2 04/30/2014 02:00 AM      No results found for: \"TSH\"  Lab Results   Component Value Date/Time    PROTHROMBTM 13.0 12/15/2023 09:10 AM    INR 0.97 12/15/2023 09:10 AM        Imaging " reviewed including EKG from today showing sinus rhythm without acute findings.           Assessment & Plan     1. Fatigue, unspecified type  EKG      2. PAF (paroxysmal atrial fibrillation) (HCC)  EKG      3. Mixed hyperlipidemia        4. Essential hypertension            Medical Decision Making: Today's Assessment/Status/Plan:          Episodic fatigue likely related to deconditioning and weight gain now improving with restoration of his dietary and exercise habits.  He is losing weight and actually maintaining quite healthy overall body mass.  Very active with no exertional symptoms.  Recommend expectant management and routine follow-up with his longitudinal cardiologist Dr. Oviedo.    Thank you for this interesting consultation. It was my pleasure to see Jensen Donald today.    Kyle Mead MD, FACC, [facility]  Division of Interventional Cardiology  [facility]

## 2025-01-10 LAB — EKG IMPRESSION: NORMAL

## 2025-01-10 PROCEDURE — 93010 ELECTROCARDIOGRAM REPORT: CPT | Performed by: INTERNAL MEDICINE

## 2025-02-25 ENCOUNTER — OFFICE VISIT (OUTPATIENT)
Dept: URGENT CARE | Facility: PHYSICIAN GROUP | Age: 50
End: 2025-02-25
Payer: COMMERCIAL

## 2025-02-25 VITALS
HEIGHT: 72 IN | OXYGEN SATURATION: 96 % | DIASTOLIC BLOOD PRESSURE: 84 MMHG | TEMPERATURE: 98.5 F | HEART RATE: 70 BPM | SYSTOLIC BLOOD PRESSURE: 134 MMHG | WEIGHT: 221.8 LBS | BODY MASS INDEX: 30.04 KG/M2 | RESPIRATION RATE: 16 BRPM

## 2025-02-25 DIAGNOSIS — M54.2 NECK PAIN: ICD-10-CM

## 2025-02-25 DIAGNOSIS — H66.002 NON-RECURRENT ACUTE SUPPURATIVE OTITIS MEDIA OF LEFT EAR WITHOUT SPONTANEOUS RUPTURE OF TYMPANIC MEMBRANE: ICD-10-CM

## 2025-02-25 PROCEDURE — 99214 OFFICE O/P EST MOD 30 MIN: CPT

## 2025-02-25 PROCEDURE — 3079F DIAST BP 80-89 MM HG: CPT

## 2025-02-25 PROCEDURE — 3075F SYST BP GE 130 - 139MM HG: CPT

## 2025-02-25 ASSESSMENT — FIBROSIS 4 INDEX: FIB4 SCORE: 0.52

## 2025-02-25 ASSESSMENT — ENCOUNTER SYMPTOMS
TINGLING: 0
COUGH: 0
FEVER: 0
CHILLS: 0
NECK PAIN: 1
NAUSEA: 0
SHORTNESS OF BREATH: 0
SENSORY CHANGE: 0
SORE THROAT: 0
VOMITING: 0
ABDOMINAL PAIN: 0

## 2025-02-26 NOTE — PROGRESS NOTES
Chief Complaint   Patient presents with    Otalgia     Pt states he's had ear pain in his left ear, pain started Saturday.       HISTORY OF PRESENT ILLNESS: Patient is a pleasant 49 y.o. male who presents to urgent care today complaints of ongoing left-sided ear pain that started on Saturday.  Patient denies any pain from his throat, he does note a possible decayed tooth he is unsure if this is a contributing factor.    Complaints of ongoing neck pain, patient is wishing for referral to sports medicine and orthopedics.    Patient Active Problem List    Diagnosis Date Noted    PAF (paroxysmal atrial fibrillation) (HCC) 10/17/2023    Palpitations 03/12/2023    Coronary artery disease involving native coronary artery of native heart without angina pectoris 03/12/2023    Hyperkalemia 03/30/2022    Elevated LDL cholesterol level 03/30/2022    Leukopenia 03/30/2022    Vertigo 03/09/2022    Weight loss 03/02/2022    Blurred vision, bilateral 03/02/2022    Migraine with aura and without status migrainosus, not intractable 02/22/2022    Soft tissue mass 01/27/2021    Acute right-sided low back pain without sciatica 08/23/2018    Chronic left shoulder pain 04/07/2017    Gastroesophageal reflux disease 04/07/2017    Obesity (BMI 30-39.9) 04/07/2017    Pain in both knees 10/21/2016    HTN (hypertension) 12/19/2014    abnormal electrocardiogram (ECG) (EKG) 12/19/2014    Mixed hyperlipidemia 10/16/2014    Cyst of joint of ankle or foot 10/16/2014    Anxiety state 06/25/2008       Allergies:Diltiazem hcl, Bee venom, Lisinopril, and Other drug    Current Outpatient Medications Ordered in Epic   Medication Sig Dispense Refill    amoxicillin-clavulanate (AUGMENTIN) 875-125 MG Tab Take 1 Tablet by mouth 2 times a day for 7 days. 14 Tablet 0    aspirin (ASPIRIN LOW DOSE) 81 MG EC tablet Take 1 Tablet by mouth every day. 90 Tablet 3    atorvastatin (LIPITOR) 80 MG tablet Take 1 Tablet by mouth every evening. 90 Tablet 3    losartan  (COZAAR) 50 MG Tab Take 1 Tablet by mouth every day. 100 Tablet 3     No current Epic-ordered facility-administered medications on file.       Past Medical History:   Diagnosis Date    A-fib (HCC)     4 years ago, one episode only, echo/stress test normal    Geographic tongue     Hypertension        Social History     Tobacco Use    Smoking status: Former     Current packs/day: 0.25     Average packs/day: 0.3 packs/day for 1 year (0.3 ttl pk-yrs)     Types: Cigarettes    Smokeless tobacco: Former   Vaping Use    Vaping status: Never Used   Substance Use Topics    Alcohol use: Not Currently     Comment: occ    Drug use: Not Currently       Family Status   Relation Name Status    Mo      Placido  Alive    Son  Alive    Placido  Alive   No partnership data on file     Family History   Problem Relation Age of Onset    Diabetes Mother     Lung Disease Son        Review of Systems   Constitutional:  Negative for chills, fever and malaise/fatigue.   HENT:  Positive for ear pain. Negative for congestion, hearing loss and sore throat.    Respiratory:  Negative for cough and shortness of breath.    Gastrointestinal:  Negative for abdominal pain, nausea and vomiting.   Musculoskeletal:  Positive for neck pain.   Skin:  Negative for rash.   Neurological:  Negative for tingling and sensory change.       Exam:  /84   Pulse 70   Temp 36.9 °C (98.5 °F) (Temporal)   Resp 16   Ht 1.829 m (6')   Wt 101 kg (221 lb 12.8 oz)   SpO2 96%   Physical Exam  Vitals reviewed.   Constitutional:       Appearance: Normal appearance.   HENT:      Head: Normocephalic.      Right Ear: Tympanic membrane and ear canal normal. There is no impacted cerumen. Tympanic membrane is not injected or erythematous.      Left Ear: Ear canal normal. There is no impacted cerumen. Tympanic membrane is erythematous. Tympanic membrane is not injected.      Mouth/Throat:      Mouth: Mucous membranes are moist.      Pharynx: Oropharynx is clear. No  oropharyngeal exudate.      Tonsils: No tonsillar exudate. 0 on the right. 0 on the left.   Eyes:      General:         Right eye: No discharge.         Left eye: No discharge.      Extraocular Movements: Extraocular movements intact.      Conjunctiva/sclera: Conjunctivae normal.      Pupils: Pupils are equal, round, and reactive to light.   Cardiovascular:      Rate and Rhythm: Normal rate and regular rhythm.      Pulses: Normal pulses.      Heart sounds: Normal heart sounds. No murmur heard.  Pulmonary:      Effort: Pulmonary effort is normal. No respiratory distress.      Breath sounds: Normal breath sounds. No stridor. No wheezing.   Musculoskeletal:         General: Normal range of motion.      Cervical back: Normal range of motion. Tenderness present.   Lymphadenopathy:      Cervical: No cervical adenopathy.   Skin:     General: Skin is warm and dry.      Capillary Refill: Capillary refill takes less than 2 seconds.      Findings: No bruising or rash.   Neurological:      General: No focal deficit present.      Mental Status: He is alert.      Sensory: No sensory deficit.      Motor: No weakness.   Psychiatric:         Mood and Affect: Mood normal.         Behavior: Behavior normal.         Thought Content: Thought content normal.         Judgment: Judgment normal.             Assessment/Plan:  1. Non-recurrent acute suppurative otitis media of left ear without spontaneous rupture of tympanic membrane  - amoxicillin-clavulanate (AUGMENTIN) 875-125 MG Tab; Take 1 Tablet by mouth 2 times a day for 7 days.  Dispense: 14 Tablet; Refill: 0    2. Neck pain  - Referral to Sports Medicine  - Referral to Orthopedics    Based on physical exam along with review of systems I do think the patient likely has an ear infection on the left side, he does have some noted decay to the left upper tooth however it does not appear to be abscessed at this time, no swelling into his cheek, tongue or palate.  Should patient be  developing an abscess Augmentin will certainly cover for this.  Patient was notified to follow-up with a dentist for any ongoing concerns related to that tooth.    Complaints of ongoing neck pain, patient has had imaging in the past, this appears to be a chronic issue.  He is wishing a referral to orthopedics for a neck specialist.  I did also place a referral for sports medicine as patient is wishing to get to the root of the cause and not simply covered up with pain medications.  Perhaps sports medicine would be a good option.  He has full mobility of all of his limbs, he is mobile, no sensory loss that I can assess here in the office.    Supportive care, differential diagnoses, and indications for immediate follow-up discussed with patient.   Pathogenesis of diagnosis discussed including typical length and natural progression.   Instructed to return to clinic or nearest emergency department for any change in condition, further concerns, or worsening of symptoms.  Patient states understanding of the plan of care and discharge instructions.  Instructed to make an appointment, for follow up, with primary care provider.    Please note that this dictation was created using voice recognition software. I have made every reasonable attempt to correct obvious errors, but I expect that there are errors of grammar and possibly content that I did not discover before finalizing the note.      Radha BADILLO

## 2025-03-04 ENCOUNTER — HOSPITAL ENCOUNTER (EMERGENCY)
Facility: MEDICAL CENTER | Age: 50
End: 2025-03-04
Attending: EMERGENCY MEDICINE
Payer: COMMERCIAL

## 2025-03-04 VITALS
DIASTOLIC BLOOD PRESSURE: 85 MMHG | HEART RATE: 60 BPM | HEIGHT: 72 IN | OXYGEN SATURATION: 98 % | RESPIRATION RATE: 18 BRPM | WEIGHT: 220.24 LBS | BODY MASS INDEX: 29.83 KG/M2 | SYSTOLIC BLOOD PRESSURE: 155 MMHG | TEMPERATURE: 98.4 F

## 2025-03-04 DIAGNOSIS — M54.2 NECK PAIN: ICD-10-CM

## 2025-03-04 DIAGNOSIS — M26.622 ARTHRALGIA OF LEFT TEMPOROMANDIBULAR JOINT: ICD-10-CM

## 2025-03-04 PROCEDURE — 99283 EMERGENCY DEPT VISIT LOW MDM: CPT

## 2025-03-04 PROCEDURE — 700102 HCHG RX REV CODE 250 W/ 637 OVERRIDE(OP): Performed by: EMERGENCY MEDICINE

## 2025-03-04 PROCEDURE — A9270 NON-COVERED ITEM OR SERVICE: HCPCS | Performed by: EMERGENCY MEDICINE

## 2025-03-04 RX ORDER — OXYCODONE AND ACETAMINOPHEN 5; 325 MG/1; MG/1
1 TABLET ORAL EVERY 4 HOURS PRN
Qty: 20 TABLET | Refills: 0 | Status: SHIPPED | OUTPATIENT
Start: 2025-03-04 | End: 2025-03-09

## 2025-03-04 RX ORDER — ACETAMINOPHEN 325 MG/1
975 TABLET ORAL ONCE
Status: COMPLETED | OUTPATIENT
Start: 2025-03-04 | End: 2025-03-04

## 2025-03-04 RX ORDER — METHYLPREDNISOLONE 4 MG/1
TABLET ORAL
Qty: 1 EACH | Refills: 0 | Status: SHIPPED | OUTPATIENT
Start: 2025-03-04

## 2025-03-04 RX ADMIN — ACETAMINOPHEN 975 MG: 325 TABLET ORAL at 11:59

## 2025-03-04 RX ADMIN — IBUPROFEN 800 MG: 200 TABLET, FILM COATED ORAL at 11:59

## 2025-03-04 ASSESSMENT — FIBROSIS 4 INDEX: FIB4 SCORE: 0.52

## 2025-03-04 ASSESSMENT — PAIN DESCRIPTION - PAIN TYPE: TYPE: ACUTE PAIN

## 2025-03-04 NOTE — ED NOTES
Pt sitting in chair pending erp eval.    respirations even/unlabored.   Denies needs at this time.

## 2025-03-04 NOTE — ED PROVIDER NOTES
ED Provider Note    CHIEF COMPLAINT  Chief Complaint   Patient presents with    Ear Pain     Ear pain x1 week. Seen on 2/25 for same and prescribed Augmentin. Patient reports pain is now radiating into face. 8/10 throbbing pain.        EXTERNAL RECORDS REVIEWED  PDMP 1 controlled substance prescription    HPI/ROS  LIMITATION TO HISTORY   Select: : None  OUTSIDE HISTORIAN(S):  none    Jensen Donald is a 49 y.o. male who presents stating that he has a history of chronic neck pain and that 1 week ago he had left ear and TMJ pain.  He went to urgent care and was prescribed Augmentin which she has finished.  He still has the pain in that area as well as tightness in his neck.  He denies any focal neurologic deficits.  He denies any dental pain.      PAST MEDICAL HISTORY   has a past medical history of A-fib (HCC), Geographic tongue, and Hypertension.    SURGICAL HISTORY  patient denies any surgical history    FAMILY HISTORY  Family History   Problem Relation Age of Onset    Diabetes Mother     Lung Disease Son        SOCIAL HISTORY  Social History     Tobacco Use    Smoking status: Former     Current packs/day: 0.25     Average packs/day: 0.3 packs/day for 1 year (0.3 ttl pk-yrs)     Types: Cigarettes    Smokeless tobacco: Former   Vaping Use    Vaping status: Never Used   Substance and Sexual Activity    Alcohol use: Not Currently     Comment: occ    Drug use: Not Currently    Sexual activity: Yes     Partners: Female     Comment: wife, Fern       CURRENT MEDICATIONS  Home Medications       Reviewed by Coby Lange RJOHN (Registered Nurse) on 03/04/25 at 1042  Med List Status: Partial     Medication Last Dose Status   amoxicillin-clavulanate (AUGMENTIN) 875-125 MG Tab  Active   aspirin (ASPIRIN LOW DOSE) 81 MG EC tablet  Active   atorvastatin (LIPITOR) 80 MG tablet  Active   losartan (COZAAR) 50 MG Tab  Active                    ALLERGIES  Allergies   Allergen Reactions    Diltiazem Hcl      Other  reaction(s): Headache    Bee Venom     Lisinopril      cough  Other reaction(s): cough    Other Drug      Can't remember.  Few yrs ago. Anti-anxiety pill       PHYSICAL EXAM  VITAL SIGNS: BP (!) 155/85   Pulse 60   Temp 36.9 °C (98.4 °F) (Temporal)   Resp 18   Ht 1.829 m (6')   Wt 99.9 kg (220 lb 3.8 oz)   SpO2 98%   BMI 29.87 kg/m²      Constitutional: Alert in no apparent distress.   HENT: Normocephalic, Atraumatic, Bilateral external ears normal, Nose normal. Moist mucous membranes.  Eyes: Pupils are equal and reactive, Conjunctiva normal, Non-icteric.   Ears: Normal TM B  Left TMJ is tender to palpation.  Throat: No edema, No exudate, Midline uvula.  Neck: Normal range of motion, No tenderness, Supple, No stridor. No evidence of meningeal irritation.  Lymphatic: No lymphadenopathy noted.   Cardiovascular: Regular rate and rhythm, no murmurs.   Thorax & Lungs: Normal breath sounds, No respiratory distress, No wheezing.    Abdomen: Bowel sounds normal, Soft, No tenderness, No masses.  Skin: Warm, Dry, No erythema, No rash, No Petechiae.   Musculoskeletal: Good range of motion in all major joints. No tenderness to palpation or major deformities noted.   Neurologic: Alert, Normal motor function, Normal sensory function, No focal deficits noted.            COURSE & MEDICAL DECISION MAKING    ASSESSMENT, COURSE AND PLAN  Care Narrative:     Patient presents with left-sided neck pain and left-sided TMJ pain.  His ear exam is normal.  His teeth do not appear to have cavities but he may still be having a dental problem.  I would like him to follow-up with his dentist, I would like him to follow-up with a TMJ specialist, I have given him the phone number for the spine specialist on-call as well.  Here was given Tylenol and Motrin and I have prescribed him Percocet and Medrol Dosepak.              ADDITIONAL PROBLEMS MANAGED  Left TMJ pain, left-sided neck pain    DISPOSITION AND DISCUSSIONS  I have discussed  management of the patient with the following physicians and UYEN's: None    Discussion of management with other [facility] or appropriate source(s): None     Escalation of care considered, and ultimately not performed:Laboratory analysis and diagnostic imaging    Barriers to care at this time, including but not limited to:  None .     Decision tools and prescription drugs considered including, but not limited to:  Prescribed the patient Medrol Dosepak and Percocet .    I reviewed prescription monitoring program for patient's narcotic use before prescribing a scheduled drug.The patient will not drink alcohol nor drive with prescribed medications. The patient will return for new or worsening symptoms and is stable at the time of discharge.    The patient is referred to a primary physician for blood pressure management, diabetic screening, and for all other preventative health concerns.    In prescribing controlled substances to this patient, I certify that I have obtained and reviewed the medical history of Jensen Donald. I have also made a good renato effort to obtain applicable records from other providers who have treated the patient and records did not demonstrate any increased risk of substance abuse that would prevent me from prescribing controlled substances.     I have conducted a physical exam and documented it. I have reviewed Mr. Donald’s prescription history as maintained by the [place] Prescription Monitoring Program.     I have assessed the patient’s risk for abuse, dependency, and addiction using the validated Opioid Risk Tool available at https://www.mdcalc.com/dhulgv-cjlj-gfys-ort-narcotic-abuse.     Given the above, I believe the benefits of controlled substance therapy outweigh the risks. The reasons for prescribing controlled substances include non-narcotic, oral analgesic alternatives have been inadequate for pain control. Accordingly, I have discussed the risk and benefits, treatment plan,  and alternative therapies with the patient.       DISPOSITION:  Patient will be discharged home in stable condition.    FOLLOW UP:  [facility], Emergency Dept  [address]  [place] [address] [phone]    If symptoms worsen    Pasquale Sewell M.D.  [address]  [address]  [place] [id number] [phone]      call this spine specialist for follow up      Call your dentist for appointment to confirm that this is not a tooth problem          OUTPATIENT MEDICATIONS:  New Prescriptions    METHYLPREDNISOLONE (MEDROL DOSEPAK) 4 MG TABLET THERAPY PACK    Take Medrol Dosepak as directed    OXYCODONE-ACETAMINOPHEN (PERCOCET) 5-325 MG TAB    Take 1 Tablet by mouth every four hours as needed (pain) for up to 5 days. No driving, no drinking alcohol         FINAL DIAGNOSIS  1. Neck pain    2. Arthralgia of left temporomandibular joint         Electronically signed by: Antonio Ba M.D., 3/4/2025 11:33 AM

## 2025-03-04 NOTE — ED TRIAGE NOTES
Chief Complaint   Patient presents with    Ear Pain     Ear pain x1 week. Seen on 2/25 for same and prescribed Augmentin. Patient reports pain is now radiating into face. 8/10 throbbing pain.      Pt ambulatory to triage for above complaint.      Pt is alert/oriented and follows commands. Pt speaking in full sentences and responds appropriately to questions. No acute distress noted in triage and respirations are even and unlabored.     Pt placed in lobby and educated on triage process. Pt encouraged to alert staff for any changes in condition.

## 2025-03-04 NOTE — ED NOTES
Pt discharged to home. Pt was given follow up instructions and prescription information. Pt verbalized understanding of all instructions for discharge and is ambulatory out of ED with steady gait. AOx4

## 2025-03-11 SDOH — ECONOMIC STABILITY: FOOD INSECURITY: WITHIN THE PAST 12 MONTHS, THE FOOD YOU BOUGHT JUST DIDN'T LAST AND YOU DIDN'T HAVE MONEY TO GET MORE.: NEVER TRUE

## 2025-03-11 SDOH — HEALTH STABILITY: MENTAL HEALTH
STRESS IS WHEN SOMEONE FEELS TENSE, NERVOUS, ANXIOUS, OR CAN'T SLEEP AT NIGHT BECAUSE THEIR MIND IS TROUBLED. HOW STRESSED ARE YOU?: VERY MUCH

## 2025-03-11 SDOH — HEALTH STABILITY: PHYSICAL HEALTH: ON AVERAGE, HOW MANY DAYS PER WEEK DO YOU ENGAGE IN MODERATE TO STRENUOUS EXERCISE (LIKE A BRISK WALK)?: 5 DAYS

## 2025-03-11 SDOH — ECONOMIC STABILITY: INCOME INSECURITY: IN THE LAST 12 MONTHS, WAS THERE A TIME WHEN YOU WERE NOT ABLE TO PAY THE MORTGAGE OR RENT ON TIME?: NO

## 2025-03-11 SDOH — HEALTH STABILITY: PHYSICAL HEALTH: ON AVERAGE, HOW MANY MINUTES DO YOU ENGAGE IN EXERCISE AT THIS LEVEL?: 150+ MIN

## 2025-03-11 SDOH — ECONOMIC STABILITY: HOUSING INSECURITY
IN THE LAST 12 MONTHS, WAS THERE A TIME WHEN YOU DID NOT HAVE A STEADY PLACE TO SLEEP OR SLEPT IN A SHELTER (INCLUDING NOW)?: NO

## 2025-03-11 SDOH — ECONOMIC STABILITY: FOOD INSECURITY: WITHIN THE PAST 12 MONTHS, YOU WORRIED THAT YOUR FOOD WOULD RUN OUT BEFORE YOU GOT MONEY TO BUY MORE.: NEVER TRUE

## 2025-03-11 SDOH — ECONOMIC STABILITY: TRANSPORTATION INSECURITY
IN THE PAST 12 MONTHS, HAS LACK OF TRANSPORTATION KEPT YOU FROM MEETINGS, WORK, OR FROM GETTING THINGS NEEDED FOR DAILY LIVING?: NO

## 2025-03-11 SDOH — ECONOMIC STABILITY: TRANSPORTATION INSECURITY
IN THE PAST 12 MONTHS, HAS THE LACK OF TRANSPORTATION KEPT YOU FROM MEDICAL APPOINTMENTS OR FROM GETTING MEDICATIONS?: NO

## 2025-03-11 SDOH — ECONOMIC STABILITY: INCOME INSECURITY: HOW HARD IS IT FOR YOU TO PAY FOR THE VERY BASICS LIKE FOOD, HOUSING, MEDICAL CARE, AND HEATING?: NOT HARD AT ALL

## 2025-03-11 SDOH — ECONOMIC STABILITY: TRANSPORTATION INSECURITY
IN THE PAST 12 MONTHS, HAS LACK OF RELIABLE TRANSPORTATION KEPT YOU FROM MEDICAL APPOINTMENTS, MEETINGS, WORK OR FROM GETTING THINGS NEEDED FOR DAILY LIVING?: NO

## 2025-03-11 ASSESSMENT — SOCIAL DETERMINANTS OF HEALTH (SDOH)
HOW HARD IS IT FOR YOU TO PAY FOR THE VERY BASICS LIKE FOOD, HOUSING, MEDICAL CARE, AND HEATING?: NOT HARD AT ALL
HOW MANY DRINKS CONTAINING ALCOHOL DO YOU HAVE ON A TYPICAL DAY WHEN YOU ARE DRINKING: 1 OR 2
DO YOU BELONG TO ANY CLUBS OR ORGANIZATIONS SUCH AS CHURCH GROUPS UNIONS, FRATERNAL OR ATHLETIC GROUPS, OR SCHOOL GROUPS?: NO
DO YOU BELONG TO ANY CLUBS OR ORGANIZATIONS SUCH AS CHURCH GROUPS UNIONS, FRATERNAL OR ATHLETIC GROUPS, OR SCHOOL GROUPS?: NO
WITHIN THE PAST 12 MONTHS, YOU WORRIED THAT YOUR FOOD WOULD RUN OUT BEFORE YOU GOT THE MONEY TO BUY MORE: NEVER TRUE
HOW OFTEN DO YOU HAVE SIX OR MORE DRINKS ON ONE OCCASION: NEVER
HOW OFTEN DO YOU ATTEND CHURCH OR RELIGIOUS SERVICES?: NEVER
HOW OFTEN DO YOU GET TOGETHER WITH FRIENDS OR RELATIVES?: ONCE A WEEK
HOW OFTEN DO YOU HAVE A DRINK CONTAINING ALCOHOL: NEVER
IN THE PAST 12 MONTHS, HAS THE ELECTRIC, GAS, OIL, OR WATER COMPANY THREATENED TO SHUT OFF SERVICE IN YOUR HOME?: NO
IN A TYPICAL WEEK, HOW MANY TIMES DO YOU TALK ON THE PHONE WITH FAMILY, FRIENDS, OR NEIGHBORS?: MORE THAN THREE TIMES A WEEK
IN A TYPICAL WEEK, HOW MANY TIMES DO YOU TALK ON THE PHONE WITH FAMILY, FRIENDS, OR NEIGHBORS?: MORE THAN THREE TIMES A WEEK
HOW OFTEN DO YOU GET TOGETHER WITH FRIENDS OR RELATIVES?: ONCE A WEEK
HOW OFTEN DO YOU ATTEND CHURCH OR RELIGIOUS SERVICES?: NEVER

## 2025-03-11 ASSESSMENT — LIFESTYLE VARIABLES
SKIP TO QUESTIONS 9-10: 1
HOW MANY STANDARD DRINKS CONTAINING ALCOHOL DO YOU HAVE ON A TYPICAL DAY: 1 OR 2
AUDIT-C TOTAL SCORE: 0
HOW OFTEN DO YOU HAVE A DRINK CONTAINING ALCOHOL: NEVER
HOW OFTEN DO YOU HAVE SIX OR MORE DRINKS ON ONE OCCASION: NEVER

## 2025-03-12 ENCOUNTER — OFFICE VISIT (OUTPATIENT)
Dept: SPORTS MEDICINE | Facility: OTHER | Age: 50
End: 2025-03-12
Payer: COMMERCIAL

## 2025-03-12 VITALS
DIASTOLIC BLOOD PRESSURE: 82 MMHG | HEIGHT: 72 IN | WEIGHT: 220 LBS | TEMPERATURE: 98.5 F | BODY MASS INDEX: 29.8 KG/M2 | HEART RATE: 70 BPM | SYSTOLIC BLOOD PRESSURE: 122 MMHG | OXYGEN SATURATION: 98 %

## 2025-03-12 DIAGNOSIS — G89.29 CHRONIC LEFT SHOULDER PAIN: ICD-10-CM

## 2025-03-12 DIAGNOSIS — S46.812A TRAPEZIUS STRAIN, LEFT, INITIAL ENCOUNTER: ICD-10-CM

## 2025-03-12 DIAGNOSIS — M25.512 CHRONIC LEFT SHOULDER PAIN: ICD-10-CM

## 2025-03-12 PROCEDURE — 99213 OFFICE O/P EST LOW 20 MIN: CPT | Performed by: FAMILY MEDICINE

## 2025-03-12 PROCEDURE — 3074F SYST BP LT 130 MM HG: CPT | Performed by: FAMILY MEDICINE

## 2025-03-12 PROCEDURE — 3079F DIAST BP 80-89 MM HG: CPT | Performed by: FAMILY MEDICINE

## 2025-03-12 ASSESSMENT — ENCOUNTER SYMPTOMS
TINGLING: 0
SENSORY CHANGE: 0

## 2025-03-12 NOTE — PROGRESS NOTES
Subjective:     Jensen Donald is a 49 y.o. male who presents for Neck Pain    HPI  Pt presents for evaluation of an acute problem  Pt with chronic neck pain for the past few years   Had an injury a few years ago, but no recent falls or injuries   Pain is worse throughout the day   Feels like his neck is tight   Pain is worse on the left   Has some headaches from the neck pain   Pain is dull   Never has numbness or tingling     Review of Systems   Skin:  Negative for rash.   Neurological:  Negative for tingling and sensory change.       PMH:  has a past medical history of A-fib (HCC), Geographic tongue, and Hypertension.  MEDS:   Current Outpatient Medications:     meloxicam (MOBIC) 15 MG tablet, TAKE ONE TAB PO ONCE A DAY PRN FOR PAIN, Disp: 30 Tablet, Rfl: 0    methylPREDNISolone (MEDROL DOSEPAK) 4 MG Tablet Therapy Pack, Take Medrol Dosepak as directed, Disp: 1 Each, Rfl: 0    aspirin (ASPIRIN LOW DOSE) 81 MG EC tablet, Take 1 Tablet by mouth every day., Disp: 90 Tablet, Rfl: 3    atorvastatin (LIPITOR) 80 MG tablet, Take 1 Tablet by mouth every evening., Disp: 90 Tablet, Rfl: 3    losartan (COZAAR) 50 MG Tab, Take 1 Tablet by mouth every day., Disp: 100 Tablet, Rfl: 3  ALLERGIES:   Allergies   Allergen Reactions    Diltiazem Hcl      Other reaction(s): Headache    Bee Venom     Lisinopril      cough  Other reaction(s): cough    Other Drug      Can't remember.  Few yrs ago. Anti-anxiety pill     SURGHX: No past surgical history on file.  SOCHX:  reports that he has quit smoking. His smoking use included cigarettes. He has a 0.3 pack-year smoking history. He has quit using smokeless tobacco. He reports that he does not currently use alcohol. He reports that he does not currently use drugs.     Objective:   /82 (BP Location: Right arm, Patient Position: Sitting)   Pulse 70   Temp 36.9 °C (98.5 °F)   Ht 1.829 m (6')   Wt 99.8 kg (220 lb)   SpO2 98%   BMI 29.84 kg/m²     Physical  Exam  Constitutional:       General: He is not in acute distress.     Appearance: He is well-developed. He is not diaphoretic.   Pulmonary:      Effort: Pulmonary effort is normal.   Neurological:      Mental Status: He is alert.     Neck  General: No asymmetry, bruising, or erythema appreciated  ROM: FROM flex/extend/lateral bend/lateral rotation  Palpation: No TTP of spinous processes, no step-offs appreciated, no TTP of paraspinal muscles  Special tests: Neg Spurling's  Neuro: Sensation intact     Left shoulder  General: no gross deformity  ROM: flex 120, ER 60, IR equal  Palpation: TTP along superior trapezius   Strength: 5/5 abduction, 5/5 ER, 5/5 IR  Rotator Cuff: Empty can -, External rotation lag -  Impingement: Loco +  Biceps: Speed’s -  Neuro: Sensation intact    Assessment/Plan:   Assessment    1. Trapezius strain, left, initial encounter  - Referral to Physical Therapy  - Referral to Orthopedics    2. Chronic left shoulder pain  - Referral to Physical Therapy  - Referral to Orthopedics    Patient with trapezius strain.  Majority of his pain seems to be in the trapezius region, however he also has limited range of motion of his left shoulder.  When trying to reach anything above him, he does overly use his trapezius.  Suspect that even though the trapezius is the more painful region, that this is a secondary process.  X-ray shows some large osteophytes and left shoulder and his range of motion has been diminished for years.  Advised that his trapezius strain and shoulder pain will likely be improved by physical therapy, but do have concern that he may need surgery to regain the full use of his shoulder.  Recommended second opinion from orthopedist to discuss what options may be available for his shoulder.  Start physical therapy as soon as he can.

## 2025-03-12 NOTE — Clinical Note
REFERRAL APPROVAL NOTICE         Sent on March 12, 2025                   Jensen Donald  [address]   [place] [address]                   Dear Mr. Donald,    After a careful review of the medical information and benefit coverage, [facility] has processed your referral. See below for additional details.    If applicable, you must be actively enrolled with your insurance for coverage of the authorized service. If you have any questions regarding your coverage, please contact your insurance directly.    REFERRAL INFORMATION   Referral #:  [id number]  Referred-To Provider    Referred-By Provider:  Orthopedic Surgery    [facility]      [address]  [facility] [address]  [phone] [address]  [address]  [facility] [id number] [phone]    Referral Start Date:  03/12/2025  Referral End Date:   03/12/2026             SCHEDULING  If you do not already have an appointment, please call [phone] to make an appointment.     MORE INFORMATION  If you do not already have a "[employer]" account, sign up at: [email or web address]  You can access your medical information, make appointments, see lab results, billing information, and more.  If you have questions regarding this referral, please contact  the [facility] Referrals department at:             [phone]. Monday - Friday 8:00AM - 5:00PM.     Sincerely,    [facility]

## 2025-03-21 ENCOUNTER — HOSPITAL ENCOUNTER (OUTPATIENT)
Facility: MEDICAL CENTER | Age: 50
End: 2025-03-21
Payer: COMMERCIAL

## 2025-03-21 LAB
AMBIGUOUS DTTM AMBI4: NORMAL
APPEARANCE UR: CLEAR
BILIRUB UR QL STRIP.AUTO: NEGATIVE
COLOR UR: YELLOW
GLUCOSE UR STRIP.AUTO-MCNC: NEGATIVE MG/DL
KETONES UR STRIP.AUTO-MCNC: ABNORMAL MG/DL
LEUKOCYTE ESTERASE UR QL STRIP.AUTO: NEGATIVE
MICRO URNS: ABNORMAL
NITRITE UR QL STRIP.AUTO: NEGATIVE
PH UR STRIP.AUTO: 6 [PH] (ref 5–8)
PROT UR QL STRIP: NEGATIVE MG/DL
RBC UR QL AUTO: NEGATIVE
SP GR UR STRIP.AUTO: 1.03
UROBILINOGEN UR STRIP.AUTO-MCNC: 1 EU/DL

## 2025-03-21 PROCEDURE — 81003 URINALYSIS AUTO W/O SCOPE: CPT

## 2025-03-25 DIAGNOSIS — I25.10 CORONARY ARTERY DISEASE INVOLVING NATIVE CORONARY ARTERY OF NATIVE HEART WITHOUT ANGINA PECTORIS: ICD-10-CM

## 2025-03-25 RX ORDER — ASPIRIN 81 MG/1
81 TABLET ORAL
Qty: 90 TABLET | Refills: 3 | Status: SHIPPED | OUTPATIENT
Start: 2025-03-25

## 2025-04-02 ENCOUNTER — HOSPITAL ENCOUNTER (OUTPATIENT)
Facility: MEDICAL CENTER | Age: 50
End: 2025-04-03
Attending: EMERGENCY MEDICINE | Admitting: INTERNAL MEDICINE
Payer: COMMERCIAL

## 2025-04-02 ENCOUNTER — APPOINTMENT (OUTPATIENT)
Dept: RADIOLOGY | Facility: MEDICAL CENTER | Age: 50
End: 2025-04-02
Attending: EMERGENCY MEDICINE
Payer: COMMERCIAL

## 2025-04-02 DIAGNOSIS — R07.9 CHEST PAIN, UNSPECIFIED TYPE: ICD-10-CM

## 2025-04-02 DIAGNOSIS — R79.89 ELEVATED TROPONIN: ICD-10-CM

## 2025-04-02 LAB
ALBUMIN SERPL BCP-MCNC: 4.3 G/DL (ref 3.2–4.9)
ALBUMIN/GLOB SERPL: 1.7 G/DL
ALP SERPL-CCNC: 70 U/L (ref 30–99)
ALT SERPL-CCNC: 39 U/L (ref 2–50)
ANION GAP SERPL CALC-SCNC: 10 MMOL/L (ref 7–16)
AST SERPL-CCNC: 21 U/L (ref 12–45)
BASOPHILS # BLD AUTO: 1 % (ref 0–1.8)
BASOPHILS # BLD: 0.04 K/UL (ref 0–0.12)
BILIRUB SERPL-MCNC: 0.6 MG/DL (ref 0.1–1.5)
BUN SERPL-MCNC: 21 MG/DL (ref 8–22)
CALCIUM ALBUM COR SERPL-MCNC: 8.8 MG/DL (ref 8.5–10.5)
CALCIUM SERPL-MCNC: 9 MG/DL (ref 8.5–10.5)
CHLORIDE SERPL-SCNC: 105 MMOL/L (ref 96–112)
CO2 SERPL-SCNC: 23 MMOL/L (ref 20–33)
CREAT SERPL-MCNC: 0.95 MG/DL (ref 0.5–1.4)
EKG IMPRESSION: NORMAL
EOSINOPHIL # BLD AUTO: 0.1 K/UL (ref 0–0.51)
EOSINOPHIL NFR BLD: 2.6 % (ref 0–6.9)
ERYTHROCYTE [DISTWIDTH] IN BLOOD BY AUTOMATED COUNT: 39 FL (ref 35.9–50)
GFR SERPLBLD CREATININE-BSD FMLA CKD-EPI: 98 ML/MIN/1.73 M 2
GLOBULIN SER CALC-MCNC: 2.5 G/DL (ref 1.9–3.5)
GLUCOSE SERPL-MCNC: 105 MG/DL (ref 65–99)
HCT VFR BLD AUTO: 40.8 % (ref 42–52)
HGB BLD-MCNC: 13.9 G/DL (ref 14–18)
IMM GRANULOCYTES # BLD AUTO: 0.01 K/UL (ref 0–0.11)
IMM GRANULOCYTES NFR BLD AUTO: 0.3 % (ref 0–0.9)
LYMPHOCYTES # BLD AUTO: 1.21 K/UL (ref 1–4.8)
LYMPHOCYTES NFR BLD: 31.5 % (ref 22–41)
MCH RBC QN AUTO: 29.4 PG (ref 27–33)
MCHC RBC AUTO-ENTMCNC: 34.1 G/DL (ref 32.3–36.5)
MCV RBC AUTO: 86.4 FL (ref 81.4–97.8)
MONOCYTES # BLD AUTO: 0.25 K/UL (ref 0–0.85)
MONOCYTES NFR BLD AUTO: 6.5 % (ref 0–13.4)
NEUTROPHILS # BLD AUTO: 2.23 K/UL (ref 1.82–7.42)
NEUTROPHILS NFR BLD: 58.1 % (ref 44–72)
NRBC # BLD AUTO: 0 K/UL
NRBC BLD-RTO: 0 /100 WBC (ref 0–0.2)
NT-PROBNP SERPL IA-MCNC: <36 PG/ML (ref 0–125)
PLATELET # BLD AUTO: 216 K/UL (ref 164–446)
PMV BLD AUTO: 10 FL (ref 9–12.9)
POTASSIUM SERPL-SCNC: 4.4 MMOL/L (ref 3.6–5.5)
PROT SERPL-MCNC: 6.8 G/DL (ref 6–8.2)
RBC # BLD AUTO: 4.72 M/UL (ref 4.7–6.1)
SODIUM SERPL-SCNC: 138 MMOL/L (ref 135–145)
TROPONIN T SERPL-MCNC: 11 NG/L (ref 6–19)
TROPONIN T SERPL-MCNC: 22 NG/L (ref 6–19)
TROPONIN T SERPL-MCNC: <6 NG/L (ref 6–19)
WBC # BLD AUTO: 3.8 K/UL (ref 4.8–10.8)

## 2025-04-02 PROCEDURE — 700102 HCHG RX REV CODE 250 W/ 637 OVERRIDE(OP): Performed by: INTERNAL MEDICINE

## 2025-04-02 PROCEDURE — 99223 1ST HOSP IP/OBS HIGH 75: CPT | Performed by: INTERNAL MEDICINE

## 2025-04-02 PROCEDURE — 93005 ELECTROCARDIOGRAM TRACING: CPT | Mod: TC | Performed by: INTERNAL MEDICINE

## 2025-04-02 PROCEDURE — 99285 EMERGENCY DEPT VISIT HI MDM: CPT

## 2025-04-02 PROCEDURE — 93005 ELECTROCARDIOGRAM TRACING: CPT | Mod: TC

## 2025-04-02 PROCEDURE — 71045 X-RAY EXAM CHEST 1 VIEW: CPT

## 2025-04-02 PROCEDURE — 700111 HCHG RX REV CODE 636 W/ 250 OVERRIDE (IP): Mod: JZ | Performed by: INTERNAL MEDICINE

## 2025-04-02 PROCEDURE — A9270 NON-COVERED ITEM OR SERVICE: HCPCS | Performed by: INTERNAL MEDICINE

## 2025-04-02 PROCEDURE — 36415 COLL VENOUS BLD VENIPUNCTURE: CPT

## 2025-04-02 PROCEDURE — 80053 COMPREHEN METABOLIC PANEL: CPT

## 2025-04-02 PROCEDURE — 93005 ELECTROCARDIOGRAM TRACING: CPT | Mod: TC | Performed by: EMERGENCY MEDICINE

## 2025-04-02 PROCEDURE — 85025 COMPLETE CBC W/AUTO DIFF WBC: CPT

## 2025-04-02 PROCEDURE — 700102 HCHG RX REV CODE 250 W/ 637 OVERRIDE(OP): Performed by: EMERGENCY MEDICINE

## 2025-04-02 PROCEDURE — 96372 THER/PROPH/DIAG INJ SC/IM: CPT

## 2025-04-02 PROCEDURE — 83880 ASSAY OF NATRIURETIC PEPTIDE: CPT

## 2025-04-02 PROCEDURE — 84484 ASSAY OF TROPONIN QUANT: CPT | Mod: 91

## 2025-04-02 PROCEDURE — G0378 HOSPITAL OBSERVATION PER HR: HCPCS

## 2025-04-02 PROCEDURE — A9270 NON-COVERED ITEM OR SERVICE: HCPCS | Performed by: EMERGENCY MEDICINE

## 2025-04-02 RX ORDER — DOXYCYCLINE 100 MG/1
100 CAPSULE ORAL 2 TIMES DAILY
COMMUNITY
Start: 2025-03-21

## 2025-04-02 RX ORDER — LOSARTAN POTASSIUM 50 MG/1
50 TABLET ORAL DAILY
Status: DISCONTINUED | OUTPATIENT
Start: 2025-04-02 | End: 2025-04-03 | Stop reason: HOSPADM

## 2025-04-02 RX ORDER — ATORVASTATIN CALCIUM 80 MG/1
80 TABLET, FILM COATED ORAL EVERY EVENING
Status: DISCONTINUED | OUTPATIENT
Start: 2025-04-02 | End: 2025-04-03 | Stop reason: HOSPADM

## 2025-04-02 RX ORDER — ENOXAPARIN SODIUM 100 MG/ML
40 INJECTION SUBCUTANEOUS DAILY
Status: DISCONTINUED | OUTPATIENT
Start: 2025-04-02 | End: 2025-04-03 | Stop reason: HOSPADM

## 2025-04-02 RX ORDER — ASPIRIN 81 MG/1
324 TABLET, CHEWABLE ORAL ONCE
Status: COMPLETED | OUTPATIENT
Start: 2025-04-02 | End: 2025-04-02

## 2025-04-02 RX ORDER — ASPIRIN 81 MG/1
81 TABLET ORAL DAILY
Status: DISCONTINUED | OUTPATIENT
Start: 2025-04-02 | End: 2025-04-03 | Stop reason: HOSPADM

## 2025-04-02 RX ADMIN — ASPIRIN 324 MG: 81 TABLET, CHEWABLE ORAL at 12:12

## 2025-04-02 RX ADMIN — ENOXAPARIN SODIUM 40 MG: 100 INJECTION SUBCUTANEOUS at 17:08

## 2025-04-02 RX ADMIN — LOSARTAN POTASSIUM 50 MG: 50 TABLET, FILM COATED ORAL at 17:07

## 2025-04-02 RX ADMIN — ATORVASTATIN CALCIUM 80 MG: 80 TABLET, FILM COATED ORAL at 17:07

## 2025-04-02 ASSESSMENT — SOCIAL DETERMINANTS OF HEALTH (SDOH)

## 2025-04-02 ASSESSMENT — HEART SCORE
HEART SCORE: 5
RISK FACTORS: >2 RISK FACTORS OR HX OF ATHEROSCLEROTIC DISEASE
AGE: 45-64
HISTORY: SLIGHTLY SUSPICIOUS
ECG: NON-SPECIFIC REPOLARIZATION DISTURBANCE
TROPONIN: 1-3 TIMES NORMAL LIMIT

## 2025-04-02 ASSESSMENT — LIFESTYLE VARIABLES
EVER FELT BAD OR GUILTY ABOUT YOUR DRINKING: NO
ON A TYPICAL DAY WHEN YOU DRINK ALCOHOL HOW MANY DRINKS DO YOU HAVE: 0
TOTAL SCORE: 0
TOTAL SCORE: 0
CONSUMPTION TOTAL: NEGATIVE
AVERAGE NUMBER OF DAYS PER WEEK YOU HAVE A DRINK CONTAINING ALCOHOL: 0
ALCOHOL_USE: NO
HAVE PEOPLE ANNOYED YOU BY CRITICIZING YOUR DRINKING: NO
HAVE YOU EVER FELT YOU SHOULD CUT DOWN ON YOUR DRINKING: NO
TOTAL SCORE: 0
HOW MANY TIMES IN THE PAST YEAR HAVE YOU HAD 5 OR MORE DRINKS IN A DAY: 0
EVER HAD A DRINK FIRST THING IN THE MORNING TO STEADY YOUR NERVES TO GET RID OF A HANGOVER: NO

## 2025-04-02 ASSESSMENT — COGNITIVE AND FUNCTIONAL STATUS - GENERAL
SUGGESTED CMS G CODE MODIFIER MOBILITY: CJ
MOVING TO AND FROM BED TO CHAIR: A LITTLE
DAILY ACTIVITIY SCORE: 24
MOVING FROM LYING ON BACK TO SITTING ON SIDE OF FLAT BED: A LITTLE
SUGGESTED CMS G CODE MODIFIER DAILY ACTIVITY: CH
MOBILITY SCORE: 22

## 2025-04-02 ASSESSMENT — ENCOUNTER SYMPTOMS
CHILLS: 0
NERVOUS/ANXIOUS: 1
SHORTNESS OF BREATH: 0
COUGH: 0
ABDOMINAL PAIN: 1
VOMITING: 0
HEARTBURN: 1
NAUSEA: 0
FEVER: 0

## 2025-04-02 ASSESSMENT — PAIN DESCRIPTION - PAIN TYPE
TYPE: ACUTE PAIN
TYPE: ACUTE PAIN

## 2025-04-02 ASSESSMENT — FIBROSIS 4 INDEX: FIB4 SCORE: 0.76

## 2025-04-02 NOTE — H&P
Hospital Medicine History & Physical Note    Date of Service  4/2/2025    Primary Care Physician  Rosalee Vasquez M.D.    Consultants  none    Specialist Names: none    Code Status  Full Code    Chief Complaint  Chief Complaint   Patient presents with    Chest Pain     Started yesterday evening.   Substernal, more pain on the left, radiates up and down chest per pt. Nothing appears to make it worse or better.        History of Presenting Illness  Jensen Donald is a 49 y.o. male who presented 4/2/2025 with coronary artery disease with a serum CAC score of 217 and a 3D CT scan done in 2013 which showed 30 to 50% multivessel disease, hypertension, hyperlipidemia who presents to hospital with worsening chest pain.  Patient is adopted and therefore has no family history.  Patient states that he is a  by trade and is very active and eats very healthily and is extremely active with his physical routine including lifting weights daily.  He did get a shoulder injection of his left shoulder with intra-articular steroids for chronic shoulder pain.  He says after that his mobility of his arm improved tremendously and he is been lifting more weights than usual.  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  It continued overnight into this morning and given his history he was quite concerned.  Denies any associated nausea vomiting fevers chills or exertional symptoms or shortness of breath.  Here in the ER he continues to have mild to moderate chest pain.  His initial troponin was negative at less than 6 but then khadra to 23.  I personally spoke with Dr. Fink in the ER physician group in detail about the patient's case.    I discussed the plan of care with patient and family.    Review of Systems  Review of Systems   Constitutional:  Negative for chills and fever.    Respiratory:  Negative for cough and shortness of breath.    Cardiovascular:  Positive for chest pain. Negative for leg swelling.   Gastrointestinal:  Positive for abdominal pain and heartburn (?). Negative for nausea and vomiting.   Psychiatric/Behavioral:  The patient is nervous/anxious.        Past Medical History   has a past medical history of A-fib (HCC), Geographic tongue, and Hypertension.    Surgical History   has no past surgical history on file.     Family History  family history includes Diabetes in his mother; Lung Disease in his son.   Family history reviewed with patient. There is no family history that is pertinent to the chief complaint.     Social History   reports that he has quit smoking. His smoking use included cigarettes. He has a 0.3 pack-year smoking history. He has quit using smokeless tobacco. He reports that he does not currently use alcohol. He reports that he does not currently use drugs.    Allergies  Allergies   Allergen Reactions    Diltiazem Hcl Unspecified     Other reaction(s): Headache    Bee Venom     Lisinopril Cough     cough       Other Drug Unspecified     Can't remember.  Few yrs ago. Anti-anxiety pill       Medications  Prior to Admission Medications   Prescriptions Last Dose Informant Patient Reported? Taking?   Cyanocobalamin (VITAMIN B-12 PO) 4/1/2025 Evening Family Member Yes Yes   Sig: Take 1 Tablet by mouth every evening.   MAGNESIUM PO 4/1/2025 Evening Family Member Yes Yes   Sig: Take 1 Tablet by mouth every evening.   Omega-3 Fatty Acids (FISH OIL PO) 4/1/2025 Evening Family Member Yes Yes   Sig: Take 1 Capsule by mouth every evening.   POTASSIUM PO 4/1/2025 Evening Family Member Yes Yes   Sig: Take 1 Tablet by mouth every evening.   VITAMIN D PO 4/1/2025 Evening Family Member Yes Yes   Sig: Take 1 Tablet by mouth every evening.   aspirin (ASPIRIN LOW DOSE) 81 MG EC tablet 4/1/2025 Evening Family Member No Yes   Sig: Take 1 Tablet by mouth every day.    atorvastatin (LIPITOR) 80 MG tablet 4/1/2025 Evening Family Member No Yes   Sig: Take 1 Tablet by mouth every evening.   doxycycline (MONODOX) 100 MG capsule 3/14/2025 Family Member Yes Yes   Sig: Take 100 mg by mouth 2 times a day. 10 days  PRESCRIPTION COURSE WAS COMPLETED   losartan (COZAAR) 50 MG Tab 4/1/2025 Evening Family Member No Yes   Sig: Take 1 Tablet by mouth every day.   meloxicam (MOBIC) 15 MG tablet 3/31/2025 Family Member No No   Sig: TAKE ONE TAB PO ONCE A DAY PRN FOR PAIN   methylPREDNISolone (MEDROL DOSEPAK) 4 MG Tablet Therapy Pack 3/10/2025 Family Member No No   Sig: Take Medrol Dosepak as directed      Facility-Administered Medications: None       Physical Exam  Temp:  [36.7 °C (98 °F)-37.3 °C (99.2 °F)] 36.7 °C (98 °F)  Pulse:  [52-78] 75  Resp:  [10-18] 18  BP: (125-167)/(69-91) 125/71  SpO2:  [96 %-99 %] 96 %  Blood Pressure: (!) 167/85   Temperature: 37 °C (98.6 °F)   Pulse: (!) 58   Respiration: 13   Pulse Oximetry: 99 %       Physical Exam  Vitals and nursing note reviewed.   Constitutional:       General: He is not in acute distress.     Appearance: He is well-developed.      Comments: Lying in bed, slightly anxious appearing   HENT:      Head: Normocephalic and atraumatic.      Mouth/Throat:      Pharynx: No oropharyngeal exudate.   Eyes:      General: No scleral icterus.     Pupils: Pupils are equal, round, and reactive to light.   Neck:      Thyroid: No thyromegaly.   Cardiovascular:      Rate and Rhythm: Normal rate and regular rhythm.      Heart sounds: Normal heart sounds. No murmur heard.  Pulmonary:      Effort: Pulmonary effort is normal. No respiratory distress.      Breath sounds: Normal breath sounds. No wheezing.   Chest:      Chest wall: Tenderness (mild to moderate reproducible over the lower aspect of the L breast) present.   Abdominal:      General: Bowel sounds are normal. There is no distension.      Palpations: Abdomen is soft.      Tenderness: There is no abdominal  tenderness. There is no guarding or rebound.   Musculoskeletal:         General: No tenderness. Normal range of motion.      Cervical back: Normal range of motion and neck supple.      Right lower leg: No edema.      Left lower leg: No edema.   Skin:     General: Skin is warm and dry.      Findings: No rash.   Neurological:      Mental Status: He is alert and oriented to person, place, and time.      Cranial Nerves: No cranial nerve deficit.         Laboratory:  Recent Labs     04/02/25  0915   WBC 3.8*   RBC 4.72   HEMOGLOBIN 13.9*   HEMATOCRIT 40.8*   MCV 86.4   MCH 29.4   MCHC 34.1   RDW 39.0   PLATELETCT 216   MPV 10.0     Recent Labs     04/02/25  0915   SODIUM 138   POTASSIUM 4.4   CHLORIDE 105   CO2 23   GLUCOSE 105*   BUN 21   CREATININE 0.95   CALCIUM 9.0     Recent Labs     04/02/25  0915   ALTSGPT 39   ASTSGOT 21   ALKPHOSPHAT 70   TBILIRUBIN 0.6   GLUCOSE 105*         Recent Labs     04/02/25  0915   NTPROBNP <36         Recent Labs     04/02/25  0915 04/02/25  1112 04/02/25  1329   TROPONINT <6 22* 11       Imaging:  DX-CHEST-PORTABLE (1 VIEW)   Final Result      No evidence of acute cardiopulmonary process.      NM-CARDIAC STRESS TEST    (Results Pending)       I personally reviewed the chest x-ray which shows no acute findings.  There is clear costophrenic angles bilaterally.    I personally reviewed EKG which is normal sinus with a heart rate of 73.  No evidence of acute ST segment changes.    I personally reviewed the CBC, CMP, troponin, BMP.    Assessment/Plan:  Justification for Admission Status  I anticipate this patient is appropriate for observation status at this time because chest pain rule out    Patient will need a Telemetry bed on MEDICAL service .  The need is secondary to above.    * Chest pain- (present on admission)  Assessment & Plan  The 10-year ASCVD risk score (Shane RUBIO, et al., 2019) is: 1.5%    Values used to calculate the score:      Age: 49 years      Sex: Male      Is  Non- : No      Diabetic: No      Tobacco smoker: No      Systolic Blood Pressure: 125 mmHg      Is BP treated: Yes      HDL Cholesterol: 63 mg/dL      Total Cholesterol: 132 mg/dL      CAC score is 217, 3-D CTA in 2023 showed MVD with 30-50% stenoses  S/B [facility] in Fall 2024, somewhat atypical presentation, but reasonable to do work up  Initial trop normal, then increased, then normalized, reproducible on exam, suspect likely MSK component  Exercise treadmill in the AM with imaging  Serial trops  A1c and lipids  Already on asa and statin outpatient, continue  Monitor on tele, defer echo for now    PAF (paroxysmal atrial fibrillation) (HCC)- (present on admission)  Assessment & Plan  Very remote history, over 10 years, ago, no current evidence    Coronary artery disease involving native coronary artery of native heart without angina pectoris- (present on admission)  Assessment & Plan  As noted above, never had LHC, no family history since adopted      Anxiety state- (present on admission)  Assessment & Plan  Possible contributor to his presentation    HTN (hypertension)- (present on admission)  Assessment & Plan  Continue losartan, adjust prn        VTE prophylaxis: SCDs/TEDs and enoxaparin ppx

## 2025-04-02 NOTE — DISCHARGE PLANNING
TCN following. HTH/SCP chart review completed. Note pt currently in ED secondary to chest pain. Noted per chart review patient last seen in the ED on 3/4/25 for Ear pain.  Patient also followed by cardiology on 1/8/25.       Per review, has ambulated in ED with no AD and is on RA. At this time anticipating that pt will dc to home with OP f/u (either directly from ED or after admission to [facility] if warranted). Per chart patient has [facility] PCP and no PCP f/u scheduled at this time.      If pt admits to [facility], TCN will continue to monitor and assist with transitional care needs as indicated. Please reach out to TCN via VOALTE if post acute transitional care needs are warranted for dc planning.    *Update*  Noted patient is now observation.

## 2025-04-02 NOTE — ED PROVIDER NOTES
ED Provider Note    ED PHYSICIAN NOTE    CHIEF COMPLAINT  Chief Complaint   Patient presents with    Chest Pain     Started yesterday evening.   Substernal, more pain on the left, radiates up and down chest per pt. Nothing appears to make it worse or better.        EXTERNAL RECORDS REVIEWED  Outpatient Notes refills from cardiology March 25 with noted history of coronary artery disease, was refilled on his aspirin    HPI/ROS  LIMITATION TO HISTORY   Select: : None  OUTSIDE HISTORIAN(S):  none    Jensen Donald is a 49 y.o. male who presents with chest pain.  Patient reports he began having some chest pain yesterday afternoon while at work.  Describes it is more of an itching pressure type pain towards the center lower part of his chest.  He states it went way on its own, although returned overnight and has coming intermittently somewhat since then. there is no radiation of the pain to his back his arms or otherwise.  No ripping or tearing sensation.  He states no real alleviating or aggravating factors, may be better when he lays down.  He does not seem to be exertional and is not pleuritic.  He reports no shortness of breath.  No fevers or chills or cough or congestion.  No abdominal pain.  No nausea.  No leg pain or swelling.  No recent travel.    He does have history of coronary artery disease, hypertension and dyslipidemia.  Has been compliant with his medicines.  He reports he does keep a close eye on this and has a very physical job and is able to do this without any chest pain or shortness of breath recently    PAST MEDICAL HISTORY  Past Medical History:   Diagnosis Date    A-fib (HCC)     4 years ago, one episode only, echo/stress test normal    Geographic tongue     Hypertension        SOCIAL HISTORY  Social History     Tobacco Use    Smoking status: Former     Current packs/day: 0.25     Average packs/day: 0.3 packs/day for 1 year (0.3 ttl pk-yrs)     Types: Cigarettes    Smokeless tobacco:  Former   Vaping Use    Vaping status: Never Used   Substance Use Topics    Alcohol use: Not Currently     Comment: occ    Drug use: Not Currently       CURRENT MEDICATIONS  Home Medications       Reviewed by Saray Dubose R.N. (Registered Nurse) on 04/02/25 at 0910  Med List Status: Partial     Medication Last Dose Status   aspirin (ASPIRIN LOW DOSE) 81 MG EC tablet  Active   atorvastatin (LIPITOR) 80 MG tablet  Active   losartan (COZAAR) 50 MG Tab  Active   meloxicam (MOBIC) 15 MG tablet  Active   methylPREDNISolone (MEDROL DOSEPAK) 4 MG Tablet Therapy Pack  Active                  Audit from Redirected Encounters    **Home medications have not yet been reviewed for this encounter**         ALLERGIES  Allergies   Allergen Reactions    Diltiazem Hcl      Other reaction(s): Headache    Bee Venom     Lisinopril      cough  Other reaction(s): cough    Other Drug      Can't remember.  Few yrs ago. Anti-anxiety pill       PHYSICAL EXAM  VITAL SIGNS: BP (!) 142/81   Pulse (!) 52   Temp 37 °C (98.6 °F) (Temporal)   Resp 16   Ht 1.829 m (6')   Wt 100 kg (221 lb 1.9 oz)   SpO2 97%   BMI 29.99 kg/m²    Constitutional: Awake and alert   HENT: Normal inspection, no signs of trauma  Eyes: Normal inspection, Pupils equal, non-icteric  Neck: Grossly normal range of motion. No stridor  Cardiovascular: Regular rate and rhythm, no murmurs.  Symmetric peripheral pulses at radial   Thorax & Lungs: No respiratory distress, No wheezing, No rales, No rhonchi, No chest tenderness.   Abdomen:  soft, non-distended, nontender, no mass  Skin: No obvious rash. Warm. Dry.   Back: No tenderness, No CVA tenderness.   Extremities: No cyanosis, no edema  Neurologic: AO3, Grossly normal,  Psychiatric: Normal affect for situation        DIAGNOSTIC STUDIES / PROCEDURES  LABS/EKG  Results for orders placed or performed during the hospital encounter of 04/02/25   CBC with Differential    Collection Time: 04/02/25  9:15 AM   Result Value Ref  Range    WBC 3.8 (L) 4.8 - 10.8 K/uL    RBC 4.72 4.70 - 6.10 M/uL    Hemoglobin 13.9 (L) 14.0 - 18.0 g/dL    Hematocrit 40.8 (L) 42.0 - 52.0 %    MCV 86.4 81.4 - 97.8 fL    MCH 29.4 27.0 - 33.0 pg    MCHC 34.1 32.3 - 36.5 g/dL    RDW 39.0 35.9 - 50.0 fL    Platelet Count 216 164 - 446 K/uL    MPV 10.0 9.0 - 12.9 fL    Neutrophils-Polys 58.10 44.00 - 72.00 %    Lymphocytes 31.50 22.00 - 41.00 %    Monocytes 6.50 0.00 - 13.40 %    Eosinophils 2.60 0.00 - 6.90 %    Basophils 1.00 0.00 - 1.80 %    Immature Granulocytes 0.30 0.00 - 0.90 %    Nucleated RBC 0.00 0.00 - 0.20 /100 WBC    Neutrophils (Absolute) 2.23 1.82 - 7.42 K/uL    Lymphs (Absolute) 1.21 1.00 - 4.80 K/uL    Monos (Absolute) 0.25 0.00 - 0.85 K/uL    Eos (Absolute) 0.10 0.00 - 0.51 K/uL    Baso (Absolute) 0.04 0.00 - 0.12 K/uL    Immature Granulocytes (abs) 0.01 0.00 - 0.11 K/uL    NRBC (Absolute) 0.00 K/uL   Complete Metabolic Panel (CMP)    Collection Time: 04/02/25  9:15 AM   Result Value Ref Range    Sodium 138 135 - 145 mmol/L    Potassium 4.4 3.6 - 5.5 mmol/L    Chloride 105 96 - 112 mmol/L    Co2 23 20 - 33 mmol/L    Anion Gap 10.0 7.0 - 16.0    Glucose 105 (H) 65 - 99 mg/dL    Bun 21 8 - 22 mg/dL    Creatinine 0.95 0.50 - 1.40 mg/dL    Calcium 9.0 8.5 - 10.5 mg/dL    Correct Calcium 8.8 8.5 - 10.5 mg/dL    AST(SGOT) 21 12 - 45 U/L    ALT(SGPT) 39 2 - 50 U/L    Alkaline Phosphatase 70 30 - 99 U/L    Total Bilirubin 0.6 0.1 - 1.5 mg/dL    Albumin 4.3 3.2 - 4.9 g/dL    Total Protein 6.8 6.0 - 8.2 g/dL    Globulin 2.5 1.9 - 3.5 g/dL    A-G Ratio 1.7 g/dL   proBrain Natriuretic Peptide, NT (BNP)    Collection Time: 04/02/25  9:15 AM   Result Value Ref Range    NT-proBNP <36 0 - 125 pg/mL   Troponins NOW    Collection Time: 04/02/25  9:15 AM   Result Value Ref Range    Troponin T <6 6 - 19 ng/L   ESTIMATED GFR    Collection Time: 04/02/25  9:15 AM   Result Value Ref Range    GFR (CKD-EPI) 98 >60 mL/min/1.73 m 2   EKG    Collection Time: 04/02/25  9:38 AM    Result Value Ref Range    Report       [facility] Emergency Dept.    Test Date:  2025  Pt Name:    MERCEDEZ BRIDGES            Department: ER  MRN:        [id number]                      Room:  Gender:     Male                         Technician: [id number]  :        1975                   Requested By:ER TRIAGE PROTOCOL  Order #:    [id number]                    Reading MD: NUHA HUERTA MD    Measurements  Intervals                                Axis  Rate:       61                           P:          59  KS:         146                          QRS:        72  QRSD:       87                           T:          47  QT:         437  QTc:        441    Interpretive Statements  Sinus rhythm  Borderline T wave abnormalities  Compared to ECG 2025 08:41:22  no change  Electronically Signed On 2025 09:38:09 PDT by NUHA HUERTA MD     Troponins in two (2) hours    Collection Time: 25 11:12 AM   Result Value Ref Range    Troponin T 22 (H) 6 - 19 ng/L   EKG    Collection Time: 25 11:51 AM   Result Value Ref Range    Report       [facility] Emergency Dept.    Test Date:  2025  Pt Name:    MERCEDEZ BRIDGES            Department: ER  MRN:        [id number]                      Room:        15  Gender:     Male                         Technician: [id number]  :        1975                   Requested By:NUHA HUERTA  Order #:    [id number]                    Reading MD:    Measurements  Intervals                                Axis  Rate:       73                           P:          45  KS:         123                          QRS:        46  QRSD:       85                           T:          -24  QT:         431  QTc:        475    Interpretive Statements  Sinus rhythm  Borderline T abnormalities, inferior leads  Compared to ECG 2025 08:58:40  No significant changes         I have independently interpreted this EKG as  documented above        RADIOLOGY  I have independently interpreted the diagnostic imaging associated with this visit and am waiting the final reading from the radiologist.   My preliminary interpretation is as follows: no edema    DX-CHEST-PORTABLE (1 VIEW)   Final Result      No evidence of acute cardiopulmonary process.            COURSE & MEDICAL DECISION MAKING    INITIAL ASSESSMENT, COURSE AND PLAN  Care Narrative: 9:37 AM  Patient presents with chest pain.  Differential diagnosis is considered as below.  Of order for diagnostic labs, ECG and x-ray    12:09 PM  Patient is reevaluated and updated on all results, plan for hospitalization to which she is agreeable.  He did have 1 other episode of chest pain although this is resolved again.  Repeat ECG is unchanged.    Case is discussed with hospitalist for admission      Interventions  Medications   aspirin (Asa) chewable tab 324 mg (has no administration in time range)       Measures  CHEST PAIN:   HEART Score for Major Cardiac Events  HEART Score     History: Slightly suspicious  ECG: Non-specific repolarization disturbance  Age: 45-64  Risk Factors: >2 risk factors or hx of atherosclerotic disease  Troponin: 1-3 times normal limit    Heart Score: 5    Total Score   0-3 Points = Low Score, risk of MACE 0.9-1.7%.  4-6 Points = Moderate Score, risk of MACE 12-16.6%  7-10 Points = High Score, risk of MACE 50-65%       PROBLEM LIST    # Chest pain.  Patient with history of coronary artery disease, presenting with intermittent chest pain.  His pain would be quite atypical as he reports he is able to exert himself at work without any pain although certainly with significant risk given his history of coronary artery disease.  His delta troponin to become positive from less than 6-22.  He is given aspirin, will be admitted for further care and evaluation.  He does not have any persistent or ongoing chest pain or ST segment elevation to necessitate emergent PCI at this  point    # Hypertension.  History of    # Dyslipidemia.  History of    DISPOSITION AND DISCUSSIONS  I have discussed management of the patient with the following physicians and UYEN's:  as noted above    Is admitted in stable condition      Clinical decision guidelines/aides heart score 5    FINAL DIAGNOSIS  1. Chest pain, unspecified type        2. Elevated troponin               This dictation was created using voice recognition software. The accuracy of the dictation is limited to the abilities of the software. I expect there may be some errors of grammar and possibly content. The nursing notes were reviewed and certain aspects of this information were incorporated into this note.    Electronically signed by: Arvind Fink M.D., 4/2/2025

## 2025-04-02 NOTE — ED TRIAGE NOTES
Chief Complaint   Patient presents with    Chest Pain     Started yesterday evening.   Substernal, more pain on the left, radiates up and down chest per pt. Nothing appears to make it worse or better.        48 yo male to triage for above complaint. Ambulatory. EKG completed in triage.     Pt is alert and oriented, speaking in full sentences, follows commands and responds appropriately to questions.       BP (!) 156/87   Pulse 65   Temp 37 °C (98.6 °F) (Temporal)   Resp 18   Ht 1.829 m (6')   Wt 100 kg (221 lb 1.9 oz)   SpO2 98%   BMI 29.99 kg/m²

## 2025-04-02 NOTE — ED NOTES
Pharmacy Medication Reconciliation    ~Med rec updated and complete per patient & patient family at bedside   ~Allergies have been verified and updated  ~Is dispense history available in EPIC: yes   ~Pt home pharmacy: [facility]      ~Patient reports that he completed a 10 day course of Doxycycline  that was started on 3/21/2025      ~Anticoagulants (rivaroxaban, apixaban, edoxaban, dabigatran, warfarin, enoxaparin) taken in the last 14 days? No

## 2025-04-03 ENCOUNTER — APPOINTMENT (OUTPATIENT)
Dept: RADIOLOGY | Facility: MEDICAL CENTER | Age: 50
End: 2025-04-03
Attending: INTERNAL MEDICINE
Payer: COMMERCIAL

## 2025-04-03 VITALS
SYSTOLIC BLOOD PRESSURE: 127 MMHG | DIASTOLIC BLOOD PRESSURE: 78 MMHG | WEIGHT: 218.03 LBS | RESPIRATION RATE: 14 BRPM | BODY MASS INDEX: 29.53 KG/M2 | HEIGHT: 72 IN | HEART RATE: 51 BPM | TEMPERATURE: 98.6 F | OXYGEN SATURATION: 98 %

## 2025-04-03 LAB
ANION GAP SERPL CALC-SCNC: 8 MMOL/L (ref 7–16)
BUN SERPL-MCNC: 13 MG/DL (ref 8–22)
CALCIUM SERPL-MCNC: 8.9 MG/DL (ref 8.5–10.5)
CHLORIDE SERPL-SCNC: 107 MMOL/L (ref 96–112)
CHOLEST SERPL-MCNC: 134 MG/DL (ref 100–199)
CO2 SERPL-SCNC: 23 MMOL/L (ref 20–33)
CREAT SERPL-MCNC: 0.92 MG/DL (ref 0.5–1.4)
ERYTHROCYTE [DISTWIDTH] IN BLOOD BY AUTOMATED COUNT: 40.1 FL (ref 35.9–50)
GFR SERPLBLD CREATININE-BSD FMLA CKD-EPI: 102 ML/MIN/1.73 M 2
GLUCOSE SERPL-MCNC: 94 MG/DL (ref 65–99)
HCT VFR BLD AUTO: 40.8 % (ref 42–52)
HDLC SERPL-MCNC: 65 MG/DL
HGB BLD-MCNC: 14.4 G/DL (ref 14–18)
LDLC SERPL CALC-MCNC: 58 MG/DL
MCH RBC QN AUTO: 30.7 PG (ref 27–33)
MCHC RBC AUTO-ENTMCNC: 35.3 G/DL (ref 32.3–36.5)
MCV RBC AUTO: 87 FL (ref 81.4–97.8)
PLATELET # BLD AUTO: 222 K/UL (ref 164–446)
PMV BLD AUTO: 10.5 FL (ref 9–12.9)
POTASSIUM SERPL-SCNC: 4.6 MMOL/L (ref 3.6–5.5)
RBC # BLD AUTO: 4.69 M/UL (ref 4.7–6.1)
SODIUM SERPL-SCNC: 138 MMOL/L (ref 135–145)
TRIGL SERPL-MCNC: 56 MG/DL (ref 0–149)
WBC # BLD AUTO: 4.8 K/UL (ref 4.8–10.8)

## 2025-04-03 PROCEDURE — G0378 HOSPITAL OBSERVATION PER HR: HCPCS

## 2025-04-03 PROCEDURE — 99239 HOSP IP/OBS DSCHRG MGMT >30: CPT | Performed by: INTERNAL MEDICINE

## 2025-04-03 PROCEDURE — 80048 BASIC METABOLIC PNL TOTAL CA: CPT

## 2025-04-03 PROCEDURE — 85027 COMPLETE CBC AUTOMATED: CPT

## 2025-04-03 PROCEDURE — A9502 TC99M TETROFOSMIN: HCPCS

## 2025-04-03 PROCEDURE — 36415 COLL VENOUS BLD VENIPUNCTURE: CPT

## 2025-04-03 PROCEDURE — 80061 LIPID PANEL: CPT

## 2025-04-03 ASSESSMENT — PATIENT HEALTH QUESTIONNAIRE - PHQ9
2. FEELING DOWN, DEPRESSED, IRRITABLE, OR HOPELESS: NOT AT ALL
2. FEELING DOWN, DEPRESSED, IRRITABLE, OR HOPELESS: NOT AT ALL
SUM OF ALL RESPONSES TO PHQ9 QUESTIONS 1 AND 2: 0
1. LITTLE INTEREST OR PLEASURE IN DOING THINGS: NOT AT ALL
SUM OF ALL RESPONSES TO PHQ9 QUESTIONS 1 AND 2: 0
1. LITTLE INTEREST OR PLEASURE IN DOING THINGS: NOT AT ALL

## 2025-04-03 ASSESSMENT — PAIN DESCRIPTION - PAIN TYPE: TYPE: ACUTE PAIN

## 2025-04-03 NOTE — PROGRESS NOTES
Monitor Summary:  Rhythm: SB  Rate: 39-56  Ectopy: rare PACs    0.14/0.09/0.44    Per monitor tech interpretation

## 2025-04-03 NOTE — DISCHARGE SUMMARY
Discharge Summary    CHIEF COMPLAINT ON ADMISSION  Chief Complaint   Patient presents with    Chest Pain     Started yesterday evening.   Substernal, more pain on the left, radiates up and down chest per pt. Nothing appears to make it worse or better.        Reason for Admission  Chest Pain     Admission Date  4/2/2025    CODE STATUS  Full Code    HPI & HOSPITAL COURSE  This is a 49 y.o. male here with coronary artery disease with a serum CAC score of 217 and a 3D CT scan done in 2013 which showed 30 to 50% multivessel disease, hypertension, hyperlipidemia who presents to hospital with worsening chest pain.  He was admitted to the observation unit no issues on telemetry.  His exercise nuclear medicine cardiac stress test was negative.  His chest pain improved on its own.  His troponin went from 11-20 5-7.  I suspect this is likely musculoskeletal and is already optimized on multiple medications outlined below with good blood pressure and heart rate control.  He will need ongoing longitudinal follow-up with cardiology and outpatient primary care doctor but he is deemed stable for discharge home.    Therefore, he is discharged in good and stable condition to home with close outpatient follow-up.    Discharge Date  4/3/2025    FOLLOW UP ITEMS POST DISCHARGE  As noted above    DISCHARGE DIAGNOSES  Principal Problem:    Chest pain (POA: Yes)  Active Problems:    HTN (hypertension) (POA: Yes)    Anxiety state (POA: Yes)    Coronary artery disease involving native coronary artery of native heart without angina pectoris (POA: Yes)    PAF (paroxysmal atrial fibrillation) (HCC) (POA: Yes)  Resolved Problems:    * No resolved hospital problems. *      FOLLOW UP  No future appointments.  Rosalee Vasquez M.D.  [address]  [place] [id number] [phone]    Follow up in 1 week(s)        MEDICATIONS ON DISCHARGE     Medication List        CONTINUE taking these medications        Instructions   aspirin 81 MG EC  tablet  Commonly known as: Aspirin Low Dose   Take 1 Tablet by mouth every day.  Dose: 81 mg     atorvastatin 80 MG tablet  Commonly known as: Lipitor   Take 1 Tablet by mouth every evening.  Dose: 80 mg     doxycycline 100 MG capsule  Commonly known as: Monodox   Take 100 mg by mouth 2 times a day. 10 days  PRESCRIPTION COURSE WAS COMPLETED  Dose: 100 mg     FISH OIL PO   Take 1 Capsule by mouth every evening.  Dose: 1 Capsule     losartan 50 MG Tabs  Commonly known as: Cozaar   Take 1 Tablet by mouth every day.  Dose: 50 mg     MAGNESIUM PO   Take 1 Tablet by mouth every evening.  Dose: 1 Tablet     meloxicam 15 MG tablet  Commonly known as: Mobic   TAKE ONE TAB PO ONCE A DAY PRN FOR PAIN     POTASSIUM PO   Take 1 Tablet by mouth every evening.  Dose: 1 Tablet     VITAMIN B-12 PO   Take 1 Tablet by mouth every evening.  Dose: 1 Tablet     VITAMIN D PO   Take 1 Tablet by mouth every evening.  Dose: 1 Tablet            STOP taking these medications      methylPREDNISolone 4 MG Tbpk  Commonly known as: Medrol Dosepak              Allergies  Allergies   Allergen Reactions    Diltiazem Hcl Unspecified     Other reaction(s): Headache    Bee Venom     Lisinopril Cough     cough       Other Drug Unspecified     Can't remember.  Few yrs ago. Anti-anxiety pill       DIET  Orders Placed This Encounter   Procedures    Diet NPO Restrict to: Sips with Medications     Standing Status:   Standing     Number of Occurrences:   1     Diet NPO Restrict to::   Sips with Medications [3]       ACTIVITY  As tolerated.  Weight bearing as tolerated    CONSULTATIONS  NONE    PROCEDURES  NM-CARDIAC STRESS TEST   Final Result      DX-CHEST-PORTABLE (1 VIEW)   Final Result      No evidence of acute cardiopulmonary process.            LABORATORY  Lab Results   Component Value Date    SODIUM 138 04/03/2025    POTASSIUM 4.6 04/03/2025    CHLORIDE 107 04/03/2025    CO2 23 04/03/2025    GLUCOSE 94 04/03/2025    BUN 13 04/03/2025    CREATININE 0.92  04/03/2025        Lab Results   Component Value Date    WBC 4.8 04/03/2025    HEMOGLOBIN 14.4 04/03/2025    HEMATOCRIT 40.8 (L) 04/03/2025    PLATELETCT 222 04/03/2025        Total time of the discharge process exceeds 32 minutes.

## 2025-04-03 NOTE — ASSESSMENT & PLAN NOTE
The 10-year ASCVD risk score (Shane RUBIO, et al., 2019) is: 1.5%    Values used to calculate the score:      Age: 49 years      Sex: Male      Is Non- : No      Diabetic: No      Tobacco smoker: No      Systolic Blood Pressure: 125 mmHg      Is BP treated: Yes      HDL Cholesterol: 63 mg/dL      Total Cholesterol: 132 mg/dL      CAC score is 217, 3-D CTA in 2023 showed MVD with 30-50% stenoses  S/B [facility] in Fall 2024, somewhat atypical presentation, but reasonable to do work up  Initial trop normal, then increased, then normalized, reproducible on exam, suspect likely MSK component  Exercise treadmill in the AM with imaging  Serial trops  A1c and lipids  Already on asa and statin outpatient, continue  Monitor on tele, defer echo for now

## 2025-04-03 NOTE — CARE PLAN
The patient is Stable - Low risk of patient condition declining or worsening    Shift Goals  Clinical Goals: telemetry monitoring, stress test in the morning  Patient Goals: answers, complete stress test  Family Goals: complete stress test, answers      Problem: Knowledge Deficit - Standard  Goal: Patient and family/care givers will demonstrate understanding of plan of care, disease process/condition, diagnostic tests and medications  Description: Target End Date:  1-3 days or as soon as patient condition allows Document in Patient Education  1.  Patient and family/caregiver oriented to unit, equipment, visitation policy and means for communicating concern  2.  Complete/review Learning Assessment  3.  Assess knowledge level of disease process/condition, treatment plan, diagnostic tests and medications  4.  Explain disease process/condition, treatment plan, diagnostic tests and medications  Outcome: Progressing     Problem: Pain - Standard  Goal: Alleviation of pain or a reduction in pain to the patient’s comfort goal  Description: Target End Date:  Prior to discharge or change in level of careDocument on Vitals flowsheet  1.  Document pain using the appropriate pain scale per order or unit policy  2.  Educate and implement non-pharmacologic comfort measures (i.e. relaxation, distraction, cold/heat therapy, etc.)  3.  Pain management medications as ordered  4.  Reassess pain after pain med administration per policy    Outcome: Progressing

## 2025-04-03 NOTE — PROGRESS NOTES
Discharge orders received.  Patient arrived to the discharge lounge.  PIV removed.  Instructions given, medications reviewed and general discharge education provided to patient.  Follow up appointments discussed.  Patient verbalized understanding of dc instructions and prescriptions.  Patient signed discharge instructions.  Patient verbalized understanding had all belongings with him.  Patient left with family. Wished patient a speedy recovery.

## 2025-04-03 NOTE — PROGRESS NOTES
4 Eyes Skin Assessment Completed by Edgar, RN and JUDAH Torres.    Head WDL  Ears WDL  Nose WDL  Mouth WDL  Neck WDL  Breast/Chest WDL  Shoulder Blades WDL  Spine WDL  (R) Arm/Elbow/Hand WDL  (L) Arm/Elbow/Hand WDL  Abdomen WDL  Groin WDL  Scrotum/Coccyx/Buttocks WDL  (R) Leg WDL  (L) Leg WDL  (R) Heel/Foot/Toe WDL  (L) Heel/Foot/Toe WDL          Devices In Places Tele Box and Pulse Ox      Interventions In Place Pillows    Possible Skin Injury No    Pictures Uploaded Into Epic N/A  Wound Consult Placed N/A  RN Wound Prevention Protocol Ordered No

## 2025-04-03 NOTE — CARE PLAN
The patient is Stable - Low risk of patient condition declining or worsening    Shift Goals  Clinical Goals: Telemetry monitoring, stress test tomorrow  Patient Goals: Complete tests  Family Goals: Updates on POC    Progress made toward(s) clinical / shift goals:    Problem: Knowledge Deficit - Standard  Goal: Patient and family/care givers will demonstrate understanding of plan of care, disease process/condition, diagnostic tests and medications  Outcome: Progressing     Problem: Pain - Standard  Goal: Alleviation of pain or a reduction in pain to the patient’s comfort goal  Outcome: Progressing       Patient is not progressing towards the following goals:

## 2025-07-15 NOTE — ED NOTES
Pt given d/c paperwork and RX p/u information; pt verbalized understanding all information given. Pt ambulated out of the ER w/o difficulty    done

## 2025-08-10 DIAGNOSIS — I10 ESSENTIAL HYPERTENSION: ICD-10-CM

## 2025-08-10 DIAGNOSIS — I25.10 CORONARY ARTERY DISEASE INVOLVING NATIVE CORONARY ARTERY OF NATIVE HEART WITHOUT ANGINA PECTORIS: ICD-10-CM

## 2025-08-12 RX ORDER — ATORVASTATIN CALCIUM 80 MG/1
80 TABLET, FILM COATED ORAL EVERY EVENING
Qty: 90 TABLET | Refills: 1 | Status: SHIPPED | OUTPATIENT
Start: 2025-08-12

## 2025-08-12 RX ORDER — LOSARTAN POTASSIUM 50 MG/1
50 TABLET ORAL DAILY
Qty: 90 TABLET | Refills: 1 | Status: SHIPPED | OUTPATIENT
Start: 2025-08-12